# Patient Record
Sex: FEMALE | Race: BLACK OR AFRICAN AMERICAN | Employment: FULL TIME | URBAN - METROPOLITAN AREA
[De-identification: names, ages, dates, MRNs, and addresses within clinical notes are randomized per-mention and may not be internally consistent; named-entity substitution may affect disease eponyms.]

---

## 2020-06-01 ENCOUNTER — OFFICE VISIT (OUTPATIENT)
Dept: CARDIOLOGY CLINIC | Age: 60
End: 2020-06-01

## 2020-06-01 VITALS
WEIGHT: 185 LBS | OXYGEN SATURATION: 97 % | HEART RATE: 76 BPM | BODY MASS INDEX: 29.73 KG/M2 | HEIGHT: 66 IN | SYSTOLIC BLOOD PRESSURE: 170 MMHG | DIASTOLIC BLOOD PRESSURE: 112 MMHG

## 2020-06-01 DIAGNOSIS — R94.31 ABNORMAL EKG: ICD-10-CM

## 2020-06-01 DIAGNOSIS — R06.02 SOB (SHORTNESS OF BREATH): ICD-10-CM

## 2020-06-01 DIAGNOSIS — I50.9 CONGESTIVE HEART FAILURE, UNSPECIFIED HF CHRONICITY, UNSPECIFIED HEART FAILURE TYPE (HCC): Primary | ICD-10-CM

## 2020-06-01 DIAGNOSIS — I10 ESSENTIAL HYPERTENSION: ICD-10-CM

## 2020-06-01 DIAGNOSIS — E78.5 DYSLIPIDEMIA: ICD-10-CM

## 2020-06-01 RX ORDER — AMLODIPINE BESYLATE 10 MG/1
10 TABLET ORAL DAILY
Qty: 30 TAB | Refills: 5 | Status: SHIPPED | OUTPATIENT
Start: 2020-06-01 | End: 2021-08-12

## 2020-06-01 RX ORDER — CARVEDILOL 12.5 MG/1
12.5 TABLET ORAL 2 TIMES DAILY WITH MEALS
COMMUNITY
End: 2020-08-24

## 2020-06-01 RX ORDER — ASCORBIC ACID 500 MG
500 TABLET ORAL
COMMUNITY

## 2020-06-01 RX ORDER — ASPIRIN 325 MG
325 TABLET ORAL DAILY
COMMUNITY
End: 2022-01-25

## 2020-06-01 RX ORDER — MONTELUKAST SODIUM 10 MG/1
10 TABLET ORAL DAILY
COMMUNITY
End: 2021-08-13 | Stop reason: SDUPTHER

## 2020-06-01 RX ORDER — LANOLIN ALCOHOL/MO/W.PET/CERES
325 CREAM (GRAM) TOPICAL
COMMUNITY
End: 2020-11-30

## 2020-06-01 RX ORDER — LISINOPRIL 20 MG/1
20 TABLET ORAL DAILY
COMMUNITY
End: 2020-09-03

## 2020-06-01 RX ORDER — POTASSIUM CHLORIDE 20 MEQ/1
20 TABLET, EXTENDED RELEASE ORAL DAILY
COMMUNITY
End: 2022-01-19 | Stop reason: SDUPTHER

## 2020-06-01 RX ORDER — ERGOCALCIFEROL 1.25 MG/1
50000 CAPSULE ORAL
Status: ON HOLD | COMMUNITY
End: 2021-10-06

## 2020-06-01 RX ORDER — LANOLIN ALCOHOL/MO/W.PET/CERES
1000 CREAM (GRAM) TOPICAL
COMMUNITY

## 2020-06-01 RX ORDER — TRIAMCINOLONE ACETONIDE 55 UG/1
2 SPRAY, METERED NASAL DAILY
COMMUNITY
End: 2022-01-25

## 2020-06-01 RX ORDER — FUROSEMIDE 40 MG/1
40 TABLET ORAL DAILY
COMMUNITY
End: 2022-01-19 | Stop reason: SDUPTHER

## 2020-06-01 NOTE — PROGRESS NOTES
Spero Phlegm presents today for   Chief Complaint   Patient presents with    New Patient     referred by PCP for CHF    Shortness of Breath     exertion, 3 weeks       Spero Phlegm preferred language for health care discussion is english/other. Is someone accompanying this pt? no    Is the patient using any DME equipment during [address]? no    Depression Screening:  3 most recent PHQ Screens 6/1/2020   Little interest or pleasure in doing things Not at all   Feeling down, depressed, irritable, or hopeless Not at all   Total Score PHQ 2 0       Learning Assessment:  Learning Assessment 6/1/2020   PRIMARY LEARNER Patient   PRIMARY LANGUAGE ENGLISH   LEARNER PREFERENCE PRIMARY DEMONSTRATION   ANSWERED BY Patient   RELATIONSHIP SELF       Abuse Screening:  Abuse Screening Questionnaire 6/1/2020   Do you ever feel afraid of your partner? N   Are you in a relationship with someone who physically or mentally threatens you? N   Is it safe for you to go home? Y       Fall Risk  Fall Risk Assessment, last 12 mths 6/1/2020   Able to walk? Yes   Fall in past 12 months? No       Pt currently taking Anticoagulant therapy? ASA 325mg every day     Coordination of Care:  1. Have you been to the ER, urgent care clinic since your last visit? Hospitalized since your last visit? no    2. Have you seen or consulted any other health care providers outside of the [address] [place] [address] since your last visit? Include any pap smears or colon screening.  no

## 2020-06-01 NOTE — PROGRESS NOTES
HISTORY OF PRESENT ILLNESS  Joselin Beard is a 60 y.o. female. HPI    Patient presents for a new office visit. She was referred here by her PCP for evaluation of congestive heart failure. Patient has a longstanding history of essential hypertension for greater than 10 years. Patient states she also has a history of congestive heart failure which required a cardiac work-up by Dr. Araseli Bobby at [facility] approximately 10 years ago which included an echocardiogram and what sounds like a cardiac catheterization. Patient was told that her heart function was normal but she may have had some valvular heart disease. She had no significant blockages on her heart catheterization. Patient reports that she had to switch  her PCPs due to insurance reasons and as result was off all of her medications for almost 2 months. She states that approximately 3 to 4 weeks ago she developed worsening shortness of breath which was more noticeable with exertion when she was laying down at night. She was also having some wheezing. She establish care with a new PCP, who diagnosed her with acute congestive heart failure based on an elevated BNP level and abnormal chest x-ray. She was started back on some blood pressure medications along with furosemide which she has been taking now for 2 weeks. She states since starting these medications, she is been feeling much better. He denies any leg swelling. No significant weight gain over the past few months. She denies any exertional chest pain. No claudication, no heart palpitations, dizzy spells or syncope. Past Medical History:   Diagnosis Date    Congestive heart failure (HCC)     Essential hypertension     Goiter      Current Outpatient Medications   Medication Sig Dispense Refill    aspirin (ASPIRIN) 325 mg tablet Take 325 mg by mouth daily.  ascorbic acid, vitamin C, (Vitamin C) 500 mg tablet Take  by mouth.       furosemide (Lasix) 40 mg tablet Take 40 mg by mouth daily.  montelukast (Singulair) 10 mg tablet Take 10 mg by mouth daily.  ergocalciferol (Vitamin D2) 1,250 mcg (50,000 unit) capsule Take 50,000 Units by mouth.  cyanocobalamin 1,000 mcg tablet Take 1,000 mcg by mouth daily.  ferrous sulfate (Iron) 325 mg (65 mg iron) tablet Take 325 mg by mouth Daily (before breakfast).  potassium chloride (K-DUR, KLOR-CON) 20 mEq tablet Take 20 mEq by mouth daily.  lisinopriL (PRINIVIL, ZESTRIL) 20 mg tablet Take 20 mg by mouth daily.  carvediloL (Coreg) 12.5 mg tablet Take 12.5 mg by mouth two (2) times daily (with meals).  triamcinolone (NASACORT AQ) 55 mcg nasal inhaler 2 Sprays by Both Nostrils route daily.  amLODIPine (NORVASC) 10 mg tablet Take 1 Tab by mouth daily. 30 Tab 5     Allergies   Allergen Reactions    Nitroglycerin Other (comments)     Headaches      Social History     Tobacco Use    Smoking status: Never Smoker    Smokeless tobacco: Never Used   Substance Use Topics    Alcohol use: Not Currently    Drug use: Not on file     Family History   Problem Relation Age of Onset    Cancer Mother         breast    Hypertension Mother     Heart Surgery Mother          Review of Systems   Constitutional: Negative for chills, fever and weight loss. HENT: Negative for nosebleeds. Eyes: Negative for blurred vision and double vision. Respiratory: Positive for shortness of breath. Negative for cough and wheezing. Cardiovascular: Positive for orthopnea. Negative for chest pain, palpitations, claudication, leg swelling and PND. Gastrointestinal: Negative for abdominal pain, heartburn, nausea and vomiting. Genitourinary: Negative for dysuria and hematuria. Musculoskeletal: Negative for falls and myalgias. Skin: Negative for rash. Neurological: Negative for dizziness, focal weakness and headaches. Endo/Heme/Allergies: Does not bruise/bleed easily.    Psychiatric/Behavioral: Negative for substance abuse.     Visit Vitals  BP (!) 170/112 (BP 1 Location: Left arm, BP Patient Position: Sitting)   Pulse 76   Ht 5' 6\" (1.676 m)   Wt 83.9 kg (185 lb)   SpO2 97%   BMI 29.86 kg/m²       Physical Exam   Constitutional: She is oriented to person, place, and time. She appears well-developed and well-nourished. HENT:   Head: Normocephalic and atraumatic. Eyes: Conjunctivae are normal.   Neck: Neck supple. No JVD present. Carotid bruit is not present. Cardiovascular: Normal rate, regular rhythm, S1 normal, S2 normal and normal pulses. Exam reveals no gallop. No murmur heard. Pulmonary/Chest: Effort normal and breath sounds normal. She has no wheezes. She has no rales. Abdominal: Soft. Bowel sounds are normal. There is no abdominal tenderness. Musculoskeletal:         General: No tenderness, deformity or edema. Neurological: She is alert and oriented to person, place, and time. Skin: Skin is warm and dry. Psychiatric: She has a normal mood and affect. Her behavior is normal. Thought content normal.     EKG: Normal sinus rhythm, probable left atrial enlargement, normal axis, normal QTc interval, poor R wave progression, diffuse nonspecific T wave abnormalities likely due to repolarization abnormality. Compared to the previous EKG, PVCs are no longer present, otherwise unchanged. ASSESSMENT and PLAN    Acute decompensated heart failure. Presumed heart failure with preserved ejection fraction based on prior history, and I suspect this may be due to poorly controlled blood pressure. This has significantly improved over the past 2 weeks since starting back on blood pressure medications along with her furosemide. She had not been taking medicines for at least 2 months. I have recommended an echocardiogram.  Of also advised a less than 2 g sodium diet and CHF education was given. Lastly, would like to add amlodipine to her regimen for better blood pressure control.   She states she is scheduled follow-up labs with her PCP this week. Essential hypertension. Patient reports that she recently restarted her medications after not having taken any blood pressure meds for over 2 months. She did not take her medications today, but I suspect she will need additional blood pressure lowering. She states that at home her blood pressure readings are still averaging 150/100. I recommended adding amlodipine 10 mg to her regimen and continuing to take her current dosage of carvedilol, lisinopril, and furosemide. It should be noted that she was also taking chlorthalidone as well. At this point I would not restart this medication since she is taking furosemide. Dyslipidemia. Patient was previously taking a statin, though that was recently stopped by PCP due to elevated LFTs. Abnormal EKG. Patient's EKG changes are likely due to left ventricular approach with repolarization. An echocardiogram will be ordered as described above.     Follow-up in 2 months, sooner if needed

## 2020-06-01 NOTE — PATIENT INSTRUCTIONS
START LOW SODIUM DIET  START AMLODIPINE 10MG ONCE DAILY  ECHO FOR CHF   FOLLOW UP 2 MONTHS    If you have not heard from the central scheduler to schedule your testing in 48 hours, please call [phone]. Low Sodium Diet (2,000 Milligram): Care Instructions  Your Care Instructions     Too much sodium causes your body to hold on to extra water. This can raise your blood pressure and force your heart and kidneys to work harder. In very serious cases, this could cause you to be put in the hospital. It might even be life-threatening. By limiting sodium, you will feel better and lower your risk of serious problems. The most common source of sodium is salt. People get most of the salt in their diet from canned, prepared, and packaged foods. Fast food and restaurant meals also are very high in sodium. Your doctor will probably limit your sodium to less than 2,000 milligrams (mg) a day. This limit counts all the sodium in prepared and packaged foods and any salt you add to your food. Follow-up care is a key part of your treatment and safety. Be sure to make and go to all appointments, and call your doctor if you are having problems. It's also a good idea to know your test results and keep a list of the medicines you take. How can you care for yourself at home? Read food labels  · Read labels on cans and food packages. The labels tell you how much sodium is in each serving. Make sure that you look at the serving size. If you eat more than the serving size, you have eaten more sodium. · Food labels also tell you the Percent Daily Value for sodium. Choose products with low Percent Daily Values for sodium. · Be aware that sodium can come in forms other than salt, including monosodium glutamate (MSG), sodium citrate, and sodium bicarbonate (baking soda). MSG is often added to Asian food. When you eat out, you can sometimes ask for food without MSG or added salt.   Buy low-sodium foods  · Buy foods that are labeled \"unsalted\" (no salt added), \"sodium-free\" (less than 5 mg of sodium per serving), or \"low-sodium\" (less than 140 mg of sodium per serving). Foods labeled \"reduced-sodium\" and \"light sodium\" may still have too much sodium. Be sure to read the label to see how much sodium you are getting. · Buy fresh vegetables, or frozen vegetables without added sauces. Buy low-sodium versions of canned vegetables, soups, and other canned goods. Prepare low-sodium meals  · Cut back on the amount of salt you use in cooking. This will help you adjust to the taste. Do not add salt after cooking. One teaspoon of salt has about 2,300 mg of sodium. · Take the salt shaker off the table. · Flavor your food with garlic, lemon juice, onion, vinegar, herbs, and spices. Do not use soy sauce, lite soy sauce, steak sauce, onion salt, garlic salt, celery salt, mustard, or ketchup on your food. · Use low-sodium salad dressings, sauces, and ketchup. Or make your own salad dressings and sauces without adding salt. · Use less salt (or none) when recipes call for it. You can often use half the salt a recipe calls for without losing flavor. Other foods such as rice, pasta, and grains do not need added salt. · Rinse canned vegetables, and cook them in fresh water. This removes some--but not all--of the salt. · Avoid water that is naturally high in sodium or that has been treated with water softeners, which add sodium. Call your local water company to find out the sodium content of your water supply. If you buy bottled water, read the label and choose a sodium-free brand. Avoid high-sodium foods  · Avoid eating:  ? Smoked, cured, salted, and canned meat, fish, and poultry. ? Ham, keys, hot dogs, and luncheon meats. ? Regular, hard, and processed cheese and regular peanut butter. ? Crackers with salted tops, and other salted snack foods such as pretzels, chips, and salted popcorn. ? Frozen prepared meals, unless labeled low-sodium. ?  Canned and dried soups, broths, and bouillon, unless labeled sodium-free or low-sodium. ? Canned vegetables, unless labeled sodium-free or low-sodium. ? Western Jennifer fries, pizza, tacos, and other fast foods. ? Pickles, olives, ketchup, and other condiments, especially soy sauce, unless labeled sodium-free or low-sodium. Where can you learn more? Go to http://rd-douglas.info/  Enter V843 in the search box to learn more about \"Low Sodium Diet (2,000 Milligram): Care Instructions. \"  Current as of: August 22, 2019               Content Version: 12.5  © 2071-6191 [employer]. Care instructions adapted under license by [employer] (which disclaims liability or warranty for this information). If you have questions about a medical condition or this instruction, always ask your healthcare professional. [address] any warranty or liability for your use of this information.

## 2020-07-09 ENCOUNTER — HOSPITAL ENCOUNTER (OUTPATIENT)
Dept: NON INVASIVE DIAGNOSTICS | Age: 60
Discharge: HOME | End: 2020-07-09
Attending: INTERNAL MEDICINE
Payer: COMMERCIAL

## 2020-07-09 VITALS
WEIGHT: 185 LBS | DIASTOLIC BLOOD PRESSURE: 112 MMHG | BODY MASS INDEX: 29.73 KG/M2 | SYSTOLIC BLOOD PRESSURE: 170 MMHG | HEIGHT: 66 IN

## 2020-07-09 DIAGNOSIS — I50.9 CONGESTIVE HEART FAILURE, UNSPECIFIED HF CHRONICITY, UNSPECIFIED HEART FAILURE TYPE (HCC): ICD-10-CM

## 2020-07-09 PROCEDURE — 93306 TTE W/DOPPLER COMPLETE: CPT

## 2020-07-10 LAB
ECHO AO ROOT DIAM: 3.36 CM
ECHO LA AREA 4C: 29.22 CM2
ECHO LA VOL 2C: 91.54 ML (ref 22–52)
ECHO LA VOL 4C: 98.04 ML (ref 22–52)
ECHO LA VOL BP: 112.09 ML (ref 22–52)
ECHO LA VOL/BSA BIPLANE: 57.93 ML/M2 (ref 16–28)
ECHO LA VOLUME INDEX A2C: 47.31 ML/M2 (ref 16–28)
ECHO LA VOLUME INDEX A4C: 50.67 ML/M2 (ref 16–28)
ECHO LV E' LATERAL VELOCITY: 5.4 CM/S
ECHO LV E' SEPTAL VELOCITY: 4.01 CM/S
ECHO LV EDV A2C: 110.15 ML
ECHO LV EDV A4C: 142.25 ML
ECHO LV EDV BP: 126.34 ML (ref 56–104)
ECHO LV EDV INDEX A4C: 73.5 ML/M2
ECHO LV EDV INDEX BP: 65.3 ML/M2
ECHO LV EDV NDEX A2C: 56.9 ML/M2
ECHO LV EJECTION FRACTION A2C: 43 PERCENT
ECHO LV EJECTION FRACTION A4C: 33 PERCENT
ECHO LV EJECTION FRACTION BIPLANE: 38.9 PERCENT (ref 55–100)
ECHO LV ESV A2C: 62.75 ML
ECHO LV ESV A4C: 94.88 ML
ECHO LV ESV BP: 77.26 ML (ref 19–49)
ECHO LV ESV INDEX A2C: 32.4 ML/M2
ECHO LV ESV INDEX A4C: 49 ML/M2
ECHO LV ESV INDEX BP: 39.9 ML/M2
ECHO LV INTERNAL DIMENSION DIASTOLIC: 5.63 CM (ref 3.9–5.3)
ECHO LV INTERNAL DIMENSION SYSTOLIC: 5.33 CM
ECHO LV IVSD: 1.12 CM (ref 0.6–0.9)
ECHO LV MASS 2D: 267 G (ref 67–162)
ECHO LV MASS INDEX 2D: 138 G/M2 (ref 43–95)
ECHO LV POSTERIOR WALL DIASTOLIC: 1.18 CM (ref 0.6–0.9)
ECHO LVOT CARDIAC OUTPUT: 5.03 LITER/MINUTE
ECHO LVOT DIAM: 2.16 CM
ECHO LVOT PEAK GRADIENT: 3.95 MMHG
ECHO LVOT PEAK VELOCITY: 99.34 CM/S
ECHO LVOT SV: 69.4 ML
ECHO LVOT VTI: 18.95 CM
ECHO MV A VELOCITY: 71.63 CM/S
ECHO MV E DECELERATION TIME (DT): 0.17 S
ECHO MV E VELOCITY: 87.92 CM/S
ECHO MV E/A RATIO: 1.23
ECHO MV E/E' LATERAL: 16.28
ECHO MV E/E' RATIO (AVERAGED): 19.1
ECHO MV E/E' SEPTAL: 21.93
ECHO MV EROA PISA: 0.05 CM2
ECHO MV REGURGITANT RADIUS PISA: 0.4 CM
ECHO MV REGURGITANT VOLUME: 9.69 ML
ECHO MV REGURGITANT VTIA: 190.66 CM
ECHO PV REGURGITANT MAX VELOCITY: 560.7 CM/S
ECHO RV TAPSE: 2.47 CM (ref 1.5–2)
ECHO TV REGURGITANT MAX VELOCITY: 273.66 CM/S
ECHO TV REGURGITANT PEAK GRADIENT: 29.96 MMHG
LVOT MG: 2.07 MMHG

## 2020-07-13 NOTE — PROGRESS NOTES
Per your last note \"      Acute decompensated heart failure. Presumed heart failure with preserved ejection fraction based on prior history, and I suspect this may be due to poorly controlled blood pressure. This has significantly improved over the past 2 weeks since starting back on blood pressure medications along with her furosemide. She had not been taking medicines for at least 2 months. I have recommended an echocardiogram.  Of also advised a less than 2 g sodium diet and CHF education was given. Lastly, would like to add amlodipine to her regimen for better blood pressure control. She states she is scheduled follow-up labs with her PCP this week.

## 2020-08-24 ENCOUNTER — OFFICE VISIT (OUTPATIENT)
Dept: CARDIOLOGY CLINIC | Age: 60
End: 2020-08-24

## 2020-08-24 VITALS
OXYGEN SATURATION: 97 % | BODY MASS INDEX: 30.22 KG/M2 | HEART RATE: 77 BPM | DIASTOLIC BLOOD PRESSURE: 88 MMHG | WEIGHT: 188 LBS | HEIGHT: 66 IN | SYSTOLIC BLOOD PRESSURE: 140 MMHG

## 2020-08-24 DIAGNOSIS — I50.9 CONGESTIVE HEART FAILURE, UNSPECIFIED HF CHRONICITY, UNSPECIFIED HEART FAILURE TYPE (HCC): Primary | ICD-10-CM

## 2020-08-24 DIAGNOSIS — E78.5 DYSLIPIDEMIA: ICD-10-CM

## 2020-08-24 DIAGNOSIS — I10 ESSENTIAL HYPERTENSION: ICD-10-CM

## 2020-08-24 DIAGNOSIS — I42.9 CARDIOMYOPATHY, UNSPECIFIED TYPE (HCC): ICD-10-CM

## 2020-08-24 RX ORDER — CARVEDILOL 25 MG/1
25 TABLET ORAL 2 TIMES DAILY WITH MEALS
Qty: 60 TAB | Refills: 5 | Status: SHIPPED | OUTPATIENT
Start: 2020-08-24 | End: 2022-01-19 | Stop reason: SDUPTHER

## 2020-08-24 NOTE — PATIENT INSTRUCTIONS
Increase coreg to 25mg twice daily  Limited echo for chf  Follow up 3 months     If you have not heard from the central scheduler to schedule your testing in 48 hours, please call [phone].

## 2020-08-24 NOTE — PROGRESS NOTES
HISTORY OF PRESENT ILLNESS  Wendi Garcia is a 60 y.o. female. Follow-up   Pertinent negatives include no chest pain, no abdominal pain, no headaches and no shortness of breath. Patient presents for a follow-up office visit. She was initially referred here by her PCP for evaluation of congestive heart failure. Patient has a longstanding history of essential hypertension for greater than 10 years. Patient states she also has a history of congestive heart failure which required a cardiac work-up by Dr. Gagandeep Santana at [facility] approximately 10 years ago which included an echocardiogram and what sounds like a cardiac catheterization. Patient was told that her heart function was normal but she may have had some valvular heart disease. She had no significant blockages on her heart catheterization. She recently establish care with a new PCP, who diagnosed her with acute congestive heart failure based on an elevated BNP level and abnormal chest x-ray. She was started back on some blood pressure medications along with furosemide. Patient was last seen in our office a little less than 3 months ago. She underwent an echocardiogram in July 2020 which showed a mildly depressed LV function, EF 35 to 40% with global hypokinesis, grade 2 diastolic dysfunction, moderate mitral regurgitation, right atrial and right ventricular enlargement, severe left atrial argument with upper normal PA pressures. Since last visit, she states she has been taking all of her medications as prescribed including her furosemide is been feeling much better. She states her shortness of breath has significantly improved. She denies any orthopnea, PND or leg swelling. No exertional chest pain or pressure.     Past Medical History:   Diagnosis Date    Congestive heart failure (HCC)     Essential hypertension     Goiter      Current Outpatient Medications   Medication Sig Dispense Refill    carvediloL (COREG) 25 mg tablet Take 1 Tab by mouth two (2) times daily (with meals). 60 Tab 5    aspirin (ASPIRIN) 325 mg tablet Take 325 mg by mouth daily.  ascorbic acid, vitamin C, (Vitamin C) 500 mg tablet Take  by mouth.  furosemide (Lasix) 40 mg tablet Take 40 mg by mouth daily.  montelukast (Singulair) 10 mg tablet Take 10 mg by mouth daily.  ergocalciferol (Vitamin D2) 1,250 mcg (50,000 unit) capsule Take 50,000 Units by mouth.  cyanocobalamin 1,000 mcg tablet Take 1,000 mcg by mouth daily.  ferrous sulfate (Iron) 325 mg (65 mg iron) tablet Take 325 mg by mouth Daily (before breakfast).  potassium chloride (K-DUR, KLOR-CON) 20 mEq tablet Take 20 mEq by mouth daily.  lisinopriL (PRINIVIL, ZESTRIL) 20 mg tablet Take 20 mg by mouth daily.  triamcinolone (NASACORT AQ) 55 mcg nasal inhaler 2 Sprays by Both Nostrils route daily.  amLODIPine (NORVASC) 10 mg tablet Take 1 Tab by mouth daily. 30 Tab 5     Allergies   Allergen Reactions    Nitroglycerin Other (comments)     Headaches      Social History     Tobacco Use    Smoking status: Never Smoker    Smokeless tobacco: Never Used   Substance Use Topics    Alcohol use: Not Currently    Drug use: Not on file     Family History   Problem Relation Age of Onset    Cancer Mother         breast    Hypertension Mother     Heart Surgery Mother          Review of Systems   Constitutional: Negative for chills, fever and weight loss. HENT: Negative for nosebleeds. Eyes: Negative for blurred vision and double vision. Respiratory: Negative for cough, shortness of breath and wheezing. Cardiovascular: Negative for chest pain, palpitations, orthopnea, claudication, leg swelling and PND. Gastrointestinal: Negative for abdominal pain, heartburn, nausea and vomiting. Genitourinary: Negative for dysuria and hematuria. Musculoskeletal: Negative for falls and myalgias. Skin: Negative for rash.    Neurological: Negative for dizziness, focal weakness and headaches. Endo/Heme/Allergies: Does not bruise/bleed easily. Psychiatric/Behavioral: Negative for substance abuse. Visit Vitals  /88 (BP 1 Location: Right arm, BP Patient Position: Sitting)   Pulse 77   Ht 5' 6\" (1.676 m)   Wt 85.3 kg (188 lb)   SpO2 97%   BMI 30.34 kg/m²       Physical Exam   Constitutional: She is oriented to person, place, and time. She appears well-developed and well-nourished. HENT:   Head: Normocephalic and atraumatic. Eyes: Conjunctivae are normal.   Neck: Neck supple. No JVD present. Carotid bruit is not present. Cardiovascular: Normal rate, regular rhythm, S1 normal, S2 normal and normal pulses. Exam reveals no gallop. No murmur heard. Pulmonary/Chest: Effort normal and breath sounds normal. She has no wheezes. She has no rales. Abdominal: Soft. Bowel sounds are normal. There is no abdominal tenderness. Musculoskeletal:         General: No tenderness, deformity or edema. Neurological: She is alert and oriented to person, place, and time. Skin: Skin is warm and dry. Psychiatric: She has a normal mood and affect. Her behavior is normal. Thought content normal.     ASSESSMENT and PLAN    Chronic systolic heart failure. Echocardiogram in July 2020 showed a mildly depressed LV function, EF 35-40 % with global hypokinesis and grade 2 diastolic dysfunction. At last visit she was decompensated, she now appears to be well compensated on her current medical regimen along with her furosemide. I have recommended increasing her carvedilol to 25 mg twice daily. She should also adhere to a low-sodium diet. I would like to repeat a limited echocardiogram in several months to reevaluate her LV function. Cardiomyopathy. Presumably hypertensive in nature. Hopefully her ejection fraction will improve with optimal medical therapy. A repeat echocardiogram was ordered as described above. Essential hypertension.   This is now much better controlled than it had been noted she is taking her medications regularly. I would like to increase her carvedilol to 25 mg twice daily. Dyslipidemia. Patient was previously taking a statin, though that was recently stopped by PCP due to elevated LFTs.     Follow-up in 3 months, sooner if needed

## 2020-08-24 NOTE — PROGRESS NOTES
Claribel Weiss presents today for   Chief Complaint   Patient presents with    Follow-up     2 month follow up       Claribel Weiss preferred language for health care discussion is english/other. Is someone accompanying this pt? no    Is the patient using any DME equipment during [address]? no    Depression Screening:  3 most recent PHQ Screens 8/24/2020   Little interest or pleasure in doing things Not at all   Feeling down, depressed, irritable, or hopeless Not at all   Total Score PHQ 2 0       Learning Assessment:  Learning Assessment 6/1/2020   PRIMARY LEARNER Patient   PRIMARY LANGUAGE ENGLISH   LEARNER PREFERENCE PRIMARY DEMONSTRATION   ANSWERED BY Patient   RELATIONSHIP SELF       Abuse Screening:  Abuse Screening Questionnaire 8/24/2020   Do you ever feel afraid of your partner? N   Are you in a relationship with someone who physically or mentally threatens you? N   Is it safe for you to go home? Y       Fall Risk  Fall Risk Assessment, last 12 mths 8/24/2020   Able to walk? Yes   Fall in past 12 months? No       Pt currently taking Anticoagulant therapy? No but is taking  mg once a day    Coordination of Care:  1. Have you been to the ER, urgent care clinic since your last visit? Hospitalized since your last visit? no    2. Have you seen or consulted any other health care providers outside of the [address] [place] [address] since your last visit? Include any pap smears or colon screening.  no

## 2020-09-01 ENCOUNTER — VIRTUAL VISIT (OUTPATIENT)
Dept: FAMILY MEDICINE CLINIC | Age: 60
End: 2020-09-01
Payer: COMMERCIAL

## 2020-09-01 DIAGNOSIS — R52 PAIN: ICD-10-CM

## 2020-09-01 DIAGNOSIS — R11.0 NAUSEA: ICD-10-CM

## 2020-09-01 DIAGNOSIS — N20.0 RENAL STONE: Primary | ICD-10-CM

## 2020-09-01 DIAGNOSIS — N20.0 STAGHORN CALCULUS: ICD-10-CM

## 2020-09-01 PROCEDURE — 99214 OFFICE O/P EST MOD 30 MIN: CPT | Performed by: NURSE PRACTITIONER

## 2020-09-01 RX ORDER — KETOROLAC TROMETHAMINE 10 MG/1
10 TABLET, FILM COATED ORAL
Qty: 20 TAB | Refills: 0 | Status: SHIPPED | OUTPATIENT
Start: 2020-09-01 | End: 2021-10-08

## 2020-09-01 RX ORDER — ONDANSETRON 4 MG/1
4 TABLET, ORALLY DISINTEGRATING ORAL
COMMUNITY
End: 2020-09-01

## 2020-09-01 RX ORDER — OXYCODONE AND ACETAMINOPHEN 5; 325 MG/1; MG/1
1 TABLET ORAL
COMMUNITY
End: 2020-09-01

## 2020-09-01 RX ORDER — ONDANSETRON 8 MG/1
8 TABLET, ORALLY DISINTEGRATING ORAL
Qty: 20 TAB | Refills: 0 | Status: SHIPPED | OUTPATIENT
Start: 2020-09-01 | End: 2022-07-28 | Stop reason: ALTCHOICE

## 2020-09-01 NOTE — PROGRESS NOTES
Wil Alvarez presents today for   Chief Complaint   Patient presents with   [facility] Follow Up     ER follow up. Still having pain in the groin area. Depression Screening:  3 most recent PHQ Screens 9/1/2020   Little interest or pleasure in doing things Not at all   Feeling down, depressed, irritable, or hopeless Not at all   Total Score PHQ 2 0       Learning Assessment:  Learning Assessment 9/1/2020   PRIMARY LEARNER Patient   HIGHEST LEVEL OF EDUCATION - PRIMARY LEARNER  SOME COLLEGE   BARRIERS PRIMARY LEARNER NONE   CO-LEARNER CAREGIVER No   PRIMARY LANGUAGE ENGLISH   LEARNER PREFERENCE PRIMARY LISTENING   ANSWERED BY patient   RELATIONSHIP SELF       Abuse Screening:  Abuse Screening Questionnaire 8/24/2020   Do you ever feel afraid of your partner? N   Are you in a relationship with someone who physically or mentally threatens you? N   Is it safe for you to go home? Y       Fall Risk  Fall Risk Assessment, last 12 mths 8/24/2020   Able to walk? Yes   Fall in past 12 months? No       Health Maintenance reviewed and discussed and ordered per Provider. Health Maintenance Due   Topic Date Due    Hepatitis C Screening  1960    DTaP/Tdap/Td series (1 - Tdap) 10/10/1980    PAP AKA CERVICAL CYTOLOGY  10/10/1980    Lipid Screen  10/10/1999    Shingrix Vaccine Age 50> (1 of 2) 10/10/2009    Breast Cancer Screen Mammogram  10/10/2009    FOBT Q1Y Age 50-75  10/10/2009    Flu Vaccine (1) 09/01/2020   . Coordination of Care:  1. Have you been to the ER, urgent care clinic since your last visit? Hospitalized since your last visit? Yes, for abdominal pain. Patient is having ER follow up apt today. 2. Have you seen or consulted any other health care providers outside of the [address] [place] [address] since your last visit? Include any pap smears or colon screening.  No

## 2020-09-01 NOTE — PATIENT INSTRUCTIONS
Kidney Stone: Care Instructions Your Care Instructions Kidney stones are formed when salts, minerals, and other substances normally found in the urine clump together. They can be as small as grains of sand or, rarely, as large as golf balls. While the stone is traveling through the ureter, which is the tube that carries urine from the kidney to the bladder, you will probably feel pain. The pain may be mild or very severe. You may also have some blood in your urine. As soon as the stone reaches the bladder, any intense pain should go away. If a stone is too large to pass on its own, you may need a medical procedure to help you pass the stone. The doctor has checked you carefully, but problems can develop later. If you notice any problems or new symptoms, get medical treatment right away. Follow-up care is a key part of your treatment and safety. Be sure to make and go to all appointments, and call your doctor if you are having problems. It's also a good idea to know your test results and keep a list of the medicines you take. How can you care for yourself at home? · Drink plenty of fluids, enough so that your urine is light yellow or clear like water. If you have kidney, heart, or liver disease and have to limit fluids, talk with your doctor before you increase the amount of fluids you drink. · Take pain medicines exactly as directed. Call your doctor if you think you are having a problem with your medicine. ? If the doctor gave you a prescription medicine for pain, take it as prescribed. ? If you are not taking a prescription pain medicine, ask your doctor if you can take an over-the-counter medicine. Read and follow all instructions on the label. · Your doctor may ask you to strain your urine so that you can collect your kidney stone when it passes. You can use a kitchen strainer or a tea strainer to catch the stone. Store it in a plastic bag until you see your doctor again. Preventing future kidney stones Some changes in your diet may help prevent kidney stones. Depending on the cause of your stones, your doctor may recommend that you: · Drink plenty of fluids, enough so that your urine is light yellow or clear like water. If you have kidney, heart, or liver disease and have to limit fluids, talk with your doctor before you increase the amount of fluids you drink. · Limit coffee, tea, and alcohol. Also avoid grapefruit juice. · Do not take more than the recommended daily dose of vitamins C and D. 
· Avoid antacids such as Gaviscon, Maalox, Mylanta, or Tums. · Limit the amount of salt (sodium) in your diet. · Eat a balanced diet that is not too high in protein. · Limit foods that are high in a substance called oxalate, which can cause kidney stones. These foods include dark green vegetables, rhubarb, chocolate, wheat bran, nuts, cranberries, and beans. When should you call for help? Call your doctor now or seek immediate medical care if: 
· You cannot keep down fluids. · Your pain gets worse. · You have a fever or chills. · You have new or worse pain in your back just below your rib cage (the flank area). · You have new or more blood in your urine. Watch closely for changes in your health, and be sure to contact your doctor if: 
· You do not get better as expected. Where can you learn more? Go to http://rd-douglas.info/ Enter Z897 in the search box to learn more about \"Kidney Stone: Care Instructions. \" Current as of: August 12, 2019               Content Version: 12.5 © 0020-7390 Healthwise, Incorporated. Care instructions adapted under license by [employer] (which disclaims liability or warranty for this information). If you have questions about a medical condition or this instruction, always ask your healthcare professional. [address] any warranty or liability for your use of this information. Learning About Diet for Kidney Stone Prevention What are kidney stones? Kidney stones are made of salts and minerals in the urine that form small \"anali. \" Stones can form in the kidneys and the ureters (the tubes that lead from the kidneys to the bladder). They can also form in the bladder. Stones may not cause a problem as long as they stay in the kidneys. But they can cause sudden, severe pain. Pain is most likely when the stones travel from the kidneys to the bladder. Kidney stones can cause bloody urine. Kidney stones often run in families. You are more likely to get them if you don't drink enough fluids, mainly water. Certain foods and drinks and some dietary supplements may also increase your risk for kidney stones if you consume too much of them. What can you do to prevent kidney stones? Changing what you eat may not prevent all types of kidney stones. But for people who have a history of certain kinds of kidney stones, some changes in diet may help. A dietitian can help you set up a meal plan that includes healthy, low-oxalate choices. Here are some general guidelines to get you started. Plan your meals and snacks around foods that are low in oxalate. These foods include: · Corn, kale, parsnips, and squash,. · Beef, chicken, pork, turkey, and fish. · Milk, butter, cheese, and yogurt. You can eat certain foods that are medium-high in oxalate, but eat them only once in a while. These foods include: · Bread. · Brown rice. · English muffins. · Figs. · Popcorn. · String beans. · Tomatoes. Limit very high-oxalate foods, including: · Black tea. · Coffee. · Chocolate. · Dark green vegetables. · Nuts. Here are some other things you can do to help prevent kidney stones. · Drink plenty of fluids. If you have kidney, heart, or liver disease and have to limit fluids, talk with your doctor before you increase the amount of fluids you drink. · Do not take more than the recommended daily dose of vitamins C and D. 
· Limit the salt in your diet. · Eat a balanced diet that is not too high in protein. Follow-up care is a key part of your treatment and safety. Be sure to make and go to all appointments, and call your doctor if you are having problems. It's also a good idea to know your test results and keep a list of the medicines you take. Where can you learn more? Go to http://rd-douglas.info/ Enter C138 in the search box to learn more about \"Learning About Diet for Kidney Stone Prevention. \" Current as of: August 22, 2019               Content Version: 12.5 © 8734-8197 [employer]. Care instructions adapted under license by [employer] (which disclaims liability or warranty for this information). If you have questions about a medical condition or this instruction, always ask your healthcare professional. [place] [address] any warranty or liability for your use of this information. Nausea and Vomiting: Care Instructions Your Care Instructions When you are nauseated, you may feel weak and sweaty and notice a lot of saliva in your mouth. Nausea often leads to vomiting. Most of the time you do not need to worry about nausea and vomiting, but they can be signs of other illnesses. Two common causes of nausea and vomiting are stomach flu and food poisoning. Nausea and vomiting from viral stomach flu will usually start to improve within 24 hours. Nausea and vomiting from food poisoning may last from 12 to 48 hours. The doctor has checked you carefully, but problems can develop later. If you notice any problems or new symptoms, get medical treatment right away. Follow-up care is a key part of your treatment and safety. Be sure to make and go to all appointments, and call your doctor if you are having problems.  It's also a good idea to know your test results and keep a list of the medicines you take. How can you care for yourself at home? · To prevent dehydration, drink plenty of fluids, enough so that your urine is light yellow or clear like water. Choose water and other caffeine-free clear liquids until you feel better. If you have kidney, heart, or liver disease and have to limit fluids, talk with your doctor before you increase the amount of fluids you drink. · Rest in bed until you feel better. · When you are able to eat, try clear soups, mild foods, and liquids until all symptoms are gone for 12 to 48 hours. Other good choices include dry toast, crackers, cooked cereal, and gelatin dessert, such as Jell-O. When should you call for help? [id number] anytime you think you may need emergency care. For example, call if: 
· You passed out (lost consciousness). Call your doctor now or seek immediate medical care if: 
· You have symptoms of dehydration, such as: 
? Dry eyes and a dry mouth. ? Passing only a little dark urine. ? Feeling thirstier than usual. 
· You have new or worsening belly pain. · You have a new or higher fever. · You vomit blood or what looks like coffee grounds. Watch closely for changes in your health, and be sure to contact your doctor if: 
· You have ongoing nausea and vomiting. · Your vomiting is getting worse. · Your vomiting lasts longer than 2 days. · You are not getting better as expected. Where can you learn more? Go to http://rd-douglas.info/ Enter 25 [id number] in the search box to learn more about \"Nausea and Vomiting: Care Instructions. \" Current as of: June 26, 2019               Content Version: 12.5 © 7788-8836 Healthwise, Incorporated. Care instructions adapted under license by [employer] (which disclaims liability or warranty for this information).  If you have questions about a medical condition or this instruction, always ask your healthcare professional. Brock Giron disclaims any warranty or liability for your use of this information.

## 2020-09-01 NOTE — PROGRESS NOTES
BROCK Hare is a 60 y.o. female and presents today for Hospital Follow Up (ER follow up. Still having pain in the groin area.)  . Patient presents via virtual video visit. Patient treated and [facility] urology for renal stone CT report showed moderate right-sided hydronephrosis and hydroureter with a 5 mm stone at the right ureterovesicular junction along with prominent staghorn type calculus spanning the mid and lower pole regions of the right kidney at approximately 2.5 cm in greatest dimension patient also had punctuate interpolar left renal stone. Today the patient complains of lower back pain intermittent nausea no vomiting and \"right groin pain \". She rates pain 7 out of 10. She states she is tried the [employer] however they have not helped with the pain. She is trying the PRN 4 mg Zofran provided from the ER however she states that that is \"only taken the edge off the nausea \". She states she continues to urinate without difficulty. She denies fever. Allergies    Allergies   Allergen Reactions    Nitroglycerin Other (comments)     Headaches        Medications    Current Outpatient Medications   Medication Sig Dispense    tamsulosin (Flomax) 0.4 mg capsule Take 1 Cap by mouth daily. 14 Cap    ketorolac (TORADOL) 10 mg tablet Take 1 Tab by mouth every six (6) hours as needed for Pain. 20 Tab    ondansetron (ZOFRAN ODT) 8 mg disintegrating tablet Take 1 Tab by mouth every eight (8) hours as needed for Nausea or Vomiting. 20 Tab    carvediloL (COREG) 25 mg tablet Take 1 Tab by mouth two (2) times daily (with meals). 60 Tab    aspirin (ASPIRIN) 325 mg tablet Take 325 mg by mouth daily.  ascorbic acid, vitamin C, (Vitamin C) 500 mg tablet Take  by mouth.  furosemide (Lasix) 40 mg tablet Take 40 mg by mouth daily.  montelukast (Singulair) 10 mg tablet Take 10 mg by mouth daily.  ergocalciferol (Vitamin D2) 1,250 mcg (50,000 unit) capsule Take 50,000 Units by mouth.      cyanocobalamin 1,000 mcg tablet Take 1,000 mcg by mouth daily.  ferrous sulfate (Iron) 325 mg (65 mg iron) tablet Take 325 mg by mouth Daily (before breakfast).  potassium chloride (K-DUR, KLOR-CON) 20 mEq tablet Take 20 mEq by mouth daily.  lisinopriL (PRINIVIL, ZESTRIL) 20 mg tablet Take 20 mg by mouth daily.  triamcinolone (NASACORT AQ) 55 mcg nasal inhaler 2 Sprays by Both Nostrils route daily.  amLODIPine (NORVASC) 10 mg tablet Take 1 Tab by mouth daily. 30 Tab     No current facility-administered medications for this visit. Health Maintenance    Health Maintenance Due   Topic Date Due    Hepatitis C Screening  1960    DTaP/Tdap/Td series (1 - Tdap) 10/10/1980    PAP AKA CERVICAL CYTOLOGY  10/10/1980    Lipid Screen  10/10/1999    Shingrix Vaccine Age 50> (1 of 2) 10/10/2009    Breast Cancer Screen Mammogram  10/10/2009    Flu Vaccine (1) 09/01/2020        Problem List    Patient Active Problem List    Diagnosis Date Noted    Renal stone 09/01/2020    Pain 09/01/2020    Staghorn calculus 09/01/2020    Cardiomyopathy ([facility] [address] 08/24/2020    Essential hypertension 06/01/2020    Congestive heart failure ([facility] [address] 06/01/2020    Dyslipidemia 06/01/2020        Family Hx    Family History   Problem Relation Age of Onset    Cancer Mother         breast    Hypertension Mother     Heart Surgery Mother         Social Hx    Social History     Socioeconomic History    Marital status: UNKNOWN     Spouse name: Not on file    Number of children: Not on file    Years of education: Not on file    Highest education level: Not on file   Tobacco Use    Smoking status: Never Smoker    Smokeless tobacco: Never Used   Substance and Sexual Activity    Alcohol use: Not Currently        Surgical Hx    Past Surgical History:   Procedure Laterality Date    HX HEART CATHETERIZATION            Vitals    There were no vitals taken for this visit.      ROS  Review of Systems   Constitutional: Positive for malaise/fatigue. Negative for chills, fever and weight loss. HENT: Negative for congestion, ear discharge, ear pain and sinus pain. Respiratory: Negative for cough and shortness of breath. Cardiovascular: Negative for chest pain, palpitations and leg swelling. Gastrointestinal: Positive for abdominal pain and nausea. Negative for vomiting. Genitourinary: Positive for flank pain. Negative for dysuria, frequency, hematuria and urgency. Skin: Negative for itching and rash. Physical Exam      Physical Exam  Constitutional:       General: She is not in acute distress. Appearance: She is not ill-appearing or diaphoretic. HENT:      Head: Normocephalic and atraumatic. Right Ear: External ear normal.      Left Ear: External ear normal.   Eyes:      General: No scleral icterus. Right eye: No discharge. Left eye: No discharge. Pulmonary:      Effort: Pulmonary effort is normal. No respiratory distress. Neurological:      Mental Status: She is alert and oriented to person, place, and time. Psychiatric:         Mood and Affect: Mood normal.         Behavior: Behavior normal.          Assessment/Plan    Diagnoses and all orders for this visit:    1. Renal stone  -     REFERRAL TO UROLOGY  -     tamsulosin (Flomax) 0.4 mg capsule; Take 1 Cap by mouth daily. -     ketorolac (TORADOL) 10 mg tablet; Take 1 Tab by mouth every six (6) hours as needed for Pain. 2. Pain  -     ketorolac (TORADOL) 10 mg tablet; Take 1 Tab by mouth every six (6) hours as needed for Pain. Ketorolac as needed for pain  3. Staghorn calculus  Upcoming appointment with urology  4. Nausea  -     ondansetron (ZOFRAN ODT) 8 mg disintegrating tablet; Take 1 Tab by mouth every eight (8) hours as needed for Nausea or Vomiting.   Brat diet as well as PRN Zofran for nausea    I spoke directly with [facility] radiologist to clarify whether patient had obstructive versus nonobstructive stone as the CT report did not directly list.  He stated that patient did in fact have obstruction given her right-sided hydronephrosis and hydroureter. Patient has urology appointment set by my office staff schedule for this Thursday, October 4, 2020. Advised patient to discontinue the Percocet as it is not effective she will start as needed ketorolac to take with food along with Zofran as needed. Advised patient if she develops fever excessive nausea vomiting, inability to urinate or pain to call 911. Patient verbalized understanding. Disclaimer: The patient understands our medical plan. Alternatives have been explained and offered. The risks, benefits and significant side effects of all medications have been reviewed. Anticipated time course and progression of condition reviewed. All questions have been addressed. She is encouraged to employ the information provided in the after visit summary, which was reviewed. Where appropriate, she is instructed to call the clinic if she has not been notified either by phone or through [address] with the results of her tests or with an appointment plan for any referrals within 1 week(s). No news is not good news; it's no news. The patient  is to call if her condition worsens or fails to improve or if significant side effects are experienced. Aspects of this note may have been generated using voice recognition software. Despite editing, there may be unrecognized errors. Consent:  The patient and/or their healthcare decision maker is aware that this patient-initiated Telehealth encounter, conducted by synchronous (real-time) audio-video technology, is a billable service, with coverage as determined by their insurance carrier. They are aware that they may receive a bill and has provided verbal consent to proceed: Yes    I was in the office while conducting this encounter while the patient was in their home.     Pursuant to the emergency declaration under the [facility] [address] waiver authority and the [employer] Act, this Virtual  Visit was conducted, with patient's consent, to reduce the patient's risk of exposure to COVID-19 and provide continuity of care for an established patient. Services were provided through a video synchronous discussion virtually to substitute for in-person clinic visit.

## 2020-09-03 PROBLEM — R10.9 RIGHT FLANK PAIN: Status: ACTIVE | Noted: 2020-09-01

## 2020-09-03 PROBLEM — N20.1 CALCULUS OF DISTAL RIGHT URETER: Status: ACTIVE | Noted: 2020-09-03

## 2020-10-07 ENCOUNTER — TELEPHONE (OUTPATIENT)
Dept: FAMILY MEDICINE CLINIC | Age: 60
End: 2020-10-07

## 2020-10-07 NOTE — TELEPHONE ENCOUNTER
I attempted to call the patient throughout today. I finally spoke with her regarding her Pro-Op appt tomorrow. Patient said she has not gotten her ekg done. I also told her that I do not have any paperwork from [facility] regarding her surgery. I called and left a message with them today to see what labs, imaging, and paperwork we need for the patient's apt. No one from that office got back with me. I told the patient if she had not gotten any pre-op labs and imaging done yet, then we could not clear her tomorrow for her surgery.  I will attempt to contact Urology again tomorrow because when I just called at 4:30pm, they phone message said they were closed

## 2020-10-12 RX ORDER — LISINOPRIL 20 MG/1
TABLET ORAL
Qty: 30 TAB | Refills: 0 | Status: SHIPPED | OUTPATIENT
Start: 2020-10-12 | End: 2020-10-13 | Stop reason: SDUPTHER

## 2020-10-13 RX ORDER — LISINOPRIL 20 MG/1
20 TABLET ORAL DAILY
Qty: 30 TAB | Refills: 0 | Status: ON HOLD | OUTPATIENT
Start: 2020-10-13 | End: 2021-10-06

## 2020-10-13 NOTE — TELEPHONE ENCOUNTER
Dulce Davis called for their medication refill.     Last Office visit:  09/01/2020  Last labs:  08/30/2020  Follow up visit:  10/15/2020

## 2020-11-30 RX ORDER — LANOLIN ALCOHOL/MO/W.PET/CERES
CREAM (GRAM) TOPICAL
Qty: 30 TAB | Refills: 0 | Status: SHIPPED | OUTPATIENT
Start: 2020-11-30 | End: 2021-11-01

## 2021-08-11 RX ORDER — ATORVASTATIN CALCIUM 40 MG/1
TABLET, FILM COATED ORAL
Qty: 30 TABLET | Refills: 0 | OUTPATIENT
Start: 2021-08-11

## 2021-08-11 RX ORDER — MONTELUKAST SODIUM 10 MG/1
TABLET ORAL
Qty: 30 TABLET | Refills: 0 | OUTPATIENT
Start: 2021-08-11

## 2021-08-12 DIAGNOSIS — Z91.89 ENCOUNTER FOR HEPATITIS C VIRUS SCREENING TEST FOR HIGH RISK PATIENT: ICD-10-CM

## 2021-08-12 DIAGNOSIS — E53.8 COBALAMIN DEFICIENCY: ICD-10-CM

## 2021-08-12 DIAGNOSIS — E55.9 VITAMIN D DEFICIENCY: ICD-10-CM

## 2021-08-12 DIAGNOSIS — E61.1 IRON DEFICIENCY: ICD-10-CM

## 2021-08-12 DIAGNOSIS — E78.5 DYSLIPIDEMIA: ICD-10-CM

## 2021-08-12 DIAGNOSIS — I10 ESSENTIAL HYPERTENSION: Primary | ICD-10-CM

## 2021-08-12 DIAGNOSIS — Z00.00 ENCOUNTER FOR ROUTINE ADULT HEALTH EXAMINATION WITHOUT ABNORMAL FINDINGS: ICD-10-CM

## 2021-08-12 DIAGNOSIS — Z11.59 ENCOUNTER FOR HEPATITIS C VIRUS SCREENING TEST FOR HIGH RISK PATIENT: ICD-10-CM

## 2021-08-12 RX ORDER — AMLODIPINE BESYLATE 10 MG/1
TABLET ORAL
Qty: 30 TABLET | Refills: 6 | Status: SHIPPED | OUTPATIENT
Start: 2021-08-12 | End: 2022-01-19 | Stop reason: SDUPTHER

## 2021-08-12 NOTE — TELEPHONE ENCOUNTER
Please contact patient and schedule appt. Labs ordered. Patient will need to get the labs done at the hospital 1-2 weeks before the appt. Once scheduled, we can send in the Rx with enough to last until the appt. Patient has cancelled her last 4 appointments, if she does not keep this appt, then she will not get anymore refills until she is actually seen.

## 2021-08-13 RX ORDER — MONTELUKAST SODIUM 10 MG/1
10 TABLET ORAL DAILY
Qty: 42 TABLET | Refills: 0 | Status: SHIPPED | OUTPATIENT
Start: 2021-08-13 | End: 2021-11-22 | Stop reason: SDUPTHER

## 2021-08-13 RX ORDER — ATORVASTATIN CALCIUM 40 MG/1
40 TABLET, FILM COATED ORAL DAILY
Qty: 42 TABLET | Refills: 0 | Status: SHIPPED | OUTPATIENT
Start: 2021-08-13 | End: 2022-01-19 | Stop reason: SDUPTHER

## 2021-10-01 ENCOUNTER — HOSPITAL ENCOUNTER (OUTPATIENT)
Dept: LAB | Age: 61
Discharge: HOME | End: 2021-10-01
Payer: COMMERCIAL

## 2021-10-01 LAB
ALBUMIN SERPL-MCNC: 3.6 G/DL (ref 3.5–4.7)
ALBUMIN/GLOB SERPL: 1.1 {RATIO}
ALP SERPL-CCNC: 69 U/L (ref 38–126)
ALT SERPL-CCNC: 119 U/L (ref 3–52)
ANION GAP SERPL CALC-SCNC: 9 MMOL/L
AST SERPL W P-5'-P-CCNC: 101 U/L (ref 14–74)
BILIRUB SERPL-MCNC: 1.3 MG/DL (ref 0.2–1)
BUN SERPL-MCNC: 25 MG/DL (ref 9–21)
BUN/CREAT SERPL: 31
CA-I BLD-MCNC: 8.9 MG/DL (ref 8.5–10.5)
CHLORIDE SERPL-SCNC: 104 MMOL/L (ref 94–111)
CO2 SERPL-SCNC: 26 MMOL/L (ref 21–33)
CREAT SERPL-MCNC: 0.8 MG/DL (ref 0.7–1.2)
ERYTHROCYTE [DISTWIDTH] IN BLOOD BY AUTOMATED COUNT: 13.3 % (ref 11.6–14.5)
GLOBULIN SER CALC-MCNC: 3.3 G/DL
GLUCOSE SERPL-MCNC: 97 MG/DL (ref 70–110)
HCT VFR BLD AUTO: 41.7 % (ref 35–45)
HGB BLD-MCNC: 12.4 G/DL (ref 12–16)
MCH RBC QN AUTO: 26.9 PG (ref 24–34)
MCHC RBC AUTO-ENTMCNC: 29.7 G/DL (ref 31–37)
MCV RBC AUTO: 90.5 FL (ref 78–100)
NRBC # BLD: 0 K/UL (ref 0–0.01)
NRBC BLD-RTO: 0 PER 100 WBC
PLATELET # BLD AUTO: 239 K/UL (ref 135–420)
PMV BLD AUTO: 11.1 FL (ref 9.2–11.8)
POTASSIUM SERPL-SCNC: 4 MMOL/L (ref 3.2–5.1)
PROT SERPL-MCNC: 6.9 G/DL (ref 6.1–8.4)
RBC # BLD AUTO: 4.61 M/UL (ref 4.2–5.3)
SODIUM SERPL-SCNC: 139 MMOL/L (ref 135–145)
TSH SERPL DL<=0.05 MIU/L-ACNC: 1.26 UIU/ML (ref 0.35–6.2)
WBC # BLD AUTO: 7.4 K/UL (ref 4.6–13.2)

## 2021-10-01 PROCEDURE — 84443 ASSAY THYROID STIM HORMONE: CPT

## 2021-10-01 PROCEDURE — 36415 COLL VENOUS BLD VENIPUNCTURE: CPT

## 2021-10-01 PROCEDURE — 82728 ASSAY OF FERRITIN: CPT

## 2021-10-01 PROCEDURE — 85027 COMPLETE CBC AUTOMATED: CPT

## 2021-10-01 PROCEDURE — 80061 LIPID PANEL: CPT

## 2021-10-01 PROCEDURE — 86803 HEPATITIS C AB TEST: CPT

## 2021-10-01 PROCEDURE — 82306 VITAMIN D 25 HYDROXY: CPT

## 2021-10-01 PROCEDURE — 83540 ASSAY OF IRON: CPT

## 2021-10-01 PROCEDURE — 80053 COMPREHEN METABOLIC PANEL: CPT

## 2021-10-01 PROCEDURE — 82607 VITAMIN B-12: CPT

## 2021-10-02 LAB
25(OH)D3 SERPL-MCNC: 25.1 NG/ML (ref 30–100)
CHOLEST SERPL-MCNC: 150 MG/DL
FERRITIN SERPL-MCNC: 75 NG/ML (ref 8–388)
HDLC SERPL-MCNC: 49 MG/DL (ref 40–60)
HDLC SERPL: 3.1 {RATIO} (ref 0–5)
IRON SATN MFR SERPL: 23 % (ref 20–50)
IRON SERPL-MCNC: 76 UG/DL (ref 50–175)
LDLC SERPL CALC-MCNC: 88 MG/DL (ref 0–100)
LIPID PROFILE,FLP: NORMAL
TIBC SERPL-MCNC: 324 UG/DL (ref 250–450)
TRIGL SERPL-MCNC: 65 MG/DL (ref ?–150)
VIT B12 SERPL-MCNC: 743 PG/ML (ref 211–911)
VLDLC SERPL CALC-MCNC: 13 MG/DL

## 2021-10-04 LAB
HCV AB SER IA-ACNC: 0.12 INDEX
HCV AB SERPL QL IA: NEGATIVE
HCV COMMENT,HCGAC: NORMAL

## 2021-10-06 ENCOUNTER — APPOINTMENT (OUTPATIENT)
Dept: NON INVASIVE DIAGNOSTICS | Age: 61
DRG: 291 | End: 2021-10-06
Attending: INTERNAL MEDICINE
Payer: COMMERCIAL

## 2021-10-06 ENCOUNTER — APPOINTMENT (OUTPATIENT)
Dept: GENERAL RADIOLOGY | Age: 61
DRG: 291 | End: 2021-10-06
Attending: EMERGENCY MEDICINE
Payer: COMMERCIAL

## 2021-10-06 ENCOUNTER — HOSPITAL ENCOUNTER (INPATIENT)
Age: 61
LOS: 2 days | Discharge: HOME | DRG: 291 | End: 2021-10-08
Attending: EMERGENCY MEDICINE | Admitting: INTERNAL MEDICINE
Payer: COMMERCIAL

## 2021-10-06 DIAGNOSIS — I50.9 ACUTE ON CHRONIC CONGESTIVE HEART FAILURE, UNSPECIFIED HEART FAILURE TYPE (HCC): Primary | ICD-10-CM

## 2021-10-06 DIAGNOSIS — I10 MALIGNANT HYPERTENSION: ICD-10-CM

## 2021-10-06 PROBLEM — J18.9 CAP (COMMUNITY ACQUIRED PNEUMONIA): Status: RESOLVED | Noted: 2021-10-06 | Resolved: 2021-10-06

## 2021-10-06 PROBLEM — I16.1 HYPERTENSIVE EMERGENCY: Status: ACTIVE | Noted: 2021-10-06

## 2021-10-06 PROBLEM — J18.9 CAP (COMMUNITY ACQUIRED PNEUMONIA): Status: ACTIVE | Noted: 2021-10-06

## 2021-10-06 LAB
ANION GAP SERPL CALC-SCNC: 5 MMOL/L
ATRIAL RATE: 106 BPM
BASOPHILS # BLD: 0 K/UL (ref 0–0.1)
BASOPHILS NFR BLD: 0 % (ref 0–2)
BNP SERPL-MCNC: 1530 PG/ML (ref 0–100)
BUN SERPL-MCNC: 28 MG/DL (ref 9–21)
BUN/CREAT SERPL: 28
CA-I BLD-MCNC: 8.7 MG/DL (ref 8.5–10.5)
CALCULATED P AXIS, ECG09: 30 DEGREES
CALCULATED R AXIS, ECG10: -20 DEGREES
CALCULATED T AXIS, ECG11: 87 DEGREES
CHLORIDE SERPL-SCNC: 107 MMOL/L (ref 94–111)
CO2 SERPL-SCNC: 24 MMOL/L (ref 21–33)
COVID-19 RAPID TEST, COVR: NOT DETECTED
CREAT SERPL-MCNC: 1 MG/DL (ref 0.7–1.2)
DIAGNOSIS, 93000: NORMAL
DIFFERENTIAL METHOD BLD: ABNORMAL
ECHO AO ROOT DIAM: 3.2 CM
ECHO AV CUSP MM: 1.7 CM
ECHO EST RA PRESSURE: 3 MMHG
ECHO LA AREA 2C: 31.6 CM2
ECHO LA AREA 4C: 37.8 CM2
ECHO LA MAJOR AXIS: 4.7 CM
ECHO LA MAJOR AXIS: 7.72 CM
ECHO LA TO AORTIC ROOT RATIO: 1.47
ECHO LV EDV A2C: 125 CM3
ECHO LV EDV A2C: 238 CM3
ECHO LV EDV A4C: 108 CM3
ECHO LV EJECTION FRACTION BIPLANE: 39.2 % (ref 55–100)
ECHO LV ESV A2C: 125 CM3
ECHO LV ESV A4C: 66.5 CM3
ECHO LV INTERNAL DIMENSION DIASTOLIC: 6.2 CM (ref 3.9–5.3)
ECHO LV INTERNAL DIMENSION SYSTOLIC: 5 CM
ECHO LV IVSD: 1 CM (ref 0.6–0.9)
ECHO LV MASS 2D: 295.4 G (ref 67–162)
ECHO LV MASS INDEX 2D: 154.7 G/M2 (ref 43–95)
ECHO LV POSTERIOR WALL DIASTOLIC: 1.2 CM (ref 0.6–0.9)
ECHO LVOT DIAM: 2.2 CM
ECHO MV MAX VELOCITY: 131 CM/S
ECHO MV MEAN GRADIENT: 3 MMHG
ECHO MV PEAK GRADIENT: 7 MMHG
ECHO MV VTI: 25 CM
ECHO RA AREA 4C: 22.7 CM2
ECHO RA MAJOR AXIS: 6.52 CM
ECHO RIGHT VENTRICULAR SYSTOLIC PRESSURE (RVSP): 41 MMHG
ECHO TV MEAN GRADIENT: 38 MMHG
ECHO TV REGURGITANT MAX VELOCITY: 307 CM/S
EOSINOPHIL # BLD: 0.2 K/UL (ref 0–0.4)
EOSINOPHIL NFR BLD: 2 % (ref 0–5)
ERYTHROCYTE [DISTWIDTH] IN BLOOD BY AUTOMATED COUNT: 13.3 % (ref 11.6–14.5)
GLUCOSE SERPL-MCNC: 174 MG/DL (ref 70–110)
HCT VFR BLD AUTO: 43.2 % (ref 35–45)
HGB BLD-MCNC: 12.9 G/DL (ref 12–16)
IMM GRANULOCYTES # BLD AUTO: 0 K/UL (ref 0–0.04)
IMM GRANULOCYTES NFR BLD AUTO: 0 % (ref 0–0.5)
LA VOL DISK BP: 148 CM3 (ref 22–52)
LYMPHOCYTES # BLD: 2.3 K/UL (ref 0.9–3.6)
LYMPHOCYTES NFR BLD: 24 % (ref 21–52)
MCH RBC QN AUTO: 27.4 PG (ref 24–34)
MCHC RBC AUTO-ENTMCNC: 29.9 G/DL (ref 31–37)
MCV RBC AUTO: 91.7 FL (ref 78–100)
MONOCYTES # BLD: 0.4 K/UL (ref 0.05–1.2)
MONOCYTES NFR BLD: 4 % (ref 3–10)
NEUTS SEG # BLD: 6.7 K/UL (ref 1.8–8)
NEUTS SEG NFR BLD: 70 % (ref 40–73)
NRBC # BLD: 0 K/UL (ref 0–0.01)
NRBC BLD-RTO: 0 PER 100 WBC
P-R INTERVAL, ECG05: 151 MS
PLATELET # BLD AUTO: 247 K/UL (ref 135–420)
PMV BLD AUTO: 11.2 FL (ref 9.2–11.8)
POTASSIUM SERPL-SCNC: 4.4 MMOL/L (ref 3.2–5.1)
Q-T INTERVAL, ECG07: 353 MS
QRS DURATION, ECG06: 98 MS
QTC CALCULATION (BEZET), ECG08: 467 MS
RBC # BLD AUTO: 4.71 M/UL (ref 4.2–5.3)
SARS-COV-2, COV2: NORMAL
SARS-COV-2, NAA: NOT DETECTED
SODIUM SERPL-SCNC: 136 MMOL/L (ref 135–145)
SPECIMEN SOURCE: NORMAL
TROPONIN I SERPL-MCNC: 0.02 NG/ML (ref 0.02–0.05)
TROPONIN I SERPL-MCNC: 0.02 NG/ML (ref 0.02–0.05)
TROPONIN I SERPL-MCNC: 0.03 NG/ML (ref 0.02–0.05)
TROPONIN I SERPL-MCNC: 0.03 NG/ML (ref 0.02–0.05)
VENTRICULAR RATE, ECG03: 105 BPM
WBC # BLD AUTO: 9.6 K/UL (ref 4.6–13.2)

## 2021-10-06 PROCEDURE — 93005 ELECTROCARDIOGRAM TRACING: CPT

## 2021-10-06 PROCEDURE — 87635 SARS-COV-2 COVID-19 AMP PRB: CPT

## 2021-10-06 PROCEDURE — 65270000029 HC RM PRIVATE

## 2021-10-06 PROCEDURE — 74011000258 HC RX REV CODE- 258: Performed by: EMERGENCY MEDICINE

## 2021-10-06 PROCEDURE — 84484 ASSAY OF TROPONIN QUANT: CPT

## 2021-10-06 PROCEDURE — 74011250636 HC RX REV CODE- 250/636: Performed by: NURSE PRACTITIONER

## 2021-10-06 PROCEDURE — 74011250636 HC RX REV CODE- 250/636: Performed by: INTERNAL MEDICINE

## 2021-10-06 PROCEDURE — 74011250637 HC RX REV CODE- 250/637: Performed by: NURSE PRACTITIONER

## 2021-10-06 PROCEDURE — 74011250637 HC RX REV CODE- 250/637: Performed by: EMERGENCY MEDICINE

## 2021-10-06 PROCEDURE — 71045 X-RAY EXAM CHEST 1 VIEW: CPT

## 2021-10-06 PROCEDURE — 96374 THER/PROPH/DIAG INJ IV PUSH: CPT

## 2021-10-06 PROCEDURE — U0003 INFECTIOUS AGENT DETECTION BY NUCLEIC ACID (DNA OR RNA); SEVERE ACUTE RESPIRATORY SYNDROME CORONAVIRUS 2 (SARS-COV-2) (CORONAVIRUS DISEASE [COVID-19]), AMPLIFIED PROBE TECHNIQUE, MAKING USE OF HIGH THROUGHPUT TECHNOLOGIES AS DESCRIBED BY CMS-2020-01-R: HCPCS

## 2021-10-06 PROCEDURE — 83880 ASSAY OF NATRIURETIC PEPTIDE: CPT

## 2021-10-06 PROCEDURE — 74011250636 HC RX REV CODE- 250/636: Performed by: EMERGENCY MEDICINE

## 2021-10-06 PROCEDURE — 93321 DOPPLER ECHO F-UP/LMTD STD: CPT

## 2021-10-06 PROCEDURE — 87040 BLOOD CULTURE FOR BACTERIA: CPT

## 2021-10-06 PROCEDURE — 99285 EMERGENCY DEPT VISIT HI MDM: CPT

## 2021-10-06 PROCEDURE — 80048 BASIC METABOLIC PNL TOTAL CA: CPT

## 2021-10-06 PROCEDURE — 85025 COMPLETE CBC W/AUTO DIFF WBC: CPT

## 2021-10-06 RX ORDER — LISINOPRIL 20 MG/1
20 TABLET ORAL DAILY
Status: DISCONTINUED | OUTPATIENT
Start: 2021-10-06 | End: 2021-10-06

## 2021-10-06 RX ORDER — ACETAMINOPHEN 650 MG/1
650 SUPPOSITORY RECTAL
Status: DISCONTINUED | OUTPATIENT
Start: 2021-10-06 | End: 2021-10-08 | Stop reason: HOSPADM

## 2021-10-06 RX ORDER — LANOLIN ALCOHOL/MO/W.PET/CERES
1 CREAM (GRAM) TOPICAL DAILY
Status: DISCONTINUED | OUTPATIENT
Start: 2021-10-06 | End: 2021-10-08 | Stop reason: HOSPADM

## 2021-10-06 RX ORDER — SODIUM CHLORIDE 0.9 % (FLUSH) 0.9 %
5-40 SYRINGE (ML) INJECTION EVERY 8 HOURS
Status: DISCONTINUED | OUTPATIENT
Start: 2021-10-06 | End: 2021-10-08 | Stop reason: HOSPADM

## 2021-10-06 RX ORDER — FUROSEMIDE 10 MG/ML
40 INJECTION INTRAMUSCULAR; INTRAVENOUS
Status: COMPLETED | OUTPATIENT
Start: 2021-10-06 | End: 2021-10-06

## 2021-10-06 RX ORDER — ONDANSETRON 2 MG/ML
4 INJECTION INTRAMUSCULAR; INTRAVENOUS
Status: DISCONTINUED | OUTPATIENT
Start: 2021-10-06 | End: 2021-10-08 | Stop reason: HOSPADM

## 2021-10-06 RX ORDER — FUROSEMIDE 10 MG/ML
20 INJECTION INTRAMUSCULAR; INTRAVENOUS 2 TIMES DAILY
Status: DISCONTINUED | OUTPATIENT
Start: 2021-10-06 | End: 2021-10-06

## 2021-10-06 RX ORDER — ENOXAPARIN SODIUM 100 MG/ML
40 INJECTION SUBCUTANEOUS DAILY
Status: DISCONTINUED | OUTPATIENT
Start: 2021-10-06 | End: 2021-10-08 | Stop reason: HOSPADM

## 2021-10-06 RX ORDER — ASPIRIN 325 MG
325 TABLET ORAL DAILY
Status: DISCONTINUED | OUTPATIENT
Start: 2021-10-06 | End: 2021-10-07

## 2021-10-06 RX ORDER — TAMSULOSIN HYDROCHLORIDE 0.4 MG/1
0.4 CAPSULE ORAL DAILY
Status: DISCONTINUED | OUTPATIENT
Start: 2021-10-06 | End: 2021-10-06

## 2021-10-06 RX ORDER — POTASSIUM CHLORIDE 750 MG/1
20 TABLET, EXTENDED RELEASE ORAL DAILY
Status: DISCONTINUED | OUTPATIENT
Start: 2021-10-06 | End: 2021-10-08 | Stop reason: HOSPADM

## 2021-10-06 RX ORDER — FLUTICASONE PROPIONATE 50 MCG
2 SPRAY, SUSPENSION (ML) NASAL DAILY
Status: DISCONTINUED | OUTPATIENT
Start: 2021-10-06 | End: 2021-10-08 | Stop reason: HOSPADM

## 2021-10-06 RX ORDER — FUROSEMIDE 10 MG/ML
40 INJECTION INTRAMUSCULAR; INTRAVENOUS 2 TIMES DAILY
Status: DISCONTINUED | OUTPATIENT
Start: 2021-10-06 | End: 2021-10-08 | Stop reason: HOSPADM

## 2021-10-06 RX ORDER — LOSARTAN POTASSIUM 25 MG/1
50 TABLET ORAL DAILY
Status: DISCONTINUED | OUTPATIENT
Start: 2021-10-07 | End: 2021-10-07

## 2021-10-06 RX ORDER — SODIUM CHLORIDE 0.9 % (FLUSH) 0.9 %
5-40 SYRINGE (ML) INJECTION AS NEEDED
Status: DISCONTINUED | OUTPATIENT
Start: 2021-10-06 | End: 2021-10-08 | Stop reason: HOSPADM

## 2021-10-06 RX ORDER — MONTELUKAST SODIUM 10 MG/1
10 TABLET ORAL DAILY
Status: DISCONTINUED | OUTPATIENT
Start: 2021-10-06 | End: 2021-10-08 | Stop reason: HOSPADM

## 2021-10-06 RX ORDER — POLYETHYLENE GLYCOL 3350 17 G/17G
17 POWDER, FOR SOLUTION ORAL DAILY PRN
Status: DISCONTINUED | OUTPATIENT
Start: 2021-10-06 | End: 2021-10-08 | Stop reason: HOSPADM

## 2021-10-06 RX ORDER — CARVEDILOL 12.5 MG/1
25 TABLET ORAL
Status: COMPLETED | OUTPATIENT
Start: 2021-10-06 | End: 2021-10-06

## 2021-10-06 RX ORDER — ERGOCALCIFEROL 1.25 MG/1
50000 CAPSULE ORAL DAILY
Status: DISCONTINUED | OUTPATIENT
Start: 2021-10-06 | End: 2021-10-06

## 2021-10-06 RX ORDER — AMLODIPINE BESYLATE 5 MG/1
10 TABLET ORAL
Status: COMPLETED | OUTPATIENT
Start: 2021-10-06 | End: 2021-10-06

## 2021-10-06 RX ORDER — AMLODIPINE BESYLATE 5 MG/1
10 TABLET ORAL DAILY
Status: DISCONTINUED | OUTPATIENT
Start: 2021-10-06 | End: 2021-10-08 | Stop reason: HOSPADM

## 2021-10-06 RX ORDER — ASCORBIC ACID 500 MG
500 TABLET ORAL DAILY
Status: DISCONTINUED | OUTPATIENT
Start: 2021-10-06 | End: 2021-10-08 | Stop reason: HOSPADM

## 2021-10-06 RX ORDER — ONDANSETRON 4 MG/1
4 TABLET, ORALLY DISINTEGRATING ORAL
Status: DISCONTINUED | OUTPATIENT
Start: 2021-10-06 | End: 2021-10-08 | Stop reason: HOSPADM

## 2021-10-06 RX ORDER — HYDRALAZINE HYDROCHLORIDE 20 MG/ML
20 INJECTION INTRAMUSCULAR; INTRAVENOUS
Status: DISCONTINUED | OUTPATIENT
Start: 2021-10-06 | End: 2021-10-08 | Stop reason: HOSPADM

## 2021-10-06 RX ORDER — ATORVASTATIN CALCIUM 40 MG/1
40 TABLET, FILM COATED ORAL DAILY
Status: DISCONTINUED | OUTPATIENT
Start: 2021-10-06 | End: 2021-10-08 | Stop reason: HOSPADM

## 2021-10-06 RX ORDER — ACETAMINOPHEN 325 MG/1
650 TABLET ORAL
Status: DISCONTINUED | OUTPATIENT
Start: 2021-10-06 | End: 2021-10-08 | Stop reason: HOSPADM

## 2021-10-06 RX ORDER — LABETALOL HYDROCHLORIDE 5 MG/ML
10 INJECTION, SOLUTION INTRAVENOUS ONCE
Status: DISCONTINUED | OUTPATIENT
Start: 2021-10-06 | End: 2021-10-06

## 2021-10-06 RX ORDER — LOSARTAN POTASSIUM 25 MG/1
25 TABLET ORAL
Status: COMPLETED | OUTPATIENT
Start: 2021-10-06 | End: 2021-10-06

## 2021-10-06 RX ORDER — LANOLIN ALCOHOL/MO/W.PET/CERES
1000 CREAM (GRAM) TOPICAL DAILY
Status: DISCONTINUED | OUTPATIENT
Start: 2021-10-06 | End: 2021-10-08 | Stop reason: HOSPADM

## 2021-10-06 RX ORDER — CARVEDILOL 12.5 MG/1
25 TABLET ORAL 2 TIMES DAILY WITH MEALS
Status: DISCONTINUED | OUTPATIENT
Start: 2021-10-06 | End: 2021-10-08 | Stop reason: HOSPADM

## 2021-10-06 RX ADMIN — FERROUS SULFATE TAB 325 MG (65 MG ELEMENTAL FE) 325 MG: 325 (65 FE) TAB at 08:28

## 2021-10-06 RX ADMIN — AZITHROMYCIN MONOHYDRATE 500 MG: 500 INJECTION, POWDER, LYOPHILIZED, FOR SOLUTION INTRAVENOUS at 04:08

## 2021-10-06 RX ADMIN — CARVEDILOL 25 MG: 12.5 TABLET, FILM COATED ORAL at 08:26

## 2021-10-06 RX ADMIN — ASPIRIN 325 MG ORAL TABLET 325 MG: 325 PILL ORAL at 08:27

## 2021-10-06 RX ADMIN — AMLODIPINE BESYLATE 10 MG: 5 TABLET ORAL at 08:26

## 2021-10-06 RX ADMIN — FUROSEMIDE 40 MG: 10 INJECTION, SOLUTION INTRAMUSCULAR; INTRAVENOUS at 00:55

## 2021-10-06 RX ADMIN — Medication 10 ML: at 22:13

## 2021-10-06 RX ADMIN — FUROSEMIDE 40 MG: 10 INJECTION, SOLUTION INTRAMUSCULAR; INTRAVENOUS at 17:33

## 2021-10-06 RX ADMIN — POTASSIUM CHLORIDE 20 MEQ: 10 TABLET, EXTENDED RELEASE ORAL at 08:27

## 2021-10-06 RX ADMIN — FUROSEMIDE 40 MG: 10 INJECTION, SOLUTION INTRAMUSCULAR; INTRAVENOUS at 08:27

## 2021-10-06 RX ADMIN — ATORVASTATIN CALCIUM 40 MG: 40 TABLET, FILM COATED ORAL at 08:27

## 2021-10-06 RX ADMIN — CARVEDILOL 25 MG: 12.5 TABLET, FILM COATED ORAL at 17:33

## 2021-10-06 RX ADMIN — AMLODIPINE BESYLATE 10 MG: 5 TABLET ORAL at 00:51

## 2021-10-06 RX ADMIN — CYANOCOBALAMIN TAB 500 MCG 1000 MCG: 500 TAB at 08:27

## 2021-10-06 RX ADMIN — CEFTRIAXONE 1 G: 1 INJECTION, POWDER, FOR SOLUTION INTRAMUSCULAR; INTRAVENOUS at 03:01

## 2021-10-06 RX ADMIN — MONTELUKAST 10 MG: 10 TABLET, FILM COATED ORAL at 08:27

## 2021-10-06 RX ADMIN — ENOXAPARIN SODIUM 40 MG: 40 INJECTION SUBCUTANEOUS at 08:27

## 2021-10-06 RX ADMIN — Medication 10 ML: at 17:34

## 2021-10-06 RX ADMIN — OXYCODONE HYDROCHLORIDE AND ACETAMINOPHEN 500 MG: 500 TABLET ORAL at 08:27

## 2021-10-06 RX ADMIN — LOSARTAN POTASSIUM 25 MG: 25 TABLET, FILM COATED ORAL at 17:33

## 2021-10-06 RX ADMIN — CARVEDILOL 25 MG: 12.5 TABLET, FILM COATED ORAL at 00:51

## 2021-10-06 NOTE — ASSESSMENT & PLAN NOTE
This is an acute exacerbation of chronic CHF  Lasix 20 mg twice daily IV  Low-salt diet cardiac  Continue potassium  Continue Lipitor  Cardiology consult  Echocardiogram last 1 July 2020 ejection fraction 35 to 40%

## 2021-10-06 NOTE — ROUTINE PROCESS
TRANSFER - OUT REPORT:    Verbal report given to [facility]) on General Gerardo  being transferred to [address] [place] [address](unit) for routine progression of care       Report consisted of patients Situation, Background, Assessment and   Recommendations(SBAR). Information from the following report(s) ED Summary was reviewed with the receiving nurse. Lines:   Peripheral IV 10/06/21 Left Antecubital (Active)   Site Assessment Clean, dry, & intact 10/06/21 0040   Phlebitis Assessment 0 10/06/21 0040   Infiltration Assessment 0 10/06/21 0040   Dressing Status Clean, dry, & intact 10/06/21 0040   Dressing Type Transparent 10/06/21 0040   Hub Color/Line Status Pink;Patent; Flushed 10/06/21 0040        Opportunity for questions and clarification was provided.       Patient transported with:   Monitor  Registered Nurse

## 2021-10-06 NOTE — H&P
History and Physical    Subjective:     Vikram Chavarria is a 63 y.o. female  has a past medical history of Calculus of kidney, Congestive heart failure ([address] Essential hypertension, and Goiter. Patient seen at bedside in the emergency department. Patient presented to the emergency room tonight having shortness of breath and chest pain for the past few days. No chest pain or shortness of breath is reported to me at this time patient states it has resolved. .  Patient is allergic to nitro so did not get nitro in the emergency room. Patient takes an aspirin a day states she took her aspirin today. Patient was treated for hypertensive emergency with labetalol patient responded well to the labetalol. No headache no neurological symptoms are reported. She was treated for possible infiltrate in the ER when reviewing chest x-ray no infiltrate noted + pulmonary congestion consistent with congestive heart failure. Antibiotics not be continued at this time. Patient had her last echo in 2020 with a 35 to 40% EF. Patient will be admitted to the hospitalist group cardiology consult echocardiogram cycle troponins a.m. labs. Lasix will be continued 20 mg twice a day IV hypertension meds will be continued and hydralazine as needed for high blood pressure.           Past Medical History:   Diagnosis Date    Calculus of kidney     Congestive heart failure ([address]     Essential hypertension     Goiter       Past Surgical History:   Procedure Laterality Date    HX  SECTION      HX COLONOSCOPY  2019    HX HEART CATHETERIZATION      HX TUBAL LIGATION       Family History   Problem Relation Age of Onset    Cancer Mother         breast    Hypertension Mother     Heart Surgery Mother     Thyroid Disease Mother     Thyroid Disease Sister       Social History     Tobacco Use    Smoking status: Never Smoker    Smokeless tobacco: Never Used   Substance Use Topics    Alcohol use: Not Currently Prior to Admission medications    Medication Sig Start Date End Date Taking? Authorizing Provider   atorvastatin (LIPITOR) 40 mg tablet Take 1 Tablet by mouth daily. 8/13/21  Yes Sapna Abel NP   montelukast (Singulair) 10 mg tablet Take 1 Tablet by mouth daily. 8/13/21  Yes Sapna Abel NP   amLODIPine (NORVASC) 10 mg tablet Take 1 tablet by mouth once daily 8/12/21  Yes Marianne Palma NP   ferrous sulfate 325 mg (65 mg iron) tablet TAKE 1 TABLET BY MOUTH ONCE DAILY WITH  VITAMIN  C 11/30/20  Yes Sapna Abel NP   lisinopriL (PRINIVIL, ZESTRIL) 20 mg tablet Take 1 Tab by mouth daily. 10/13/20  Yes Sapna Abel NP   tamsulosin (Flomax) 0.4 mg capsule Take 1 Cap by mouth daily. 9/1/20  Yes Sapna Abel NP   ketorolac (TORADOL) 10 mg tablet Take 1 Tab by mouth every six (6) hours as needed for Pain. 9/1/20  Yes Sapna Abel NP   ondansetron (ZOFRAN ODT) 8 mg disintegrating tablet Take 1 Tab by mouth every eight (8) hours as needed for Nausea or Vomiting. 9/1/20  Yes Sapna Abel NP   carvediloL (COREG) 25 mg tablet Take 1 Tab by mouth two (2) times daily (with meals). 8/24/20  Yes Lisa Mclaughlin MD   aspirin (ASPIRIN) 325 mg tablet Take 325 mg by mouth daily. Yes Provider, Historical   ascorbic acid, vitamin C, (Vitamin C) 500 mg tablet Take  by mouth. Yes Provider, Historical   furosemide (Lasix) 40 mg tablet Take 40 mg by mouth daily. Yes Provider, Historical   ergocalciferol (Vitamin D2) 1,250 mcg (50,000 unit) capsule Take 50,000 Units by mouth. Yes Provider, Historical   cyanocobalamin 1,000 mcg tablet Take 1,000 mcg by mouth daily. Yes Provider, Historical   potassium chloride (K-DUR, KLOR-CON) 20 mEq tablet Take 20 mEq by mouth daily. Yes Provider, Historical   triamcinolone (NASACORT AQ) 55 mcg nasal inhaler 2 Sprays by Both Nostrils route daily.    Yes Provider, Historical     Allergies   Allergen Reactions    Lisinopril Cough     Patient is still taking medication  Nitroglycerin Other (comments)     Headaches with patch only. Can take sublingual without any difficulty         Review of Systems   Constitutional: Negative for fever. Respiratory: Positive for shortness of breath. Cardiovascular: Positive for chest pain and leg swelling. Negative for orthopnea. Gastrointestinal: Negative for nausea and vomiting. Musculoskeletal: Negative for neck pain. Neurological: Negative for dizziness. Objective:   VITALS:    Visit Vitals  BP (!) 148/96 (BP 1 Location: Right upper arm, BP Patient Position: At rest)   Pulse 87   Temp 97.7 °F (36.5 °C)   Resp 18   Ht 5' 6\" (1.676 m)   Wt 81.6 kg (180 lb)   SpO2 97%   BMI 29.05 kg/m²       Physical Exam  Vitals and nursing note reviewed. Constitutional:       General: She is not in acute distress. Appearance: She is well-developed. She is ill-appearing. She is not toxic-appearing or diaphoretic. HENT:      Head: Normocephalic and atraumatic. Eyes:      Conjunctiva/sclera: Conjunctivae normal.      Pupils: Pupils are equal, round, and reactive to light. Cardiovascular:      Rate and Rhythm: Normal rate and regular rhythm. Pulses: Normal pulses. Heart sounds: Normal heart sounds. Pulmonary:      Effort: Pulmonary effort is normal. No respiratory distress. Breath sounds: Normal breath sounds. No stridor. No wheezing or rhonchi. Abdominal:      General: Bowel sounds are normal. There is no distension. Palpations: Abdomen is soft. Tenderness: There is no abdominal tenderness. Musculoskeletal:         General: Normal range of motion. Cervical back: Normal range of motion and neck supple. Right lower leg: Edema present. Left lower leg: Edema present. Comments: + Bilateral lower leg edema no tenderness no calf tenderness no Homans' sign + palpable pedal pulses patient states this edema is chronic for her   Skin:     General: Skin is warm and dry.       Capillary Refill: Capillary refill takes less than 2 seconds. Neurological:      General: No focal deficit present. Mental Status: She is alert and oriented to person, place, and time. Deep Tendon Reflexes: Reflexes are normal and symmetric. Psychiatric:         Behavior: Behavior normal.         Thought Content: Thought content normal.         Judgment: Judgment normal.         _______________________________________________________________________  Care Plan discussed with:    Comments   Patient X    Family      RN X    Care Manager                    Consultant:      _______________________________________________________________________  Expected  Disposition:   Home with Family X   HH/PT/OT/RN    SNF/LTC    IRMA    ________________________________________________________________________  TOTAL TIME:  48 Minutes    Critical Care Provided     Minutes non procedure based      Comments    X Reviewed previous records   >50% of visit spent in counseling and coordination of care X Discussion with patient and/or family and questions answered       ________________________________________________________________________    Labs:  Recent Results (from the past 24 hour(s))   CBC WITH AUTOMATED DIFF    Collection Time: 10/06/21 12:40 AM   Result Value Ref Range    WBC 9.6 4.6 - 13.2 K/uL    RBC 4.71 4.20 - 5.30 M/uL    HGB 12.9 12.0 - 16.0 g/dL    HCT 43.2 35.0 - 45.0 %    MCV 91.7 78.0 - 100.0 FL    MCH 27.4 24.0 - 34.0 PG    MCHC 29.9 (L) 31.0 - 37.0 g/dL    RDW 13.3 11.6 - 14.5 %    PLATELET 203 778 - 962 K/uL    MPV 11.2 9.2 - 11.8 FL    NRBC 0.0 0.0  WBC    ABSOLUTE NRBC 0.00 0.00 - 0.01 K/uL    NEUTROPHILS 70 40 - 73 %    LYMPHOCYTES 24 21 - 52 %    MONOCYTES 4 3 - 10 %    EOSINOPHILS 2 0 - 5 %    BASOPHILS 0 0 - 2 %    IMMATURE GRANULOCYTES 0 0 - 0.5 %    ABS. NEUTROPHILS 6.7 1.8 - 8.0 K/UL    ABS. LYMPHOCYTES 2.3 0.9 - 3.6 K/UL    ABS. MONOCYTES 0.4 0.05 - 1.2 K/UL    ABS. EOSINOPHILS 0.2 0.0 - 0.4 K/UL    ABS. BASOPHILS 0.0 0.0 - 0.1 K/UL    ABS. IMM. GRANS. 0.0 0.00 - 0.04 K/UL    DF AUTOMATED     METABOLIC PANEL, BASIC    Collection Time: 10/06/21 12:40 AM   Result Value Ref Range    Sodium 136 135 - 145 mmol/L    Potassium 4.4 3.2 - 5.1 mmol/L    Chloride 107 94 - 111 mmol/L    CO2 24 21 - 33 mmol/L    Anion gap 5 mmol/L    Glucose 174 (H) 70 - 110 mg/dL    BUN 28 (H) 9 - 21 mg/dL    Creatinine 1.00 0.70 - 1.20 mg/dL    BUN/Creatinine ratio 28      GFR est AA >60 ml/min/1.73m2    GFR est non-AA 56 ml/min/1.73m2    Calcium 8.7 8.5 - 10.5 mg/dL   TROPONIN I    Collection Time: 10/06/21 12:40 AM   Result Value Ref Range    Troponin-I, Qt. 0.03 0.02 - 0.05 ng/mL   BNP    Collection Time: 10/06/21 12:40 AM   Result Value Ref Range    BNP 1,530 (H) 0 - 100 pg/mL   COVID-19 RAPID TEST    Collection Time: 10/06/21 12:47 AM   Result Value Ref Range    Specimen source Nasopharynx      COVID-19 rapid test Not Detected Not Detected     SARS-COV-2    Collection Time: 10/06/21 12:47 AM   Result Value Ref Range    SARS-CoV-2 Please find results under separate order         Imaging:  XR CHEST PORT    Result Date: 10/6/2021  EXAM: Chest radiograph  CLINICAL INDICATION/HISTORY: Dyspnea    --Additional history: None. COMPARISON: 05/19/2020 TECHNIQUE: Frontal view of the chest _______________ FINDINGS: SUPPORT DEVICES: None. HEART AND MEDIASTINUM: Cardiac silhouette is enlarged Normal mediastinal contours . There is prominence of the central pulmonary vasculature. LUNGS AND PLEURAL SPACES: Minimal right base opacity with blunting of the right costophrenic sulcus. Sharp left costophrenic sulcus. No pneumothoraces. BONY THORAX AND SOFT TISSUES: Unremarkable. _______________     Central pulmonary vascular congestion with minimal right base atelectasis/airspace disease with possible trace right pleural effusion.        Assessment & Plan:       Acute exacerbation of CHF (congestive heart failure) (HCC)  This is an acute exacerbation of chronic CHF  Lasix 20 mg twice daily IV  Low-salt diet cardiac  Continue potassium  Continue Lipitor  Cardiology consult  Echocardiogram last 1 July 2020 ejection fraction 35 to 40%      Hypertensive emergency  This is a acute on chronic problem  patient is compliant with blood pressure medicine  No headache reported no neurological symptoms  Patient treated in ED with labetalol  Continue lisinopril, Coreg, Norvasc  Cardiology consult echocardiogram        Code Status: Full    Prophylaxis:  Lovenox 40 mg subcu daily         Electronically Signed : Bobby Wright ENP-C, FNP-C, [address] voice recognition was used to generate this report, which may have resulted in some phonetic based errors in grammar and contents.  Even though attempts were made to correct all the mistakes, some may have been missed, and remained in the body of the document

## 2021-10-06 NOTE — CONSULTS
[facility] CARDIOLOGY  CARDIOLOGY CONSULTATION    REASON FOR CONSULT: Acute heart failure    REQUESTING PROVIDER: ANTHONY Osman NP    CHIEF COMPLAINT: Shortness of breath    HISTORY OF PRESENT ILLNESS:  Sandee Aguilar is a 63y.o. year-old female with past medical history significant for hypertension and heart failure who was seen today for evaluation of acute heart failure. The patient presented to the [facility] ER this morning for complaints of chest pain and shortness of breath. Her symptoms have been worse with exertion; ongoing for several days. She did not take any of her medications yesterday. She was given labetalol in the ER for hypertensive urgency with some improvement noted. She is a patient of Dr. Masha Pal with [facility]. Rapid Covid testing was negative; PCR is pending. Records from hospital admission course thus far reviewed. Telemetry reviewed. No events overnight. The patient is in sinus rhythm with PACs and PVCs. INPATIENT MEDICATIONS:  Home medications reviewed.     Current Facility-Administered Medications:     sodium chloride (NS) flush 5-40 mL, 5-40 mL, IntraVENous, Q8H, Neena Orozco NP    sodium chloride (NS) flush 5-40 mL, 5-40 mL, IntraVENous, PRN, Adán Tavarez NP    acetaminophen (TYLENOL) tablet 650 mg, 650 mg, Oral, Q6H PRN **OR** acetaminophen (TYLENOL) suppository 650 mg, 650 mg, Rectal, Q6H PRN, Adán Tavarez NP    polyethylene glycol (MIRALAX) packet 17 g, 17 g, Oral, DAILY PRN, Evi CORDOVA NP    ondansetron (ZOFRAN ODT) tablet 4 mg, 4 mg, Oral, Q8H PRN **OR** ondansetron (ZOFRAN) injection 4 mg, 4 mg, IntraVENous, Q6H PRN, Evi CORDOVA NP    enoxaparin (LOVENOX) injection 40 mg, 40 mg, SubCUTAneous, DAILY, Neena Orozco NP, 40 mg at 10/06/21 0827    [START ON 10/7/2021] cefTRIAXone (ROCEPHIN) 1 g in 0.9% sodium chloride (MBP/ADV) 50 mL MBP, 1 g, IntraVENous, Q24H, MD Emeli Calabrese Morning ON 10/7/2021] azithromycin (ZITHROMAX) 500 mg in 0.9% sodium chloride 250 mL (VIAL-MATE), 500 mg, IntraVENous, Q24H, Augusta Felder MD    hydrALAZINE (APRESOLINE) 20 mg/mL injection 20 mg, 20 mg, IntraVENous, Q6H PRN, Zoe CORDOVA NP    amLODIPine (NORVASC) tablet 10 mg, 10 mg, Oral, DAILY, Zoe CORDOVA, NP, 10 mg at 10/06/21 1057    ascorbic acid (vitamin C) (VITAMIN C) tablet 500 mg, 500 mg, Oral, DAILY, Zoe CORDOVA, NP, 500 mg at 10/06/21 0827    aspirin tablet 325 mg, 325 mg, Oral, DAILY, Zoe CORDOVA NP, 325 mg at 10/06/21 0827    atorvastatin (LIPITOR) tablet 40 mg, 40 mg, Oral, DAILY, Zoe CORDOVA, NP, 40 mg at 10/06/21 0827    carvediloL (COREG) tablet 25 mg, 25 mg, Oral, BID WITH MEALS, Zoe CORDOVA NP, 25 mg at 10/06/21 4508    cyanocobalamin (VITAMIN B12) tablet 1,000 mcg, 1,000 mcg, Oral, DAILY, Zoe CORDOVA NP, 1,000 mcg at 10/06/21 0827    ferrous sulfate tablet 325 mg, 1 Tablet, Oral, DAILY, Zoe CORDOVA NP, 325 mg at 10/06/21 0262    montelukast (SINGULAIR) tablet 10 mg, 10 mg, Oral, DAILY, Zoe CORDOVA NP, 10 mg at 10/06/21 0827    potassium chloride (KLOR-CON) tablet 20 mEq, 20 mEq, Oral, DAILY, Zoe CORDOVA NP, 20 mEq at 10/06/21 0827    fluticasone propionate (FLONASE) 50 mcg/actuation nasal spray 2 Spray, 2 Spray, Both Nostrils, DAILY, Neena Orozco NP    furosemide (LASIX) injection 40 mg, 40 mg, IntraVENous, BID, Augusta Felder MD, 40 mg at 10/06/21 4015     ALLERGIES:  Allergies reviewed with the patient,  Allergies   Allergen Reactions    Lisinopril Cough     Patient is still taking medication     Nitroglycerin Other (comments)     Headaches with patch only. Can take sublingual without any difficulty    . FAMILY HISTORY:  Family history reviewed. SOCIAL HISTORY:  Notable for no tobacco use, no alcohol use, and no illicit drug use.       REVIEW OF SYSTEMS:  Complete review of systems performed, pertinents noted above, all other systems are negative. PHYSICAL EXAMINATION:  Vital sign assessment reveal a blood pressure of 160/100 and pulse rate of 96. Cardiovascular exam has a heart with a regular rate and rhythm on telemetry. Respiratory exam reveals no supplemental oxygen use. Neurologic exam is nonfocal. Further exam was deferred due to COVID-19 crisis. Recent labs results and imaging reviewed. Notable findings include:   Recent Results (from the past 24 hour(s))   EKG, 12 LEAD, INITIAL    Collection Time: 10/06/21 12:13 AM   Result Value Ref Range    Ventricular Rate 105 BPM    Atrial Rate 106 BPM    P-R Interval 151 ms    QRS Duration 98 ms    Q-T Interval 353 ms    QTC Calculation (Bezet) 467 ms    Calculated P Axis 30 degrees    Calculated R Axis -20 degrees    Calculated T Axis 87 degrees    Diagnosis       Sinus tachycardia  Left atrial enlargement  Left ventricular hypertrophy     CBC WITH AUTOMATED DIFF    Collection Time: 10/06/21 12:40 AM   Result Value Ref Range    WBC 9.6 4.6 - 13.2 K/uL    RBC 4.71 4.20 - 5.30 M/uL    HGB 12.9 12.0 - 16.0 g/dL    HCT 43.2 35.0 - 45.0 %    MCV 91.7 78.0 - 100.0 FL    MCH 27.4 24.0 - 34.0 PG    MCHC 29.9 (L) 31.0 - 37.0 g/dL    RDW 13.3 11.6 - 14.5 %    PLATELET 268 466 - 276 K/uL    MPV 11.2 9.2 - 11.8 FL    NRBC 0.0 0.0  WBC    ABSOLUTE NRBC 0.00 0.00 - 0.01 K/uL    NEUTROPHILS 70 40 - 73 %    LYMPHOCYTES 24 21 - 52 %    MONOCYTES 4 3 - 10 %    EOSINOPHILS 2 0 - 5 %    BASOPHILS 0 0 - 2 %    IMMATURE GRANULOCYTES 0 0 - 0.5 %    ABS. NEUTROPHILS 6.7 1.8 - 8.0 K/UL    ABS. LYMPHOCYTES 2.3 0.9 - 3.6 K/UL    ABS. MONOCYTES 0.4 0.05 - 1.2 K/UL    ABS. EOSINOPHILS 0.2 0.0 - 0.4 K/UL    ABS. BASOPHILS 0.0 0.0 - 0.1 K/UL    ABS. IMM.  GRANS. 0.0 0.00 - 0.04 K/UL    DF AUTOMATED     METABOLIC PANEL, BASIC    Collection Time: 10/06/21 12:40 AM   Result Value Ref Range    Sodium 136 135 - 145 mmol/L    Potassium 4.4 3.2 - 5.1 mmol/L    Chloride 107 94 - 111 mmol/L    CO2 24 21 - 33 mmol/L    Anion gap 5 mmol/L    Glucose 174 (H) 70 - 110 mg/dL    BUN 28 (H) 9 - 21 mg/dL    Creatinine 1.00 0.70 - 1.20 mg/dL    BUN/Creatinine ratio 28      GFR est AA >60 ml/min/1.73m2    GFR est non-AA 56 ml/min/1.73m2    Calcium 8.7 8.5 - 10.5 mg/dL   TROPONIN I    Collection Time: 10/06/21 12:40 AM   Result Value Ref Range    Troponin-I, Qt. 0.03 0.02 - 0.05 ng/mL   BNP    Collection Time: 10/06/21 12:40 AM   Result Value Ref Range    BNP 1,530 (H) 0 - 100 pg/mL   COVID-19 RAPID TEST    Collection Time: 10/06/21 12:47 AM   Result Value Ref Range    Specimen source Nasopharynx      COVID-19 rapid test Not Detected Not Detected     SARS-COV-2    Collection Time: 10/06/21 12:47 AM   Result Value Ref Range    SARS-CoV-2 Please find results under separate order     TROPONIN I    Collection Time: 10/06/21  6:00 AM   Result Value Ref Range    Troponin-I, Qt. 0.03 0.02 - 0.05 ng/mL     Discussed case with Dr. Corin Rainey, and our impression and recommendations are as follows:  1. Acute decompensated heart failure with reduced ejection fraction: Troponins are negative x 2. Initial EKG is nonischemic. BNP is 1530. Chest xray completed in the ER indicates CHF. Agree with Lasix 40 mg IV BID. Will obtain echocardiogram to assess overall cardiac structure and function. Will continue carvedilol. Not currently on ACE/ARB; she is allergic to lisinopril (causes cough), so will start losartan. Recommend daily weights, strict I&Os, and 1500 ml fluid restrictions. Continue telemetry monitoring. Keep serum potassium between 4-5 and serum magnesium > 2.   2. Hypertensive emergency: Improving. Will add losartan. Continue amlodipine and carvedilol. Monitor blood pressures closely. 3. Hyperlipidemia: Continue statin therapy. Thank you for involving us in the care of this patient. Please do not hesitate to call me or Dr. Corin Rainey if additional questions arise.  If assistance is needed after hours or on weekends, please call the answering service at [phone].

## 2021-10-06 NOTE — PROGRESS NOTES
Received pt via stretcher from ED. Pt is A&Ox4. Breath sounds are clear; diminished at the RLL. Bowel sounds positive. PP positive. Pt is currently on room air not requiring O2; no s/s of respiratory distress. 20g IV access to the left [facility]; pt currently has IV Azithromycin infusing. Pt has generalized edema throughout extremities. Pt states that symptoms of SOB, CP and cough began one week ago. SOB and CP have been intermittent and more pronounced upon exertion. Pt states that \"CP is in the upper abdomen at the breast bone\". Pt denies CP at this time.

## 2021-10-06 NOTE — PROGRESS NOTES
Reason for Admission:  Chart reviewed and noted patient presented with C/O SOB and chest pain for the past few days. Dx: Acute Exacerbation of CHF    PMH: Calculus of Kidney, CHF, HTN, Goiter                     RUR Score: 9%                    Plan for utilizing home health:  TBD        PCP: First and Last name:  Julianna Ng NP     Name of Practice:    Are you a current patient: Yes/No:    Approximate date of last visit:    Can you participate in a virtual visit with your PCP:                     Current Advanced Directive/Advance Care Plan: Full Code- No ACP      Healthcare Decision Maker: Oswaldo Alex  Click here to complete [address] including selection of the Healthcare Decision Maker Relationship (ie \"Primary\")                             Transition of Care Plan: TBD    Care Management Interventions  PCP Verified by CM: Yes  Transition of Care Consult (CM Consult):  Discharge Planning  Current Support Network: [facility]- [phone]. Patient states she lives at home alone. She doesn't use any DME and plans to return back home at discharge.

## 2021-10-06 NOTE — ED TRIAGE NOTES
Patient states she started having shortness of breath and chest pain for the past few days that is intermittent.

## 2021-10-06 NOTE — ED PROVIDER NOTES
Pt c/o sob, w one month, worsening, daily chest pain x one month also, but more freq today, worse w exertion. Moderate non prod cough. Hasn't had covid vaccine. No fever. Sob worse w exertion but present at rest. H/o chf and htn.hasnot taken meds for htn today. No weakness. No leg pain. Chronic leg swelling, no change. No fever. On asa daily. Past Medical History:   Diagnosis Date    Calculus of kidney     Congestive heart failure ([facility] [address]     Essential hypertension     Goiter        Past Surgical History:   Procedure Laterality Date    HX  SECTION      HX COLONOSCOPY  2019    HX HEART CATHETERIZATION      HX TUBAL LIGATION           Family History:   Problem Relation Age of Onset    Cancer Mother         breast    Hypertension Mother     Heart Surgery Mother     Thyroid Disease Mother     Thyroid Disease Sister        Social History     Socioeconomic History    Marital status: SINGLE     Spouse name: Not on file    Number of children: Not on file    Years of education: Not on file    Highest education level: Not on file   Occupational History    Not on file   Tobacco Use    Smoking status: Never Smoker    Smokeless tobacco: Never Used   Substance and Sexual Activity    Alcohol use: Not Currently    Drug use: Never    Sexual activity: Not on file   Other Topics Concern    Not on file   Social History Narrative    Not on file     Social Determinants of Health     Financial Resource Strain:     Difficulty of Paying Living Expenses:    Food Insecurity:     Worried About Running Out of Food in the Last Year:     [address] in the Last Year:    Transportation Needs:     Lack of Transportation (Medical):      Lack of Transportation (Non-Medical):    Physical Activity:     Days of Exercise per Week:     Minutes of Exercise per Session:    Stress:     Feeling of Stress :    Social Connections:     Frequency of Communication with Friends and Family:     Frequency of Social Gatherings with Friends and Family:     Attends Jewish Services:     Active Member of Clubs or Organizations:     Attends Club or Organization Meetings:     Marital Status:    Intimate Partner Violence:     Fear of Current or Ex-Partner:     Emotionally Abused:     Physically Abused:     Sexually Abused: ALLERGIES: Lisinopril and Nitroglycerin    Review of Systems   Constitutional: Negative for fever. HENT: Negative for congestion. Respiratory: Positive for cough and shortness of breath. Cardiovascular: Positive for chest pain. Gastrointestinal: Negative for abdominal pain and vomiting. Musculoskeletal: Negative for back pain. Skin: Negative for rash. Neurological: Negative for light-headedness. All other systems reviewed and are negative. Vitals:    10/06/21 0021 10/06/21 0051 10/06/21 0121 10/06/21 0140   BP: (!) 198/135 (!) 178/119 (!) 175/123 (!) 189/110   Pulse: (!) 102 99 94 95   Resp: 28  22 22   Temp: 97.7 °F (36.5 °C)      SpO2: 96%  97% 97%   Weight: 81.6 kg (180 lb)      Height: 5' 6\" (1.676 m)               Physical Exam  Vitals and nursing note reviewed. Constitutional:       Appearance: She is well-developed. She is not diaphoretic. HENT:      Head: Normocephalic and atraumatic. Nose: Nose normal.   Eyes:      Pupils: Pupils are equal, round, and reactive to light. Cardiovascular:      Rate and Rhythm: Normal rate and regular rhythm. Heart sounds: No murmur heard. Pulmonary:      Effort: Pulmonary effort is normal.      Breath sounds: No wheezing. Abdominal:      Palpations: Abdomen is soft. Tenderness: There is no abdominal tenderness. Musculoskeletal:         General: No tenderness. Cervical back: Normal range of motion. Skin:     General: Skin is dry. Capillary Refill: Capillary refill takes less than 2 seconds. Findings: No rash.    Neurological:      Mental Status: She is alert and oriented to person, place, and time. Psychiatric:         Mood and Affect: Mood normal.          MDM       Procedures    Vitals:  Patient Vitals for the past 12 hrs:   Temp Pulse Resp BP SpO2   10/06/21 0140 -- 95 22 (!) 189/110 97 %   10/06/21 0121 -- 94 22 (!) 175/123 97 %   10/06/21 0051 -- 99 -- (!) 178/119 --   10/06/21 0021 97.7 °F (36.5 °C) (!) 102 28 (!) 198/135 96 %         Medications ordered:   Medications   cefTRIAXone (ROCEPHIN) 1 g in 0.9% sodium chloride (MBP/ADV) 50 mL MBP (has no administration in time range)   azithromycin (ZITHROMAX) 500 mg in 0.9% sodium chloride 250 mL (VIAL-MATE) (has no administration in time range)   labetaloL (NORMODYNE;TRANDATE) injection 10 mg (has no administration in time range)   furosemide (LASIX) injection 40 mg (40 mg IntraVENous Given 10/6/21 0055)   carvediloL (COREG) tablet 25 mg (25 mg Oral Given 10/6/21 0051)   amLODIPine (NORVASC) tablet 10 mg (10 mg Oral Given 10/6/21 0051)         Lab findings:  Recent Results (from the past 12 hour(s))   CBC WITH AUTOMATED DIFF    Collection Time: 10/06/21 12:40 AM   Result Value Ref Range    WBC 9.6 4.6 - 13.2 K/uL    RBC 4.71 4.20 - 5.30 M/uL    HGB 12.9 12.0 - 16.0 g/dL    HCT 43.2 35.0 - 45.0 %    MCV 91.7 78.0 - 100.0 FL    MCH 27.4 24.0 - 34.0 PG    MCHC 29.9 (L) 31.0 - 37.0 g/dL    RDW 13.3 11.6 - 14.5 %    PLATELET 311 000 - 527 K/uL    MPV 11.2 9.2 - 11.8 FL    NRBC 0.0 0.0  WBC    ABSOLUTE NRBC 0.00 0.00 - 0.01 K/uL    NEUTROPHILS 70 40 - 73 %    LYMPHOCYTES 24 21 - 52 %    MONOCYTES 4 3 - 10 %    EOSINOPHILS 2 0 - 5 %    BASOPHILS 0 0 - 2 %    IMMATURE GRANULOCYTES 0 0 - 0.5 %    ABS. NEUTROPHILS 6.7 1.8 - 8.0 K/UL    ABS. LYMPHOCYTES 2.3 0.9 - 3.6 K/UL    ABS. MONOCYTES 0.4 0.05 - 1.2 K/UL    ABS. EOSINOPHILS 0.2 0.0 - 0.4 K/UL    ABS. BASOPHILS 0.0 0.0 - 0.1 K/UL    ABS. IMM.  GRANS. 0.0 0.00 - 0.04 K/UL    DF AUTOMATED     METABOLIC PANEL, BASIC    Collection Time: 10/06/21 12:40 AM   Result Value Ref Range Sodium 136 135 - 145 mmol/L    Potassium 4.4 3.2 - 5.1 mmol/L    Chloride 107 94 - 111 mmol/L    CO2 24 21 - 33 mmol/L    Anion gap 5 mmol/L    Glucose 174 (H) 70 - 110 mg/dL    BUN 28 (H) 9 - 21 mg/dL    Creatinine 1.00 0.70 - 1.20 mg/dL    BUN/Creatinine ratio 28      GFR est AA >60 ml/min/1.73m2    GFR est non-AA 56 ml/min/1.73m2    Calcium 8.7 8.5 - 10.5 mg/dL   TROPONIN I    Collection Time: 10/06/21 12:40 AM   Result Value Ref Range    Troponin-I, Qt. 0.03 0.02 - 0.05 ng/mL   BNP    Collection Time: 10/06/21 12:40 AM   Result Value Ref Range    BNP 1,530 (H) 0 - 100 pg/mL   COVID-19 RAPID TEST    Collection Time: 10/06/21 12:47 AM   Result Value Ref Range    Specimen source Nasopharynx      COVID-19 rapid test Not Detected Not Detected             X-Ray, CT or other radiology findings or impressions:  XR CHEST PORT   Final Result   Central pulmonary vascular congestion with minimal right base   atelectasis/airspace disease with possible trace right pleural effusion. Progress notes, Consult notes or additional Procedure notes:   2:14 AM bp 190/119, improved but cont sig elevation. Cont tachypnia, w nl sats. D.w NP Laina Randhawa, to admit      Diagnosis:   1. Acute on chronic congestive heart failure, unspecified heart failure type ([facility] [address]    2. Malignant hypertension        Disposition: admit    Follow-up Information    None          Patient's Medications   Start Taking    No medications on file   Continue Taking    AMLODIPINE (NORVASC) 10 MG TABLET    Take 1 tablet by mouth once daily    ASCORBIC ACID, VITAMIN C, (VITAMIN C) 500 MG TABLET    Take  by mouth. ASPIRIN (ASPIRIN) 325 MG TABLET    Take 325 mg by mouth daily. ATORVASTATIN (LIPITOR) 40 MG TABLET    Take 1 Tablet by mouth daily. CARVEDILOL (COREG) 25 MG TABLET    Take 1 Tab by mouth two (2) times daily (with meals). CYANOCOBALAMIN 1,000 MCG TABLET    Take 1,000 mcg by mouth daily.     ERGOCALCIFEROL (VITAMIN D2) 1,250 MCG (50,000 UNIT) CAPSULE    Take 50,000 Units by mouth. FERROUS SULFATE 325 MG (65 MG IRON) TABLET    TAKE 1 TABLET BY MOUTH ONCE DAILY WITH  VITAMIN  C    FUROSEMIDE (LASIX) 40 MG TABLET    Take 40 mg by mouth daily. KETOROLAC (TORADOL) 10 MG TABLET    Take 1 Tab by mouth every six (6) hours as needed for Pain. LISINOPRIL (PRINIVIL, ZESTRIL) 20 MG TABLET    Take 1 Tab by mouth daily. MONTELUKAST (SINGULAIR) 10 MG TABLET    Take 1 Tablet by mouth daily. ONDANSETRON (ZOFRAN ODT) 8 MG DISINTEGRATING TABLET    Take 1 Tab by mouth every eight (8) hours as needed for Nausea or Vomiting. POTASSIUM CHLORIDE (K-DUR, KLOR-CON) 20 MEQ TABLET    Take 20 mEq by mouth daily. TAMSULOSIN (FLOMAX) 0.4 MG CAPSULE    Take 1 Cap by mouth daily. TRIAMCINOLONE (NASACORT AQ) 55 MCG NASAL INHALER    2 Sprays by Both Nostrils route daily.    These Medications have changed    No medications on file   Stop Taking    No medications on file

## 2021-10-06 NOTE — ASSESSMENT & PLAN NOTE
This is a acute on chronic problem  patient is compliant with blood pressure medicine  No headache reported no neurological symptoms  Patient treated in ED with labetalol  Continue lisinopril, Coreg, Norvasc  Cardiology consult echocardiogram

## 2021-10-07 ENCOUNTER — APPOINTMENT (OUTPATIENT)
Dept: GENERAL RADIOLOGY | Age: 61
DRG: 291 | End: 2021-10-07
Attending: NURSE PRACTITIONER
Payer: COMMERCIAL

## 2021-10-07 LAB
ALBUMIN SERPL-MCNC: 3.3 G/DL (ref 3.5–4.7)
ALBUMIN/GLOB SERPL: 1 {RATIO}
ALP SERPL-CCNC: 61 U/L (ref 38–126)
ALT SERPL-CCNC: 76 U/L (ref 3–52)
ANION GAP SERPL CALC-SCNC: 11 MMOL/L
AST SERPL W P-5'-P-CCNC: 44 U/L (ref 14–74)
BASOPHILS # BLD: 0 K/UL (ref 0–0.1)
BASOPHILS NFR BLD: 0 % (ref 0–2)
BILIRUB SERPL-MCNC: 0.9 MG/DL (ref 0.2–1)
BUN SERPL-MCNC: 22 MG/DL (ref 9–21)
BUN/CREAT SERPL: 24
CA-I BLD-MCNC: 9 MG/DL (ref 8.5–10.5)
CHLORIDE SERPL-SCNC: 99 MMOL/L (ref 94–111)
CO2 SERPL-SCNC: 31 MMOL/L (ref 21–33)
CREAT SERPL-MCNC: 0.9 MG/DL (ref 0.7–1.2)
DIFFERENTIAL METHOD BLD: ABNORMAL
EOSINOPHIL # BLD: 0.3 K/UL (ref 0–0.4)
EOSINOPHIL NFR BLD: 4 % (ref 0–5)
ERYTHROCYTE [DISTWIDTH] IN BLOOD BY AUTOMATED COUNT: 13.4 % (ref 11.6–14.5)
GLOBULIN SER CALC-MCNC: 3.4 G/DL
GLUCOSE SERPL-MCNC: 136 MG/DL (ref 70–110)
HCT VFR BLD AUTO: 44.2 % (ref 35–45)
HGB BLD-MCNC: 12.9 G/DL (ref 12–16)
IMM GRANULOCYTES # BLD AUTO: 0 K/UL (ref 0–0.04)
IMM GRANULOCYTES NFR BLD AUTO: 0 % (ref 0–0.5)
LYMPHOCYTES # BLD: 2.4 K/UL (ref 0.9–3.6)
LYMPHOCYTES NFR BLD: 33 % (ref 21–52)
MAGNESIUM SERPL-MCNC: 1.6 MG/DL (ref 1.7–2.8)
MCH RBC QN AUTO: 26.4 PG (ref 24–34)
MCHC RBC AUTO-ENTMCNC: 29.2 G/DL (ref 31–37)
MCV RBC AUTO: 90.6 FL (ref 78–100)
MONOCYTES # BLD: 0.4 K/UL (ref 0.05–1.2)
MONOCYTES NFR BLD: 6 % (ref 3–10)
NEUTS SEG # BLD: 4.2 K/UL (ref 1.8–8)
NEUTS SEG NFR BLD: 57 % (ref 40–73)
NRBC # BLD: 0 K/UL (ref 0–0.01)
NRBC BLD-RTO: 0 PER 100 WBC
PLATELET # BLD AUTO: 236 K/UL (ref 135–420)
PMV BLD AUTO: 10.8 FL (ref 9.2–11.8)
POTASSIUM SERPL-SCNC: 3.9 MMOL/L (ref 3.2–5.1)
PROT SERPL-MCNC: 6.7 G/DL (ref 6.1–8.4)
RBC # BLD AUTO: 4.88 M/UL (ref 4.2–5.3)
SODIUM SERPL-SCNC: 141 MMOL/L (ref 135–145)
WBC # BLD AUTO: 7.3 K/UL (ref 4.6–13.2)

## 2021-10-07 PROCEDURE — 85025 COMPLETE CBC W/AUTO DIFF WBC: CPT

## 2021-10-07 PROCEDURE — 74011000258 HC RX REV CODE- 258: Performed by: INTERNAL MEDICINE

## 2021-10-07 PROCEDURE — 36415 COLL VENOUS BLD VENIPUNCTURE: CPT

## 2021-10-07 PROCEDURE — 65270000029 HC RM PRIVATE

## 2021-10-07 PROCEDURE — 80053 COMPREHEN METABOLIC PANEL: CPT

## 2021-10-07 PROCEDURE — 83735 ASSAY OF MAGNESIUM: CPT

## 2021-10-07 PROCEDURE — 74011250637 HC RX REV CODE- 250/637: Performed by: NURSE PRACTITIONER

## 2021-10-07 PROCEDURE — 74011250636 HC RX REV CODE- 250/636: Performed by: NURSE PRACTITIONER

## 2021-10-07 PROCEDURE — 74011250636 HC RX REV CODE- 250/636: Performed by: INTERNAL MEDICINE

## 2021-10-07 PROCEDURE — 71045 X-RAY EXAM CHEST 1 VIEW: CPT

## 2021-10-07 RX ORDER — LANOLIN ALCOHOL/MO/W.PET/CERES
400 CREAM (GRAM) TOPICAL 2 TIMES DAILY
Status: DISCONTINUED | OUTPATIENT
Start: 2021-10-07 | End: 2021-10-08 | Stop reason: HOSPADM

## 2021-10-07 RX ORDER — LOSARTAN POTASSIUM 25 MG/1
100 TABLET ORAL DAILY
Status: DISCONTINUED | OUTPATIENT
Start: 2021-10-08 | End: 2021-10-08 | Stop reason: HOSPADM

## 2021-10-07 RX ORDER — ASPIRIN 81 MG/1
81 TABLET ORAL DAILY
Status: DISCONTINUED | OUTPATIENT
Start: 2021-10-08 | End: 2021-10-08 | Stop reason: HOSPADM

## 2021-10-07 RX ADMIN — CARVEDILOL 25 MG: 12.5 TABLET, FILM COATED ORAL at 09:12

## 2021-10-07 RX ADMIN — LOSARTAN POTASSIUM 50 MG: 25 TABLET, FILM COATED ORAL at 09:11

## 2021-10-07 RX ADMIN — MONTELUKAST 10 MG: 10 TABLET, FILM COATED ORAL at 09:12

## 2021-10-07 RX ADMIN — POTASSIUM CHLORIDE 20 MEQ: 10 TABLET, EXTENDED RELEASE ORAL at 09:11

## 2021-10-07 RX ADMIN — Medication 10 ML: at 05:15

## 2021-10-07 RX ADMIN — Medication 10 ML: at 23:12

## 2021-10-07 RX ADMIN — FERROUS SULFATE TAB 325 MG (65 MG ELEMENTAL FE) 325 MG: 325 (65 FE) TAB at 09:12

## 2021-10-07 RX ADMIN — OXYCODONE HYDROCHLORIDE AND ACETAMINOPHEN 500 MG: 500 TABLET ORAL at 09:11

## 2021-10-07 RX ADMIN — ASPIRIN 325 MG ORAL TABLET 325 MG: 325 PILL ORAL at 09:12

## 2021-10-07 RX ADMIN — FUROSEMIDE 40 MG: 10 INJECTION, SOLUTION INTRAMUSCULAR; INTRAVENOUS at 09:12

## 2021-10-07 RX ADMIN — MAGNESIUM OXIDE 400 MG (241.3 MG MAGNESIUM) TABLET 400 MG: TABLET at 17:30

## 2021-10-07 RX ADMIN — AMLODIPINE BESYLATE 10 MG: 5 TABLET ORAL at 09:11

## 2021-10-07 RX ADMIN — AZITHROMYCIN MONOHYDRATE 500 MG: 500 INJECTION, POWDER, LYOPHILIZED, FOR SOLUTION INTRAVENOUS at 03:19

## 2021-10-07 RX ADMIN — ENOXAPARIN SODIUM 40 MG: 40 INJECTION SUBCUTANEOUS at 09:12

## 2021-10-07 RX ADMIN — FLUTICASONE PROPIONATE 2 SPRAY: 50 SPRAY, METERED NASAL at 09:15

## 2021-10-07 RX ADMIN — ATORVASTATIN CALCIUM 40 MG: 40 TABLET, FILM COATED ORAL at 09:12

## 2021-10-07 RX ADMIN — CYANOCOBALAMIN TAB 500 MCG 1000 MCG: 500 TAB at 09:11

## 2021-10-07 RX ADMIN — CEFTRIAXONE 1 G: 1 INJECTION, POWDER, FOR SOLUTION INTRAMUSCULAR; INTRAVENOUS at 02:34

## 2021-10-07 RX ADMIN — FUROSEMIDE 40 MG: 10 INJECTION, SOLUTION INTRAMUSCULAR; INTRAVENOUS at 17:30

## 2021-10-07 RX ADMIN — CARVEDILOL 25 MG: 12.5 TABLET, FILM COATED ORAL at 17:30

## 2021-10-07 RX ADMIN — Medication 10 ML: at 13:49

## 2021-10-07 NOTE — PROGRESS NOTES
Hospitalist Progress Note             Date of Service:  10/7/2021  NAME:  Dion Lindquist  :  1960  MRN:  [id number]    Assessment & Plan:         Acute on chronic systolic chf  This is an acute exacerbation of chronic CHF  Lasix 40 mg twice daily IV  Low-salt diet cardiac  Continue potassium  Continue Lipitor  Cardiology consult- discusse with same  Echocardiogram last 2020 ejection fraction 35 to 40%        Hypertensive emergency  This is a acute on chronic problem  patient is compliant with blood pressure medicine  No headache reported no neurological symptoms  Patient treated in ED with labetalol  Continue lisinopril, Coreg, Norvasc  Cardiology consult echocardiogram     HLD  - cont statin            Code Status: Full     Prophylaxis:  Lovenox 40 mg subcu daily         Hospital Problems  Date Reviewed: 9/10/2020        Codes Class Noted POA    * (Principal) Acute exacerbation of CHF (congestive heart failure) ([facility] [address] ICD-10-CM: I50.9  ICD-9-CM: 428.0  10/6/2021 Yes        Hypertensive emergency ICD-10-CM: I16.1  ICD-9-CM: 401.9  10/6/2021 Yes                Review of Systems:   A comprehensive review of systems was negative except for that written in the HPI. Feels much better, not requiring 02, no cp,       Vital Signs:    Last 24hrs VS reviewed since prior progress note. Most recent are:  Visit Vitals  BP (!) 145/97   Pulse 63   Temp 97.2 °F (36.2 °C)   Resp 16   Ht 5' 6\" (1.676 m)   Wt 88 kg (194 lb)   SpO2 95%   BMI 31.31 kg/m²         Intake/Output Summary (Last 24 hours) at 10/7/2021 1413  Last data filed at 10/7/2021 0604  Gross per 24 hour   Intake 1510 ml   Output --   Net 1510 ml        Physical Examination:             General:          Alert, cooperative, no distress, appears stated age.      HEENT:           Atraumatic, anicteric sclerae, pink conjunctivae                          No oral ulcers, mucosa moist, throat clear, dentition fair  Neck:               Supple, symmetrical  Lungs:             Clear to auscultation bilaterally. No Wheezing or Rhonchi. No rales. Chest wall:      No tenderness  No Accessory muscle use. Heart:              Regular  rhythm,  No  murmur   No edema  Abdomen:        Soft, non-tender. Not distended. Bowel sounds normal  Extremities:     No cyanosis. No clubbing,                            Skin turgor normal, Capillary refill normal  Skin:                Not pale. Not Jaundiced  No rashes   Psych:             Not anxious or agitated. Neurologic:      Alert, moves all extremities, answers questions appropriately and responds to commands        Data Review:    Review and/or order of clinical lab test  Review and/or order of tests in the radiology section of CPT  Review and/or order of tests in the medicine section of CPT      Labs:     Recent Labs     10/07/21  0521 10/06/21  0040   WBC 7.3 9.6   HGB 12.9 12.9   HCT 44.2 43.2    247     Recent Labs     10/07/21  0521 10/06/21  0040    136   K 3.9 4.4   CL 99 107   CO2 31 24   BUN 22* 28*   CREA 0.90 1.00   * 174*   CA 9.0 8.7   MG 1.6*  --      Recent Labs     10/07/21  0521   ALT 76*   AP 61   TBILI 0.9   TP 6.7   ALB 3.3*   GLOB 3.4     No results for input(s): INR, PTP, APTT, INREXT in the last 72 hours. No results for input(s): FE, TIBC, PSAT, FERR in the last 72 hours. No results found for: FOL, RBCF   No results for input(s): PH, PCO2, PO2 in the last 72 hours.   Recent Labs     10/06/21  1731 10/06/21  1210 10/06/21  0600   TROIQ 0.02 0.02 0.03     Lab Results   Component Value Date/Time    Cholesterol, total 150 10/01/2021 12:24 PM    HDL Cholesterol 49 10/01/2021 12:24 PM    LDL, calculated 88 10/01/2021 12:24 PM    Triglyceride 65 10/01/2021 12:24 PM    CHOL/HDL Ratio 3.1 10/01/2021 12:24 PM     No results found for: GLUCPOC  No results found for: COLOR, APPRN, SPGRU, [address], MARIA ESTHER, PROTU, GLUCU, KETU, BILU, UROU, CANDY, LEUKU, GLUKE, EPSU, BACTU, WBCU, RBCU, CASTS, UCRY      Medications Reviewed:     Current Facility-Administered Medications   Medication Dose Route Frequency    magnesium oxide (MAG-OX) tablet 400 mg  400 mg Oral BID    [START ON 10/8/2021] aspirin delayed-release tablet 81 mg  81 mg Oral DAILY    sodium chloride (NS) flush 5-40 mL  5-40 mL IntraVENous Q8H    sodium chloride (NS) flush 5-40 mL  5-40 mL IntraVENous PRN    acetaminophen (TYLENOL) tablet 650 mg  650 mg Oral Q6H PRN    Or    acetaminophen (TYLENOL) suppository 650 mg  650 mg Rectal Q6H PRN    polyethylene glycol (MIRALAX) packet 17 g  17 g Oral DAILY PRN    ondansetron (ZOFRAN ODT) tablet 4 mg  4 mg Oral Q8H PRN    Or    ondansetron (ZOFRAN) injection 4 mg  4 mg IntraVENous Q6H PRN    enoxaparin (LOVENOX) injection 40 mg  40 mg SubCUTAneous DAILY    cefTRIAXone (ROCEPHIN) 1 g in 0.9% sodium chloride (MBP/ADV) 50 mL MBP  1 g IntraVENous Q24H    azithromycin (ZITHROMAX) 500 mg in 0.9% sodium chloride 250 mL (VIAL-MATE)  500 mg IntraVENous Q24H    hydrALAZINE (APRESOLINE) 20 mg/mL injection 20 mg  20 mg IntraVENous Q6H PRN    amLODIPine (NORVASC) tablet 10 mg  10 mg Oral DAILY    ascorbic acid (vitamin C) (VITAMIN C) tablet 500 mg  500 mg Oral DAILY    atorvastatin (LIPITOR) tablet 40 mg  40 mg Oral DAILY    carvediloL (COREG) tablet 25 mg  25 mg Oral BID WITH MEALS    cyanocobalamin (VITAMIN B12) tablet 1,000 mcg  1,000 mcg Oral DAILY    ferrous sulfate tablet 325 mg  1 Tablet Oral DAILY    montelukast (SINGULAIR) tablet 10 mg  10 mg Oral DAILY    potassium chloride (KLOR-CON) tablet 20 mEq  20 mEq Oral DAILY    fluticasone propionate (FLONASE) 50 mcg/actuation nasal spray 2 Spray  2 Spray Both Nostrils DAILY    furosemide (LASIX) injection 40 mg  40 mg IntraVENous BID    losartan (COZAAR) tablet 50 mg  50 mg Oral DAILY ______________________________________________________________________  EXPECTED LENGTH OF STAY: 3d 2h  ACTUAL LENGTH OF STAY:          1                 Donna Ortega MD

## 2021-10-07 NOTE — CONSULTS
[address] [facility]    Consult Report    NAME:  Sera Nj  SEX:   female  ADMIT: 10/6/2021  DATE OF CONSULT:2021  REFERRING PHYSICIAN:  Dr Danielle De Souza  : 1960  MR#    [id number]  ROOM: [facility]/[address] [place] [address]#  [de-identified]    CONSULTING PHYSICIAN:  Derrell Harrell MD    REASON FOR CONSULTATION:  Shortness of breath    HISTORY OF PRESENT ILLNESS:  Sera Nj is a 63 y.o. female  has a past medical history of Calculus of kidney, Congestive heart failure ([address] Essential hypertension, and Goiter. Patient seen at bedside in the emergency department. Patient presented to the emergency room tonight having shortness of breath and chest pain for the past few days. No chest pain or shortness of breath is reported to me at this time patient states it has resolved. .  Patient is allergic to nitro so did not get nitro in the emergency room. Patient takes an aspirin a day states she took her aspirin today. Patient was treated for hypertensive emergency with labetalol patient responded well to the labetalol. No headache no neurological symptoms are reported. She was treated for possible infiltrate in the ER when reviewing chest x-ray no infiltrate noted + pulmonary congestion consistent with congestive heart failure. Antibiotics not be continued at this time. Patient had her last echo in 2020 with a 35 to 40% EF.     Patient will be admitted to the hospitalist group cardiology consult echocardiogram cycle troponins a.m. labs. Lasix will be continued 20 mg twice a day IV hypertension meds will be continued and hydralazine as needed for high blood pressure. REVIEW OF SYSTEMS:  Constitutional: Negative for fever. Respiratory: Positive for shortness of breath. Cardiovascular: Positive for chest pain and leg swelling. Negative for orthopnea. Gastrointestinal: Negative for nausea and vomiting. Musculoskeletal: Negative for neck pain. Neurological: Negative for dizziness. PAST MEDICAL HISTORY:  Past Medical History:   Diagnosis Date    Calculus of kidney     Congestive heart failure (HCC)     Essential hypertension     Goiter    . PAST SURGICAL HISTORY:  Past Surgical History:   Procedure Laterality Date    HX  SECTION      HX COLONOSCOPY  2019    HX HEART CATHETERIZATION      HX TUBAL LIGATION         SOCIAL HISTORY:  Social History     Socioeconomic History    Marital status: SINGLE     Spouse name: Not on file    Number of children: Not on file    Years of education: Not on file    Highest education level: Not on file   Occupational History    Not on file   Tobacco Use    Smoking status: Never Smoker    Smokeless tobacco: Never Used   Substance and Sexual Activity    Alcohol use: Not Currently    Drug use: Never    Sexual activity: Not on file   Other Topics Concern    Not on file   Social History Narrative    Not on file     Social Determinants of Health     Financial Resource Strain:     Difficulty of Paying Living Expenses:    Food Insecurity:     Worried About Running Out of Food in the Last Year:     [address] in the Last Year:    Transportation Needs:     Lack of Transportation (Medical):      Lack of Transportation (Non-Medical):    Physical Activity:     Days of Exercise per Week:     Minutes of Exercise per Session:    Stress:     Feeling of Stress :    Social Connections:     Frequency of Communication with Friends and Family:     Frequency of Social Gatherings with Friends and Family:     Attends Caoda[employer] Services:     Active Member of Clubs or Organizations:     Attends Club or Organization Meetings:     Marital Status:    Intimate Partner Violence:     Fear of Current or Ex-Partner:     Emotionally Abused:     Physically Abused:     Sexually Abused:        FAMILY HISTORY:  Family History   Problem Relation Age of Onset    Cancer Mother         breast    Hypertension Mother     Heart Surgery Mother  Thyroid Disease Mother     Thyroid Disease Sister        ALLERGIES:  Allergies   Allergen Reactions    Lisinopril Cough     Patient is still taking medication     Nitroglycerin Other (comments)     Headaches with patch only. Can take sublingual without any difficulty       MEDICATIONS:  Prior to Admission Medications   Prescriptions Last Dose Informant Patient Reported? Taking? amLODIPine (NORVASC) 10 mg tablet 10/5/2021 at Unknown time  No Yes   Sig: Take 1 tablet by mouth once daily   ascorbic acid, vitamin C, (Vitamin C) 500 mg tablet 10/5/2021 at Unknown time  Yes Yes   Sig: Take  by mouth. aspirin (ASPIRIN) 325 mg tablet 10/6/2021 at Unknown time  Yes Yes   Sig: Take 325 mg by mouth daily. atorvastatin (LIPITOR) 40 mg tablet 10/5/2021 at Unknown time  No Yes   Sig: Take 1 Tablet by mouth daily. carvediloL (COREG) 25 mg tablet 10/6/2021 at Unknown time  No Yes   Sig: Take 1 Tab by mouth two (2) times daily (with meals). cyanocobalamin 1,000 mcg tablet 10/5/2021 at Unknown time  Yes Yes   Sig: Take 1,000 mcg by mouth daily. ferrous sulfate 325 mg (65 mg iron) tablet 10/5/2021 at Unknown time  No Yes   Sig: TAKE 1 TABLET BY MOUTH ONCE DAILY WITH  VITAMIN  C   furosemide (Lasix) 40 mg tablet 10/5/2021 at Unknown time  Yes Yes   Sig: Take 40 mg by mouth daily. ketorolac (TORADOL) 10 mg tablet 10/5/2021 at Unknown time  No Yes   Sig: Take 1 Tab by mouth every six (6) hours as needed for Pain.   montelukast (Singulair) 10 mg tablet 10/5/2021 at Unknown time  No Yes   Sig: Take 1 Tablet by mouth daily. ondansetron (ZOFRAN ODT) 8 mg disintegrating tablet 10/5/2021 at Unknown time  No Yes   Sig: Take 1 Tab by mouth every eight (8) hours as needed for Nausea or Vomiting. potassium chloride (K-DUR, KLOR-CON) 20 mEq tablet 10/5/2021 at Unknown time  Yes Yes   Sig: Take 20 mEq by mouth daily.    triamcinolone (NASACORT AQ) 55 mcg nasal inhaler 10/5/2021 at Unknown time  Yes Yes   Si Sprays by Both Nostrils route daily.       Facility-Administered Medications: None     Current Facility-Administered Medications   Medication Dose Route Frequency    sodium chloride (NS) flush 5-40 mL  5-40 mL IntraVENous Q8H    sodium chloride (NS) flush 5-40 mL  5-40 mL IntraVENous PRN    acetaminophen (TYLENOL) tablet 650 mg  650 mg Oral Q6H PRN    Or    acetaminophen (TYLENOL) suppository 650 mg  650 mg Rectal Q6H PRN    polyethylene glycol (MIRALAX) packet 17 g  17 g Oral DAILY PRN    ondansetron (ZOFRAN ODT) tablet 4 mg  4 mg Oral Q8H PRN    Or    ondansetron (ZOFRAN) injection 4 mg  4 mg IntraVENous Q6H PRN    enoxaparin (LOVENOX) injection 40 mg  40 mg SubCUTAneous DAILY    cefTRIAXone (ROCEPHIN) 1 g in 0.9% sodium chloride (MBP/ADV) 50 mL MBP  1 g IntraVENous Q24H    azithromycin (ZITHROMAX) 500 mg in 0.9% sodium chloride 250 mL (VIAL-MATE)  500 mg IntraVENous Q24H    hydrALAZINE (APRESOLINE) 20 mg/mL injection 20 mg  20 mg IntraVENous Q6H PRN    amLODIPine (NORVASC) tablet 10 mg  10 mg Oral DAILY    ascorbic acid (vitamin C) (VITAMIN C) tablet 500 mg  500 mg Oral DAILY    aspirin tablet 325 mg  325 mg Oral DAILY    atorvastatin (LIPITOR) tablet 40 mg  40 mg Oral DAILY    carvediloL (COREG) tablet 25 mg  25 mg Oral BID WITH MEALS    cyanocobalamin (VITAMIN B12) tablet 1,000 mcg  1,000 mcg Oral DAILY    ferrous sulfate tablet 325 mg  1 Tablet Oral DAILY    montelukast (SINGULAIR) tablet 10 mg  10 mg Oral DAILY    potassium chloride (KLOR-CON) tablet 20 mEq  20 mEq Oral DAILY    fluticasone propionate (FLONASE) 50 mcg/actuation nasal spray 2 Spray  2 Spray Both Nostrils DAILY    furosemide (LASIX) injection 40 mg  40 mg IntraVENous BID    losartan (COZAAR) tablet 50 mg  50 mg Oral DAILY       PHYSICAL EXAMINATION:  Patient Vitals for the past 24 hrs:   Temp Pulse Resp BP SpO2   10/07/21 0530 96.9 °F (36.1 °C) 74 16 (!) 156/96 98 %   10/06/21 2000 -- 76 -- -- --   10/06/21 1925 97.6 °F (36.4 °C) 75 16 [place] ) 142/98 99 %   10/06/21 1701 98.2 °F (36.8 °C) 69 18 131/86 97 %   10/06/21 1600 -- 67 -- -- --   10/06/21 1442 -- -- -- (!) 151/101 --   10/06/21 1200 97 °F (36.1 °C) 66 18 (!) 151/101 95 %     Ill-appearing awake  HEENT: NCAT, PERRLA  Neck: supple  Chest: clear  CVS:RRR  Abd: soft, NT, ND, BS++  Ext: 2+ bilateral edema  Neuro: non focal exam      LABORATORY DATA:  Labs: Results:   ABG   No results found for: PH, PHI, PCO2, PCO2I, PO2, PO2I, HCO3, HCO3I, FIO2, FIO2I   Chemistry Recent Labs     10/07/21  0521 10/06/21  0040   * 174*    136   K 3.9 4.4   CL 99 107   CO2 31 24   BUN 22* 28*   CREA 0.90 1.00   CA 9.0 8.7   AGAP 11 5   BUCR 24 28   AP 61  --    TP 6.7  --    ALB 3.3*  --    GLOB 3.4  --    AGRAT 1.0  --     Estimated Creatinine Clearance: 73.4 mL/min (based on SCr of 0.9 mg/dL). CBC w/Diff Recent Labs     10/07/21  0521 10/06/21  0040   WBC 7.3 9.6   RBC 4.88 4.71   HGB 12.9 12.9   HCT 44.2 43.2    247   GRANS 57 70   LYMPH 33 24   EOS 4 2      BTNP Lab Results   Component Value Date/Time    BNP 1,530 (H) 10/06/2021 12:40 AM      Cardiac Enzymes No results for input(s): CPK, CKND1, RENE in the last 72 hours. No lab exists for component: CKRMB, TROIP   Coagulation No results for input(s): PTP, INR, APTT, INREXT in the last 72 hours. Liver Enzymes Recent Labs     10/07/21  0521   TP 6.7   ALB 3.3*   AP 61   ALT 76*      Thyroid Studies No results for input(s): T4, T3U, TSH, TSHEXT in the last 72 hours. No lab exists for component: T3RU     UA No results for input(s): UGLU in the last 72 hours. No lab exists for component: SOURCEUR, UBILIRUBIN, UKET, USPG, UOCB, UPH, UPSC, UUROBILISQ, UNITR, URINELEUKOC   Blood cultures No results found for: CULT, CULT1, CULT2, GMS    No results for input(s): CULT, CULT1, CULT2, GMS in the last 72 hours.     No results found for: CULT, CULT1, CULT2, GMS   Cultures All Micro Results     Procedure Component Value Units Date/Time SARS-COV-2 BY DANETTE [[id number]] Collected: 10/06/21 0047    Order Status: Completed Specimen: Nasopharyngeal Updated: 10/06/21 1527     SARS-CoV-2, DANETTE Not detected     Comment: This test has been authorized by the FDA under an Emergency Use  Authorization (EUA) for use by authorized laboratories. Testing performed by nucleic acid amplification method. CULTURE, BLOOD [[id number]] Collected: 10/06/21 0310    Order Status: Completed Specimen: Blood Updated: 10/06/21 0320    CULTURE, BLOOD [[id number]] Collected: 10/06/21 0040    Order Status: Completed Specimen: Blood Updated: 10/06/21 0252    COVID-19 RAPID TEST [[id number]] Collected: 10/06/21 0047    Order Status: Completed Specimen: Nasopharyngeal Updated: 10/06/21 0205     Specimen source Nasopharynx        COVID-19 rapid test Not Detected        Comment:   Rapid NAAT:  The specimen is NEGATIVE for SARS-CoV-2, the novel coronavirus associated with COVID-19. Negative results should be treated as presumptive and, if inconsistent with clinical signs and symptoms or necessary for patient management, should be tested with an alternative molecular assay. Negative results do not preclude SARS-CoV-2 infection and should not be used as the sole basis for patient management decisions. This test has been authorized by the FDA under an Emergency Use   Authorization (EUA) for use by authorized laboratories. Fact sheet for Healthcare Providers: ConventionUpdate.co.nz Fact sheet for Patients: ConventionUpdate.co.nz   Methodology: Isothermal Nucleic Acid Amplification                All Micro Results     Procedure Component Value Units Date/Time    SARS-COV-2 BY DANETTE [[id number]] Collected: 10/06/21 0047    Order Status: Completed Specimen: Nasopharyngeal Updated: 10/06/21 1527     SARS-CoV-2, DANETTE Not detected     Comment:  This test has been authorized by the FDA under an Emergency Use  Authorization (EUA) for use by authorized laboratories. Testing performed by nucleic acid amplification method. CULTURE, BLOOD [[id number]] Collected: 10/06/21 0310    Order Status: Completed Specimen: Blood Updated: 10/06/21 0320    CULTURE, BLOOD [[id number]] Collected: 10/06/21 0040    Order Status: Completed Specimen: Blood Updated: 10/06/21 0252    COVID-19 RAPID TEST [[id number]] Collected: 10/06/21 0047    Order Status: Completed Specimen: Nasopharyngeal Updated: 10/06/21 0205     Specimen source Nasopharynx        COVID-19 rapid test Not Detected        Comment:   Rapid NAAT:  The specimen is NEGATIVE for SARS-CoV-2, the novel coronavirus associated with COVID-19. Negative results should be treated as presumptive and, if inconsistent with clinical signs and symptoms or necessary for patient management, should be tested with an alternative molecular assay. Negative results do not preclude SARS-CoV-2 infection and should not be used as the sole basis for patient management decisions. This test has been authorized by the FDA under an Emergency Use   Authorization (EUA) for use by authorized laboratories. Fact sheet for Healthcare Providers: ConventionUpdate.co.nz Fact sheet for Patients: ConventionUpdate.co.nz   Methodology: Isothermal Nucleic Acid Amplification              XR CHEST PORT    Result Date: 10/7/2021  EXAM: PORTABLE CHEST HISTORY: Pneumonia. COMPARISON: 10/6/2021 TECHNIQUE: Single portable view. _______________ FINDINGS: SUPPORT DEVICES: None HEART AND MEDIASTINUM: Cardiomegaly. Normal pulmonary vasculature. LUNGS AND PLEURAL SPACES: The lungs are clear. BONES AND SOFT TISSUES: Bony structures are normal. _______________     No evidence of active pulmonary process. Cardiomegaly without change. ECHO ADULT FOLLOW-UP OR LIMITED    Result Date: 10/6/2021  · LV: Calculated LVEF is 39%. Biplane method used to measure ejection fraction. Normal wall thickness. Moderately dilated left ventricle. Moderate-to-severely reduced systolic function. · LA: Severely dilated left atrium. Left Atrium volume index is 72.8 mL/m2. · RA: Mildly dilated right atrium. · TV: Moderate tricuspid valve regurgitation is present. Right Ventricular Arterial Pressure (RVSP) is 41 mmHg. Pulmonary hypertension found to be mild. · PA: Mild pulmonary hypertension. Pulmonary arterial systolic pressure is 41 mmHg. · MV: Mitral annular dilatation. Moderate to severe mitral valve regurgitation is present.           ASSESSMENT:  -Acute hypoxemic respiratory failure  -Acute exacerbation of chronic congestive heart failure (low EF)  -Hypertensive urgency    PLAN:  -I have reviewed the patient's entire presentation, labs, radiological studies  -She presented with increased shortness of breath and was noted to be significantly hypertensive in the emergency room requiring a dose of labetalol  -I believe most of her current issues are stemming from pulmonary vascular congestion/congestive heart failure  -She is received Lasix and has diuresed overnight and already feeling a little bit better  -Most recent echo does show an EF of 39%  -Appreciate cardiology help  -Agree with strict I's and O's monitoring, low-salt diet, electrolyte replacement  -She is back on her p.o. medications for her blood pressure    Thank you very much for consulting me on this patient    Clara Shah MD  October 7, 2021  8:22 AM

## 2021-10-07 NOTE — PROGRESS NOTES
CARDIOLOGY PROGRESS NOTE - NP    Patient seen and examined. This is a patient who is followed for acute heart failure with reduced ejection fraction. She reports progressively worsening shortness of breath prior to admission, including on exertion and orthopnea. She was having abdominal bloating and leg edema. Since admission she now reports improvement in her breathing, but still short of breath with exertion. No other complaints reported. Telemetry reviewed, there were no events noted in the past 24 hours. She is in sinus rhythm with PACs and PVCs. Pertinent review of systems items noted above, all other systems are negative. Current medications reviewed. PHYSICAL EXAMINATION:  Vital sign assessment reveal a blood pressure of 155/105 and pulse rate of 97. Cardiovascular exam has a heart with a regular rate and rhythm, normal S1/S2. Soft systolic murmur noted. Respiratory exam reveals clear breath sounds bilaterally; no rales or wheezing. No supplemental oxygen use. Trace to 1+ lower extremity edema noted. Neurologic exam is nonfocal.     Recent labs results and imaging reviewed.   Notable findings include:   Recent Results (from the past 24 hour(s))   ECHO ADULT FOLLOW-UP OR LIMITED    Collection Time: 10/06/21  3:41 PM   Result Value Ref Range    LV ED Vol A2C 125.00 cm3    LV ED Vol A4C 108.00 cm3    LV ED Vol A2C 238.00 cm3    LV ES Vol A4C 66.50 cm3    LV ES Vol A2C 125.00 cm3    IVSd 1.00 (A) 0.60 - 0.90 cm    LVIDd 6.20 (A) 3.90 - 5.30 cm    LVIDs 5.00 cm    LVOT d 2.20 cm    LVPWd 1.20 (A) 0.60 - 0.90 cm    Left Atrium Major Axis 7.72 cm    LA Area 2C 31.60 cm2    LA Area 4C 37.80 cm2    LA Volume DISK .00 22.0 - 52.0 cm3    Left Atrium Major Axis 4.70 cm    Left Atrium to Aortic Root Ratio 1.47     Right Atrial Area 4C 22.70 cm2    Right Atrium Major Axis 6.52 cm    Est. RA Pressure 3.00 mmHg    AV Cusp 1.70 cm    Ao Root D 3.20 cm    MV Mean Gradient 3.00 mmHg    Mitral Valve Annulus Velocity Time Integral 25.00 cm    Mitral Valve Max Velocity 131.00 cm/s    MV Peak Gradient 7.00 mmHg    TR Max Velocity 307.00 cm/s    TV MG 38.00 mmHg    RVSP 41.00 mmHg    BP EF 39.2 55.0 - 100.0 %    LV Mass .4 67.0 - 162.0 g    LV Mass AL Index 154.7 43.0 - 95.0 g/m2   TROPONIN I    Collection Time: 10/06/21  5:31 PM   Result Value Ref Range    Troponin-I, Qt. 0.02 0.02 - 9.28 ng/mL   METABOLIC PANEL, COMPREHENSIVE    Collection Time: 10/07/21  5:21 AM   Result Value Ref Range    Sodium 141 135 - 145 mmol/L    Potassium 3.9 3.2 - 5.1 mmol/L    Chloride 99 94 - 111 mmol/L    CO2 31 21 - 33 mmol/L    Anion gap 11 mmol/L    Glucose 136 (H) 70 - 110 mg/dL    BUN 22 (H) 9 - 21 mg/dL    Creatinine 0.90 0.70 - 1.20 mg/dL    BUN/Creatinine ratio 24      GFR est AA >60 ml/min/1.73m2    GFR est non-AA >60 ml/min/1.73m2    Calcium 9.0 8.5 - 10.5 mg/dL    Bilirubin, total 0.9 0.2 - 1.0 mg/dL    AST (SGOT) 44 14 - 74 U/L    ALT (SGPT) 76 (H) 3 - 52 U/L    Alk. phosphatase 61 38 - 126 U/L    Protein, total 6.7 6.1 - 8.4 g/dL    Albumin 3.3 (L) 3.5 - 4.7 g/dL    Globulin 3.4 g/dL    A-G Ratio 1.0     MAGNESIUM    Collection Time: 10/07/21  5:21 AM   Result Value Ref Range    Magnesium 1.6 (L) 1.7 - 2.8 mg/dL   CBC WITH AUTOMATED DIFF    Collection Time: 10/07/21  5:21 AM   Result Value Ref Range    WBC 7.3 4.6 - 13.2 K/uL    RBC 4.88 4.20 - 5.30 M/uL    HGB 12.9 12.0 - 16.0 g/dL    HCT 44.2 35.0 - 45.0 %    MCV 90.6 78.0 - 100.0 FL    MCH 26.4 24.0 - 34.0 PG    MCHC 29.2 (L) 31.0 - 37.0 g/dL    RDW 13.4 11.6 - 14.5 %    PLATELET 198 913 - 893 K/uL    MPV 10.8 9.2 - 11.8 FL    NRBC 0.0 0.0  WBC    ABSOLUTE NRBC 0.00 0.00 - 0.01 K/uL    NEUTROPHILS 57 40 - 73 %    LYMPHOCYTES 33 21 - 52 %    MONOCYTES 6 3 - 10 %    EOSINOPHILS 4 0 - 5 %    BASOPHILS 0 0 - 2 %    IMMATURE GRANULOCYTES 0 0 - 0.5 %    ABS. NEUTROPHILS 4.2 1.8 - 8.0 K/UL    ABS. LYMPHOCYTES 2.4 0.9 - 3.6 K/UL    ABS.  MONOCYTES 0.4 0.05 - 1.2 K/UL ABS. EOSINOPHILS 0.3 0.0 - 0.4 K/UL    ABS. BASOPHILS 0.0 0.0 - 0.1 K/UL    ABS. IMM. GRANS. 0.0 0.00 - 0.04 K/UL    DF AUTOMATED       Discussed case with Dr. Fredo Monroe, and our impression and recommendations are as follows:  1. Acute decompensated heart failure with reduced ejection fraction: Troponins are negative x 4. EKG is nonischemic. Symptoms are improving. Chest xray completed today shows resolution of acute heart failure. Continue Lasix 40 mg IV BID. Echocardiogram completed this admission indicates EF of 39% with moderately dilated LV, moderate-severe reduction in LV systolic function, moderate-severe MR, moderate TR, and mild pulmonary HTN. Will continue carvedilol. Continue losartan; increase to 100 mg daily. Plan to transition to [facility] as outpatient, as this is nonformulary in this facility. Continue daily weights, strict I&Os, and 1500 ml fluid restrictions. Continue telemetry monitoring. Keep serum potassium between 4-5 and serum magnesium > 2.   2. Hypertensive emergency: Improving. Continue losartan but increase to 100 mg daily. Continue amlodipine and carvedilol. Monitor blood pressures closely. 3. Hyperlipidemia: Continue statin therapy. Thank you for involving us in the care of this patient. Please do not hesitate to call me or Dr. Fredo Monroe if additional questions arise. If assistance is needed after hours or on weekends, please call the answering service at [phone].

## 2021-10-07 NOTE — PROGRESS NOTES
Comprehensive Nutrition Assessment    Type and Reason for Visit: Initial    Nutrition Recommendations/Plan: cardiac restricted diet and 1500 mL FR    Nutrition Assessment:  62 yo female PMH: Calculus of kidney, CHF, HTN, goiter   morbidly obese female BMI 31.3 hx of HTN and CHF came in with SOB and chest pain 2/2 CHF and HTN urgency. Pt reports compliance with medications but does endorse that she is has not been compliant with diet by choice. Pt did accept dietary education today. Reports she was eating out a lot with high Na foods. Encouraged to eat more at home to control Na intake. Provided handouts via nutrition care manual for both HTN and CHF nutrition therapy pt accepted both handout and reports she will read them while she is here and ask questions if she has any.     Recent Results (from the past 24 hour(s))   ECHO ADULT FOLLOW-UP OR LIMITED    Collection Time: 10/06/21  3:41 PM   Result Value Ref Range    LV ED Vol A2C 125.00 cm3    LV ED Vol A4C 108.00 cm3    LV ED Vol A2C 238.00 cm3    LV ES Vol A4C 66.50 cm3    LV ES Vol A2C 125.00 cm3    IVSd 1.00 (A) 0.60 - 0.90 cm    LVIDd 6.20 (A) 3.90 - 5.30 cm    LVIDs 5.00 cm    LVOT d 2.20 cm    LVPWd 1.20 (A) 0.60 - 0.90 cm    Left Atrium Major Axis 7.72 cm    LA Area 2C 31.60 cm2    LA Area 4C 37.80 cm2    LA Volume DISK .00 22.0 - 52.0 cm3    Left Atrium Major Axis 4.70 cm    Left Atrium to Aortic Root Ratio 1.47     Right Atrial Area 4C 22.70 cm2    Right Atrium Major Axis 6.52 cm    Est. RA Pressure 3.00 mmHg    AV Cusp 1.70 cm    Ao Root D 3.20 cm    MV Mean Gradient 3.00 mmHg    Mitral Valve Annulus Velocity Time Integral 25.00 cm    Mitral Valve Max Velocity 131.00 cm/s    MV Peak Gradient 7.00 mmHg    TR Max Velocity 307.00 cm/s    TV MG 38.00 mmHg    RVSP 41.00 mmHg    BP EF 39.2 55.0 - 100.0 %    LV Mass .4 67.0 - 162.0 g    LV Mass AL Index 154.7 43.0 - 95.0 g/m2   TROPONIN I    Collection Time: 10/06/21  5:31 PM   Result Value Ref Range Troponin-I, Qt. 0.02 0.02 - 6.68 ng/mL   METABOLIC PANEL, COMPREHENSIVE    Collection Time: 10/07/21  5:21 AM   Result Value Ref Range    Sodium 141 135 - 145 mmol/L    Potassium 3.9 3.2 - 5.1 mmol/L    Chloride 99 94 - 111 mmol/L    CO2 31 21 - 33 mmol/L    Anion gap 11 mmol/L    Glucose 136 (H) 70 - 110 mg/dL    BUN 22 (H) 9 - 21 mg/dL    Creatinine 0.90 0.70 - 1.20 mg/dL    BUN/Creatinine ratio 24      GFR est AA >60 ml/min/1.73m2    GFR est non-AA >60 ml/min/1.73m2    Calcium 9.0 8.5 - 10.5 mg/dL    Bilirubin, total 0.9 0.2 - 1.0 mg/dL    AST (SGOT) 44 14 - 74 U/L    ALT (SGPT) 76 (H) 3 - 52 U/L    Alk. phosphatase 61 38 - 126 U/L    Protein, total 6.7 6.1 - 8.4 g/dL    Albumin 3.3 (L) 3.5 - 4.7 g/dL    Globulin 3.4 g/dL    A-G Ratio 1.0     MAGNESIUM    Collection Time: 10/07/21  5:21 AM   Result Value Ref Range    Magnesium 1.6 (L) 1.7 - 2.8 mg/dL   CBC WITH AUTOMATED DIFF    Collection Time: 10/07/21  5:21 AM   Result Value Ref Range    WBC 7.3 4.6 - 13.2 K/uL    RBC 4.88 4.20 - 5.30 M/uL    HGB 12.9 12.0 - 16.0 g/dL    HCT 44.2 35.0 - 45.0 %    MCV 90.6 78.0 - 100.0 FL    MCH 26.4 24.0 - 34.0 PG    MCHC 29.2 (L) 31.0 - 37.0 g/dL    RDW 13.4 11.6 - 14.5 %    PLATELET 990 036 - 168 K/uL    MPV 10.8 9.2 - 11.8 FL    NRBC 0.0 0.0  WBC    ABSOLUTE NRBC 0.00 0.00 - 0.01 K/uL    NEUTROPHILS 57 40 - 73 %    LYMPHOCYTES 33 21 - 52 %    MONOCYTES 6 3 - 10 %    EOSINOPHILS 4 0 - 5 %    BASOPHILS 0 0 - 2 %    IMMATURE GRANULOCYTES 0 0 - 0.5 %    ABS. NEUTROPHILS 4.2 1.8 - 8.0 K/UL    ABS. LYMPHOCYTES 2.4 0.9 - 3.6 K/UL    ABS. MONOCYTES 0.4 0.05 - 1.2 K/UL    ABS. EOSINOPHILS 0.3 0.0 - 0.4 K/UL    ABS. BASOPHILS 0.0 0.0 - 0.1 K/UL    ABS. IMM.  GRANS. 0.0 0.00 - 0.04 K/UL    DF AUTOMATED         Malnutrition Assessment:  Malnutrition Status:  No malnutrition    Context:  Acute illness     Findings of the 6 clinical characteristics of malnutrition:   Energy Intake:  No significant decrease in energy intake  Weight Loss:  No significant weight loss     Body Fat Loss:  No significant body fat loss,     Muscle Mass Loss:  No significant muscle mass loss,    Fluid Accumulation:  No significant fluid accumulation,     Strength:  Normal  strength         Estimated Daily Nutrient Needs:  Energy (kcal): 3235-6770 kcal/day; Weight Used for Energy Requirements: Admission (88 kg)  Protein (g): 70-88 g/day; Weight Used for Protein Requirements: Admission (0.8-1 g/kg)  Fluid (ml/day): 5028-5150 mL/day; Method Used for Fluid Requirements: 1 ml/kcal      Nutrition Related Findings:  morbidly obese female BMI 31.3 hx of HTN and CHF came in with SOB and chest pain 2/2 CHF and HTN urgency. Pt reports compliance with medications but does endorse that she is has not been compliant with diet by choice      Wounds:    None       Current Nutrition Therapies:  ADULT DIET Regular; Low Fat/Low Chol/High Fiber/NABEEL; 1500 ml    Anthropometric Measures:  · Height:  5' 6\" (167.6 cm)  · Current Body Wt:  88 kg (194 lb)   · Admission Body Wt:  194 lb    · Usual Body Wt:        · Ideal Body Wt:  130 lbs:  149.2 %   · Adjusted Body Weight:   ; Weight Adjustment for: No adjustment   · Adjusted BMI:       · BMI Category:  Obese class 1 (BMI 30.0-34. 9)       Nutrition Diagnosis:   · Limited adherence to nutrition-related recommendations related to cardiac dysfunction as evidenced by other (specify) (diet hx)      Nutrition Interventions:   Food and/or Nutrient Delivery: Continue current diet  Nutrition Education and Counseling: Education initiated, Education completed  Coordination of Nutrition Care: Continue to monitor while inpatient    Goals:  Pt to eat > 75% of meals, BM q 1-3 days, BMI 18.5-24.9, compliance with diet order       Nutrition Monitoring and Evaluation:   Behavioral-Environmental Outcomes:    Food/Nutrient Intake Outcomes: Food and nutrient intake  Physical Signs/Symptoms Outcomes: Meal time behavior, Weight     F/U: 10/11/2021    Discharge Planning:    Continue current diet     Electronically signed by Addison Hwang on 10/7/2021 at 3:36 PM    Contact: [facility] [phone]

## 2021-10-08 VITALS
BODY MASS INDEX: 31.69 KG/M2 | TEMPERATURE: 97.7 F | DIASTOLIC BLOOD PRESSURE: 99 MMHG | SYSTOLIC BLOOD PRESSURE: 141 MMHG | HEIGHT: 66 IN | RESPIRATION RATE: 18 BRPM | WEIGHT: 197.2 LBS | OXYGEN SATURATION: 98 % | HEART RATE: 72 BPM

## 2021-10-08 LAB
ANION GAP SERPL CALC-SCNC: 11 MMOL/L
BASOPHILS # BLD: 0 K/UL (ref 0–0.1)
BASOPHILS NFR BLD: 1 % (ref 0–2)
BUN SERPL-MCNC: 25 MG/DL (ref 9–21)
BUN/CREAT SERPL: 31
CA-I BLD-MCNC: 9 MG/DL (ref 8.5–10.5)
CHLORIDE SERPL-SCNC: 101 MMOL/L (ref 94–111)
CO2 SERPL-SCNC: 30 MMOL/L (ref 21–33)
CREAT SERPL-MCNC: 0.8 MG/DL (ref 0.7–1.2)
DIFFERENTIAL METHOD BLD: ABNORMAL
EOSINOPHIL # BLD: 0.3 K/UL (ref 0–0.4)
EOSINOPHIL NFR BLD: 4 % (ref 0–5)
ERYTHROCYTE [DISTWIDTH] IN BLOOD BY AUTOMATED COUNT: 13.5 % (ref 11.6–14.5)
GLUCOSE SERPL-MCNC: 104 MG/DL (ref 70–110)
HCT VFR BLD AUTO: 44.4 % (ref 35–45)
HGB BLD-MCNC: 13.2 G/DL (ref 12–16)
IMM GRANULOCYTES # BLD AUTO: 0 K/UL (ref 0–0.04)
IMM GRANULOCYTES NFR BLD AUTO: 0 % (ref 0–0.5)
LYMPHOCYTES # BLD: 2.3 K/UL (ref 0.9–3.6)
LYMPHOCYTES NFR BLD: 33 % (ref 21–52)
MAGNESIUM SERPL-MCNC: 1.8 MG/DL (ref 1.7–2.8)
MCH RBC QN AUTO: 27 PG (ref 24–34)
MCHC RBC AUTO-ENTMCNC: 29.7 G/DL (ref 31–37)
MCV RBC AUTO: 90.8 FL (ref 78–100)
MONOCYTES # BLD: 0.5 K/UL (ref 0.05–1.2)
MONOCYTES NFR BLD: 7 % (ref 3–10)
NEUTS SEG # BLD: 3.9 K/UL (ref 1.8–8)
NEUTS SEG NFR BLD: 55 % (ref 40–73)
NRBC # BLD: 0 K/UL (ref 0–0.01)
NRBC BLD-RTO: 0 PER 100 WBC
PHOSPHATE SERPL-MCNC: 4.4 MG/DL (ref 2.5–4.5)
PLATELET # BLD AUTO: 251 K/UL (ref 135–420)
PMV BLD AUTO: 11 FL (ref 9.2–11.8)
POTASSIUM SERPL-SCNC: 3.7 MMOL/L (ref 3.2–5.1)
RBC # BLD AUTO: 4.89 M/UL (ref 4.2–5.3)
SODIUM SERPL-SCNC: 142 MMOL/L (ref 135–145)
WBC # BLD AUTO: 7 K/UL (ref 4.6–13.2)

## 2021-10-08 PROCEDURE — 36415 COLL VENOUS BLD VENIPUNCTURE: CPT

## 2021-10-08 PROCEDURE — 80048 BASIC METABOLIC PNL TOTAL CA: CPT

## 2021-10-08 PROCEDURE — 74011000258 HC RX REV CODE- 258: Performed by: INTERNAL MEDICINE

## 2021-10-08 PROCEDURE — 85025 COMPLETE CBC W/AUTO DIFF WBC: CPT

## 2021-10-08 PROCEDURE — 83735 ASSAY OF MAGNESIUM: CPT

## 2021-10-08 PROCEDURE — 74011250637 HC RX REV CODE- 250/637: Performed by: NURSE PRACTITIONER

## 2021-10-08 PROCEDURE — 74011250636 HC RX REV CODE- 250/636: Performed by: INTERNAL MEDICINE

## 2021-10-08 PROCEDURE — 94618 PULMONARY STRESS TESTING: CPT

## 2021-10-08 PROCEDURE — 90471 IMMUNIZATION ADMIN: CPT

## 2021-10-08 PROCEDURE — 90686 IIV4 VACC NO PRSV 0.5 ML IM: CPT | Performed by: INTERNAL MEDICINE

## 2021-10-08 PROCEDURE — 84100 ASSAY OF PHOSPHORUS: CPT

## 2021-10-08 RX ORDER — LOSARTAN POTASSIUM 100 MG/1
100 TABLET ORAL DAILY
Qty: 30 TABLET | Refills: 0 | Status: SHIPPED | OUTPATIENT
Start: 2021-10-08 | End: 2021-12-08 | Stop reason: SDUPTHER

## 2021-10-08 RX ADMIN — AZITHROMYCIN MONOHYDRATE 500 MG: 500 INJECTION, POWDER, LYOPHILIZED, FOR SOLUTION INTRAVENOUS at 03:51

## 2021-10-08 RX ADMIN — ASPIRIN 81 MG: 81 TABLET, COATED ORAL at 09:35

## 2021-10-08 RX ADMIN — FUROSEMIDE 40 MG: 10 INJECTION, SOLUTION INTRAMUSCULAR; INTRAVENOUS at 09:35

## 2021-10-08 RX ADMIN — ATORVASTATIN CALCIUM 40 MG: 40 TABLET, FILM COATED ORAL at 09:34

## 2021-10-08 RX ADMIN — LOSARTAN POTASSIUM 100 MG: 25 TABLET, FILM COATED ORAL at 09:35

## 2021-10-08 RX ADMIN — CARVEDILOL 25 MG: 12.5 TABLET, FILM COATED ORAL at 09:34

## 2021-10-08 RX ADMIN — MAGNESIUM OXIDE 400 MG (241.3 MG MAGNESIUM) TABLET 400 MG: TABLET at 09:34

## 2021-10-08 RX ADMIN — CYANOCOBALAMIN TAB 500 MCG 1000 MCG: 500 TAB at 09:34

## 2021-10-08 RX ADMIN — AMLODIPINE BESYLATE 10 MG: 5 TABLET ORAL at 09:35

## 2021-10-08 RX ADMIN — FERROUS SULFATE TAB 325 MG (65 MG ELEMENTAL FE) 325 MG: 325 (65 FE) TAB at 09:34

## 2021-10-08 RX ADMIN — OXYCODONE HYDROCHLORIDE AND ACETAMINOPHEN 500 MG: 500 TABLET ORAL at 09:35

## 2021-10-08 RX ADMIN — FLUTICASONE PROPIONATE 2 SPRAY: 50 SPRAY, METERED NASAL at 09:41

## 2021-10-08 RX ADMIN — MONTELUKAST 10 MG: 10 TABLET, FILM COATED ORAL at 09:35

## 2021-10-08 RX ADMIN — INFLUENZA VIRUS VACCINE 0.5 ML: 15; 15; 15; 15 SUSPENSION INTRAMUSCULAR at 11:42

## 2021-10-08 RX ADMIN — Medication 10 ML: at 05:06

## 2021-10-08 RX ADMIN — POTASSIUM CHLORIDE 20 MEQ: 10 TABLET, EXTENDED RELEASE ORAL at 09:34

## 2021-10-08 RX ADMIN — CEFTRIAXONE 1 G: 1 INJECTION, POWDER, FOR SOLUTION INTRAMUSCULAR; INTRAVENOUS at 02:36

## 2021-10-08 NOTE — ROUTINE PROCESS
10/8/2021      RE: Spero Phlegm      To Whom it May Concern: This is to certify that Spero Phlegm has been under our care since 10/6/2021 to 10/9/2021. Please feel free to contact my office if you have any questions or concerns. Thank you for your assistance in this matter.     Sincerely,      Shwetha Francis RN

## 2021-10-08 NOTE — PROGRESS NOTES
10/08/21 1039   Resting (Room Air)   SpO2 98   HR 88   During Walk (Room Air)   SpO2 97   HR 99   Rate of Dyspnea 1   Comments   (pt stated this was the fastest she's walked w/o SOB)   After Walk   SpO2 99   HR 90   Rate of Dyspnea 0   Does the Patient Qualify for Home O2 No   Does the Patient Need Portable Oxygen Tanks No

## 2021-10-08 NOTE — PROGRESS NOTES
CARDIOLOGY PROGRESS NOTE - NP    Patient seen and examined. This is a patient who is followed for acute heart failure with reduced ejection fraction. She reports improvement in shortness of breath. No chest pain or leg swelling. No other complaints reported. Telemetry reviewed, there were no events noted in the past 24 hours. She is in sinus rhythm with PACs and PVCs. Pertinent review of systems items noted above, all other systems are negative. Current medications reviewed. PHYSICAL EXAMINATION:  Vital sign assessment reveal a blood pressure of 141/99 and pulse rate of 77. Cardiovascular exam has a heart with a regular rate and rhythm, normal S1/S2. Soft systolic murmur noted. Respiratory exam reveals clear breath sounds bilaterally; no rales or wheezing. No supplemental oxygen use. Trace tlower extremity edema noted. Neurologic exam is nonfocal.     Recent labs results and imaging reviewed. Notable findings include:   Recent Results (from the past 24 hour(s))   CBC WITH AUTOMATED DIFF    Collection Time: 10/08/21  5:00 AM   Result Value Ref Range    WBC 7.0 4.6 - 13.2 K/uL    RBC 4.89 4.20 - 5.30 M/uL    HGB 13.2 12.0 - 16.0 g/dL    HCT 44.4 35.0 - 45.0 %    MCV 90.8 78.0 - 100.0 FL    MCH 27.0 24.0 - 34.0 PG    MCHC 29.7 (L) 31.0 - 37.0 g/dL    RDW 13.5 11.6 - 14.5 %    PLATELET 336 189 - 833 K/uL    MPV 11.0 9.2 - 11.8 FL    NRBC 0.0 0.0  WBC    ABSOLUTE NRBC 0.00 0.00 - 0.01 K/uL    NEUTROPHILS 55 40 - 73 %    LYMPHOCYTES 33 21 - 52 %    MONOCYTES 7 3 - 10 %    EOSINOPHILS 4 0 - 5 %    BASOPHILS 1 0 - 2 %    IMMATURE GRANULOCYTES 0 0 - 0.5 %    ABS. NEUTROPHILS 3.9 1.8 - 8.0 K/UL    ABS. LYMPHOCYTES 2.3 0.9 - 3.6 K/UL    ABS. MONOCYTES 0.5 0.05 - 1.2 K/UL    ABS. EOSINOPHILS 0.3 0.0 - 0.4 K/UL    ABS. BASOPHILS 0.0 0.0 - 0.1 K/UL    ABS. IMM.  GRANS. 0.0 0.00 - 0.04 K/UL    DF AUTOMATED     MAGNESIUM    Collection Time: 10/08/21  5:00 AM   Result Value Ref Range    Magnesium 1.8 1.7 - 2.8 mg/dL   METABOLIC PANEL, BASIC    Collection Time: 10/08/21  5:00 AM   Result Value Ref Range    Sodium 142 135 - 145 mmol/L    Potassium 3.7 3.2 - 5.1 mmol/L    Chloride 101 94 - 111 mmol/L    CO2 30 21 - 33 mmol/L    Anion gap 11 mmol/L    Glucose 104 70 - 110 mg/dL    BUN 25 (H) 9 - 21 mg/dL    Creatinine 0.80 0.70 - 1.20 mg/dL    BUN/Creatinine ratio 31      GFR est AA >60 ml/min/1.73m2    GFR est non-AA >60 ml/min/1.73m2    Calcium 9.0 8.5 - 10.5 mg/dL   PHOSPHORUS    Collection Time: 10/08/21  5:00 AM   Result Value Ref Range    Phosphorus 4.4 2.5 - 4.5 mg/dL     Discussed case with Dr. Genie Rees, and our impression and recommendations are as follows:  1. Acute decompensated heart failure with reduced ejection fraction: Symptoms are improving. Discharge pending today. Recommend Lasix 40 mg po BID at home. Echocardiogram completed this admission indicates EF of 39% with moderately dilated LV, moderate-severe reduction in LV systolic function, moderate-severe MR, moderate TR, and mild pulmonary HTN. Will continue carvedilol. Continue losartan 100 mg daily. Plan to transition to [facility] as outpatient, as this is nonformulary in this facility. Continue daily weights, sodium restrictions, and 1500 ml fluid restrictions. Keep serum potassium between 4-5 and serum magnesium > 2. Recommend 2 week follow up at [facility] Cardiology upon discharge. 2. Hypertensive emergency: Improving. Continue losartan, amlodipine, and carvedilol. Monitor blood pressures closely. 3. Hyperlipidemia: Continue statin therapy. Thank you for involving us in the care of this patient. Please do not hesitate to call me or Dr. Genie Rees if additional questions arise. If assistance is needed after hours or on weekends, please call the answering service at [phone].

## 2021-10-08 NOTE — PROGRESS NOTES
Patient assessed, antibiotics given, patient rested quietly in bed, no pain or distress noted. CBWR. No further needs at this time.

## 2021-10-08 NOTE — DISCHARGE SUMMARY
Discharge Summary       PATIENT ID: Lorin Wallace  MRN: [id number]   YOB: 1960    DATE OF ADMISSION: 10/6/2021 12:10 AM    DATE OF DISCHARGE: 10/08/21    PRIMARY CARE PROVIDER: Yin Robison NP     ATTENDING PHYSICIAN: Xiomara Major MD  DISCHARGING PROVIDER: Xiomara Major MD        CONSULTATIONS: IP CONSULT TO CARDIOLOGY  IP CONSULT TO PULMONOLOGY    PROCEDURES/SURGERIES: * No surgery found *    ADMITTING [address] [place] [address] COURSE:   Lorin Wallace is a 63 y.o. female  has a past medical history of Calculus of kidney, Congestive heart failure ([address] Essential hypertension, and Goiter. Patient seen at bedside in the emergency department. Patient presented to the emergency room tonight having shortness of breath and chest pain for the past few days. No chest pain or shortness of breath is reported to me at this time patient states it has resolved. .  Patient is allergic to nitro so did not get nitro in the emergency room. Patient takes an aspirin a day states she took her aspirin today. Patient was treated for hypertensive emergency with labetalol patient responded well to the labetalol. No headache no neurological symptoms are reported. She was treated for possible infiltrate in the ER when reviewing chest x-ray no infiltrate noted + pulmonary congestion consistent with congestive heart failure. Antibiotics not be continued at this time. Patient had her last echo in July 2020 with a 35 to 40% EF.     Patient will be admitted to the hospitalist group cardiology consult echocardiogram cycle troponins a.m. labs.   Lasix will be continued 20 mg twice a day IV hypertension meds will be continued and hydralazine as needed for high blood pressure.           DISCHARGE DIAGNOSES / PLAN:      Assessment & Plan:         Acute on chronic systolic chf  This is an acute exacerbation of chronic CHF  Lasix 40 mg twice daily IV  Low-salt diet cardiac  Continue potassium  Continue Lipitor  Cardiology consult- discusse with same  Echocardiogram last 1 July 2020 ejection fraction 35 to 40%        Hypertensive emergency  This is a acute on chronic problem  patient is compliant with blood pressure medicine  No headache reported no neurological symptoms  Patient treated in ED with labetalol  Continue lisinopril, Coreg, Norvasc  Cardiology consult echocardiogram     HLD  - cont statin           We have diuresed her just over a liter, she feels better, she did great on 6 min walk, no 02 needs. After discussion, she has not been compliant with lasix due ot having to pee all day, we changed to 40mg lasix daily with hopes of better compliance. FOLLOW UP APPOINTMENTS:    Follow-up Information     Follow up With Specialties Details Why Contact Info    Ally Antunez NP Nurse Practitioner, Nurse Practitioner   [id number]  [address], [address], NP Nurse Practitioner Go on 10/29/2021 @ 9:15am  [address]  [phone]               DIET: Cardiac Diet        DISCHARGE MEDICATIONS:  Discharge Medication List as of 10/8/2021 11:46 AM      START taking these medications    Details   losartan (COZAAR) 100 mg tablet Take 1 Tablet by mouth daily. Indications: chronic heart failure, Normal, Disp-30 Tablet, R-0         CONTINUE these medications which have NOT CHANGED    Details   atorvastatin (LIPITOR) 40 mg tablet Take 1 Tablet by mouth daily. , Normal, Disp-42 Tablet, R-0Enough tabs to last until appt day. No additional tablets or refills will be approved. montelukast (Singulair) 10 mg tablet Take 1 Tablet by mouth daily. , Normal, Disp-42 Tablet, R-0Enough tabs to last until appt day. No additional tablets or refills will be approved.       amLODIPine (NORVASC) 10 mg tablet Take 1 tablet by mouth once daily, Normal, Disp-30 Tablet, R-6      ferrous sulfate 325 mg (65 mg iron) tablet TAKE 1 TABLET BY MOUTH ONCE DAILY WITH  VITAMIN  C, Normal, Disp-30 Tab, R-0      ondansetron (ZOFRAN ODT) 8 mg disintegrating tablet Take 1 Tab by mouth every eight (8) hours as needed for Nausea or Vomiting., Normal, Disp-20 Tab,R-0      carvediloL (COREG) 25 mg tablet Take 1 Tab by mouth two (2) times daily (with meals). , Normal, Disp-60 Tab,R-5      aspirin (ASPIRIN) 325 mg tablet Take 325 mg by mouth daily. , Historical Med      ascorbic acid, vitamin C, (Vitamin C) 500 mg tablet Take  by mouth., Historical Med      furosemide (Lasix) 40 mg tablet Take 40 mg by mouth daily. , Historical Med      cyanocobalamin 1,000 mcg tablet Take 1,000 mcg by mouth daily. , Historical Med      potassium chloride (K-DUR, KLOR-CON) 20 mEq tablet Take 20 mEq by mouth daily. , Historical Med      triamcinolone (NASACORT AQ) 55 mcg nasal inhaler 2 Sprays by Both Nostrils route daily. , Historical Med         STOP taking these medications       ketorolac (TORADOL) 10 mg tablet Comments:   Reason for Stopping:                 NOTIFY YOUR PHYSICIAN FOR ANY OF THE FOLLOWING:   Fever over 101 degrees for 24 hours. Chest pain, shortness of breath, fever, chills, nausea, vomiting, diarrhea, change in mentation, falling, weakness, bleeding. Severe pain or pain not relieved by medications. Or, any other signs or symptoms that you may have questions about. PHYSICAL EXAMINATION AT DISCHARGE:  General:          Alert, cooperative, no distress, appears stated age. HEENT:           Atraumatic, anicteric sclerae, pink conjunctivae                          No oral ulcers, mucosa moist, throat clear, dentition fair  Neck:               Supple, symmetrical  Lungs:             Clear to auscultation bilaterally. No Wheezing or Rhonchi. No rales. Chest wall:      No tenderness  No Accessory muscle use. Heart:              Regular  rhythm,  No  murmur   No edema  Abdomen:        Soft, non-tender. Not distended. Bowel sounds normal  Extremities:     No cyanosis.   No clubbing,                            Skin turgor normal, Capillary refill normal  Skin:                Not pale. Not Jaundiced  No rashes   Psych:             Not anxious or agitated.   Neurologic:      Alert, moves all extremities, answers questions appropriately and responds to commands       [address]:  Problem List as of 10/8/2021 Date Reviewed: 9/10/2020        Codes Class Noted - Resolved    Calculus of distal right ureter ICD-10-CM: N20.1  ICD-9-CM: 592.1  9/3/2020 - Present        * (Principal) Acute exacerbation of CHF (congestive heart failure) ([facility]) ICD-10-CM: I50.9  ICD-9-CM: 428.0  10/6/2021 - Present        Hypertensive emergency ICD-10-CM: I16.1  ICD-9-CM: 401.9  10/6/2021 - Present        25 mm Staghorn Calculus lower pole Right Kidney ICD-10-CM: N20.0  ICD-9-CM: 592.0  9/1/2020 - Present        Right flank pain ICD-10-CM: R10.9  ICD-9-CM: 789.09  9/1/2020 - Present        Cardiomyopathy ([facility]) ICD-10-CM: I42.9  ICD-9-CM: 425.4  8/24/2020 - Present        Essential hypertension ICD-10-CM: I10  ICD-9-CM: 401.9  6/1/2020 - Present        Congestive heart failure ([facility]) ICD-10-CM: I50.9  ICD-9-CM: 428.0  6/1/2020 - Present        Dyslipidemia ICD-10-CM: E78.5  ICD-9-CM: 272.4  6/1/2020 - Present        RESOLVED: CAP (community acquired pneumonia) ICD-10-CM: J18.9  ICD-9-CM: 541  10/6/2021 - 10/6/2021              Greater than 35 minutes were spent with the patient on counseling and coordination of care    Signed:   Sam Petersen MD  10/8/2021  1:51 PM

## 2021-10-08 NOTE — PROGRESS NOTES
[address] [facility]       Progress Note     Name: Dion Lindquist   : 1960   MRN: [id number]   Date: 10/8/2021    [x]I have reviewed the flowsheet and previous days notes. Events, vitals, medications and notes from last 24 hours reviewed. ASSESSMENT:   -Acute hypoxemic respiratory failure  -Acute exacerbation of chronic congestive heart failure (low EF)  -Hypertensive urgency   PLAN:   -She presented with increased shortness of breath and was noted to be significantly hypertensive in the emergency room requiring a dose of labetalol  -I believe most of her current issues are stemming from pulmonary vascular congestion/congestive heart failure  - Rapid covid negative PCR pending  -She is received Lasix and has diuresed overnight and already feeling a little bit better  -Most recent echo does show an EF of 39%  -Appreciate cardiology help  -Agree with strict I's and O's monitoring, low-salt diet, electrolyte replacement  -She is back on her p.o. medications for her blood pressure                 SUBJECTIVE:    ROS:   Otherwise negative except for as mentioned in subjective. Allergy:  Allergies   Allergen Reactions    Lisinopril Cough     Patient is still taking medication     Nitroglycerin Other (comments)     Headaches with patch only.  Can take sublingual without any difficulty        Vital Signs:    Visit Vitals  BP (!) 141/99 (BP 1 Location: Left upper arm)   Pulse 77   Temp 97.7 °F (36.5 °C)   Resp 18   Ht 5' 6\" (1.676 m)   Wt 89.4 kg (197 lb 3.2 oz)   SpO2 98%   BMI 31.83 kg/m²       O2 Device: None (Room air)       Temp (24hrs), Av.5 °F (36.4 °C), Min:97.1 °F (36.2 °C), Max:98.2 °F (36.8 °C)       Patient Vitals for the past 8 hrs:   Temp Pulse Resp BP SpO2   10/08/21 0736 97.7 °F (36.5 °C) 77 18 (!) 141/99 98 %   10/08/21 0445 97.6 °F (36.4 °C) 79 18 129/89 95 %   10/08/21 0400 -- 71 -- -- --   10/08/21 0000 -- 66 -- -- --         Intake/Output:   Last shift:      10/08 0701 - 10/08 1900  In: -   Out: 350 [Urine:350]  Last 3 shifts: 10/06 1901 - 10/08 0700  In: 5064 [P.O.:820;  I.V.:250]  Out: 950 [Urine:950]    Intake/Output Summary (Last 24 hours) at 10/8/2021 0753  Last data filed at 10/8/2021 0716  Gross per 24 hour   Intake 360 ml   Output 1300 ml   Net -940 ml          Physical Exam:  HEENT: NCAT, PERRLA  Neck: supple  Chest: clear  CVS:RRR  Abd: soft, NT, ND, BS++  Ext: no edema  Neuro: non focal exam      DATA:     Current Facility-Administered Medications   Medication Dose Route Frequency    magnesium oxide (MAG-OX) tablet 400 mg  400 mg Oral BID    aspirin delayed-release tablet 81 mg  81 mg Oral DAILY    losartan (COZAAR) tablet 100 mg  100 mg Oral DAILY    sodium chloride (NS) flush 5-40 mL  5-40 mL IntraVENous Q8H    sodium chloride (NS) flush 5-40 mL  5-40 mL IntraVENous PRN    acetaminophen (TYLENOL) tablet 650 mg  650 mg Oral Q6H PRN    Or    acetaminophen (TYLENOL) suppository 650 mg  650 mg Rectal Q6H PRN    polyethylene glycol (MIRALAX) packet 17 g  17 g Oral DAILY PRN    ondansetron (ZOFRAN ODT) tablet 4 mg  4 mg Oral Q8H PRN    Or    ondansetron (ZOFRAN) injection 4 mg  4 mg IntraVENous Q6H PRN    enoxaparin (LOVENOX) injection 40 mg  40 mg SubCUTAneous DAILY    cefTRIAXone (ROCEPHIN) 1 g in 0.9% sodium chloride (MBP/ADV) 50 mL MBP  1 g IntraVENous Q24H    azithromycin (ZITHROMAX) 500 mg in 0.9% sodium chloride 250 mL (VIAL-MATE)  500 mg IntraVENous Q24H    hydrALAZINE (APRESOLINE) 20 mg/mL injection 20 mg  20 mg IntraVENous Q6H PRN    amLODIPine (NORVASC) tablet 10 mg  10 mg Oral DAILY    ascorbic acid (vitamin C) (VITAMIN C) tablet 500 mg  500 mg Oral DAILY    atorvastatin (LIPITOR) tablet 40 mg  40 mg Oral DAILY    carvediloL (COREG) tablet 25 mg  25 mg Oral BID WITH MEALS    cyanocobalamin (VITAMIN B12) tablet 1,000 mcg  1,000 mcg Oral DAILY    ferrous sulfate tablet 325 mg  1 Tablet Oral DAILY    montelukast (SINGULAIR) tablet 10 mg  10 mg Oral DAILY    potassium chloride (KLOR-CON) tablet 20 mEq  20 mEq Oral DAILY    fluticasone propionate (FLONASE) 50 mcg/actuation nasal spray 2 Spray  2 Spray Both Nostrils DAILY    furosemide (LASIX) injection 40 mg  40 mg IntraVENous BID                 Labs: Results:   Chemistry Recent Labs     10/08/21  0500 10/07/21  0521 10/06/21  0040    136* 174*    141 136   K 3.7 3.9 4.4    99 107   CO2 30 31 24   BUN 25* 22* 28*   CREA 0.80 0.90 1.00   CA 9.0 9.0 8.7   AGAP 11 11 5   BUCR 31 24 28   AP  --  61  --    TP  --  6.7  --    ALB  --  3.3*  --    GLOB  --  3.4  --    AGRAT  --  1.0  --       CBC w/Diff Recent Labs     10/08/21  0500 10/07/21  0521 10/06/21  0040   WBC 7.0 7.3 9.6   RBC 4.89 4.88 4.71   HGB 13.2 12.9 12.9   HCT 44.4 44.2 43.2    236 247   GRANS 55 57 70   LYMPH 33 33 24   EOS 4 4 2        All Micro Results     Procedure Component Value Units Date/Time    CULTURE, BLOOD [[id number]] Collected: 10/06/21 0040    Order Status: Completed Specimen: Blood Updated: 10/08/21 0724     Special Requests: No Special Requests        Culture result: No growth 2 days       CULTURE, BLOOD [[id number]] Collected: 10/06/21 0310    Order Status: Completed Specimen: Blood Updated: 10/08/21 0724     Special Requests: No Special Requests        Culture result: No growth 2 days       SARS-COV-2 BY DANETTE [[id number]] Collected: 10/06/21 0047    Order Status: Completed Specimen: Nasopharyngeal Updated: 10/06/21 1527     SARS-CoV-2, DANETTE Not detected     Comment: This test has been authorized by the FDA under an Emergency Use  Authorization (EUA) for use by authorized laboratories. Testing performed by nucleic acid amplification method.          COVID-19 RAPID TEST [[id number]] Collected: 10/06/21 0047    Order Status: Completed Specimen: Nasopharyngeal Updated: 10/06/21 0205     Specimen source Nasopharynx        COVID-19 rapid test Not Detected        Comment:   Rapid NAAT:  The specimen is NEGATIVE for SARS-CoV-2, the novel coronavirus associated with COVID-19. Negative results should be treated as presumptive and, if inconsistent with clinical signs and symptoms or necessary for patient management, should be tested with an alternative molecular assay. Negative results do not preclude SARS-CoV-2 infection and should not be used as the sole basis for patient management decisions. This test has been authorized by the FDA under an Emergency Use   Authorization (EUA) for use by authorized laboratories. Fact sheet for Healthcare Providers: ConventionUpdate.co.nz Fact sheet for Patients: ConventionUpdate.co.nz   Methodology: Isothermal Nucleic Acid Amplification                 Imaging:   No results found.          Torsten Juares MD  October 8, 2021  7:53 AM

## 2021-10-14 LAB
BACTERIA SPEC CULT: NORMAL
BACTERIA SPEC CULT: NORMAL
SPECIAL REQUESTS,SREQ: NORMAL
SPECIAL REQUESTS,SREQ: NORMAL

## 2021-10-18 ENCOUNTER — PATIENT OUTREACH (OUTPATIENT)
Dept: OTHER | Age: 61
End: 2021-10-18

## 2021-10-18 RX ORDER — FLUTICASONE PROPIONATE 50 MCG
2 SPRAY, SUSPENSION (ML) NASAL DAILY
COMMUNITY
End: 2022-01-19 | Stop reason: SDUPTHER

## 2021-10-18 NOTE — PATIENT INSTRUCTIONS
Learning About Heart Failure  What is heart failure? Heart failure means that your heart muscle doesn't pump as much blood as your body needs. Failure doesn't mean that your heart has stopped. It means that your heart isn't pumping as well as it should. Because your heart cannot pump well, your body tries to make up for it. To do this:  · Your body holds on to salt and water. This increases the amount of blood in your bloodstream.  · Your heart beats faster. · Your heart might get bigger. Your body has an amazing ability to make up for heart failure. It may do such a good job that you don't know you have a disease. But at some point, your heart and body will no longer be able to keep up. Then fluid starts to build up in your lungs and other parts of your body. This fluid buildup is called congestion. It's why some doctors call the disease congestive heart failure. What can you expect when you have heart failure? Heart failure is a lifelong (chronic) disease. Treatment may be able to slow the disease and help you feel better. But heart failure tends to get worse over time. Despite this, there are many steps you can take to feel better and stay healthy longer. Early on, your symptoms may not be too bad. As heart failure gets worse, symptoms typically get worse, and you may need to limit your activities. Heart failure can also get worse suddenly. If this happens, you need emergency care. Then, after treatment, your symptoms may go back to being stable (which means they stay the same) for a long time. Heart failure can lead to other health problems, such as heart rhythm problems. Over time, your treatment options may change, especially as your symptoms get worse. You may want to think about what kind of care you want at the end of your life. What are the symptoms? Symptoms of heart failure start to happen when your heart can't pump enough blood to the rest of your body.   In the early stages of heart failure, you may:  · Feel tired easily. · Be short of breath when you exert yourself. · Feel like your heart is pounding or racing (palpitations). · Feel weak or dizzy. As heart failure gets worse, fluid starts to build up in your lungs and other parts of your body. This may cause you to:  · Feel short of breath even at rest.  · Have swelling (edema), especially in your legs, ankles, and feet. · Gain weight. This may happen over just a day or two, or more slowly. · Cough or wheeze, especially when you lie down. · Feel bloated or sick to your stomach. How is heart failure treated? Heart failure is treated with medicines and steps you take to make lifestyle changes and check your symptoms. Treatment can slow the disease and help you feel better and live longer. · You'll probably take several medicines. · You'll take steps to care for yourself at home. Azael Thompson watch for changes in your symptoms. You may need to make lifestyle changes, such as limiting sodium, getting regular exercise, not smoking, and eating healthy foods. · You might attend cardiac rehabilitation (rehab) to get education and support that help you make lifestyle changes and stay as healthy as possible. · You may get a heart device. A pacemaker helps your heart pump blood. An ICD can stop abnormal heart rhythms. · As heart failure gets worse, palliative care can help improve your quality of life. You can do advance care planning to decide what kind of care you want at the end of your life. How can you care for yourself? There are many steps you can take to feel better and live longer. They can help you stay active and enjoy life. Take your medicine the right way. Avoid medicines that can make your symptoms worse. Check your weight and symptoms every day. Know what to do if your symptoms get worse. Limit sodium. This helps keep fluid from building up. It may help you feel better. Be active.    Exercise regularly, but don't exercise too hard. Be heart-healthy. Eat healthy foods, stay at a healthy weight, limit alcohol, and don't smoke. Stay as healthy as possible. Manage other health problems such as diabetes and high blood pressure. Avoid colds and flu, get help for depression and anxiety, and manage stress. If you think you may have a problem with alcohol or drug use, talk to your doctor. Ask your doctor if you need to limit the amount of fluids you drink. Follow-up care is a key part of your treatment and safety. Be sure to make and go to all appointments, and call your doctor if you are having problems. It's also a good idea to know your test results and keep a list of the medicines you take. Where can you learn more? Go to http://www.gray.com/  Enter [id number] in the search box to learn more about \"Learning About Heart Failure. \"  Current as of: April 29, 2021               Content Version: 13.0  © 2006-2021 [employer]. Care instructions adapted under license by [employer] (which disclaims liability or warranty for this information). If you have questions about a medical condition or this instruction, always ask your healthcare professional. [address] any warranty or liability for your use of this information.

## 2021-10-18 NOTE — PROGRESS NOTES
HPRP progress note    Patient eligible for [facility] 9[address] care management    Received notification of discharge from [facility] on 10/8 for Acute CHF, HTN. Contacted patient to discuss post discharge needs and offer care management services. Two patient identifiers verified. Discussed the care management program.  Patient agrees to care management services at this time. PMH:   Past Medical History:   Diagnosis Date    Calculus of kidney     Congestive heart failure ([address]     Essential hypertension     Goiter        Social History:   Social History     Socioeconomic History    Marital status: SINGLE     Spouse name: Not on file    Number of children: Not on file    Years of education: Not on file    Highest education level: Not on file   Occupational History    Not on file   Tobacco Use    Smoking status: Never Smoker    Smokeless tobacco: Never Used   Substance and Sexual Activity    Alcohol use: Not Currently    Drug use: Never    Sexual activity: Not on file   Other Topics Concern    Not on file   Social History Narrative    Not on file     Social Determinants of Health     Financial Resource Strain:     Difficulty of Paying Living Expenses:    Food Insecurity:     Worried About Running Out of Food in the Last Year:     Ran Out of Food in the Last Year:    Transportation Needs:     Lack of Transportation (Medical):      Lack of Transportation (Non-Medical):    Physical Activity:     Days of Exercise per Week:     Minutes of Exercise per Session:    Stress:     Feeling of Stress :    Social Connections:     Frequency of Communication with Friends and Family:     Frequency of Social Gatherings with Friends and Family:     Attends Anabaptist Services:     Active Member of Clubs or Organizations:     Attends Club or Organization Meetings:     Marital Status:    Intimate Partner Violence:     Fear of Current or Ex-Partner:     Emotionally Abused:     Physically Abused:     Sexually Abused:        Patient's primary care provider relationship reviewed with patient and modified, as applicable. Care management assessment completed:    Cardiac Condition Focused Assessment  Skin- any open wounds or incisions? no  Description of wound- n/a  Edema or swelling? yes   If yes, where? Swelling   Change in weight >3 lbs in one day, or > 10lbs without explanation? N/a patient not weighing in, encouraged her to do so   If yes, was this reported to provider? no  Recommended goal weight in lbs 197 lbs  Weight at discharge in lbs 194 lbs    Patient reported vital signs and important numbers to know:  Last /99   Pulse for 60 seconds 72   Temp 97.7   Pain 0-10 none   Location/pain characteristics n/a   Last daily weight in lbs 194 lbs at d/c     Activity level- moving several times a day, or as recommended? yes   Abnormal activity level reported: n/a  Nutrition- prescribed diet? No salt; 1500 fluid restriction   Diet type: low NA  Last reported BM: 10/18  Urinating regularly? yes  clear, yellow? yes   Abnormal urine characteristics reported: no  Was aspirin or other anticoagulant prescribed? yes   Betablocker? no   ACE/ARB? yes   Is patient on anticoagulant therapy? no   If so, any needed lab monitoring needed, but not ordered? no       Red Flags:  CHF zones:  Green zone. You are doing well. This is where you want to be. · Your weight is stable. It's not going up or down. · You breathe easily. · You are sleeping well. You are able to lie flat without shortness of breath. · You can do your usual activities. Yellow zone. Be careful. Your symptoms are changing. Call your doctor. · You have new or increased shortness of breath. · You are dizzy or lightheaded, or you feel like you may faint. · You have sudden weight gain, such as more than 2 to 3 pounds in a day or 5 pounds in a week.  (Your doctor may suggest a different range of weight gain.)  · You have increased swelling in your legs, ankles, or feet. · You are so tired or weak that you can't do your usual activities. · You are not sleeping well. Shortness of breath wakes you up at night. You need extra pillows. Red zone. This is an emergency. Call 911. You have symptoms of sudden heart failure. For example:  · You have severe trouble breathing. · You cough up pink, foamy mucus. · You have a new irregular or fast heartbeat. You have symptoms of a heart attack. These may include:  · Chest pain or pressure, or a strange feeling in the chest.  · Sweating. · Shortness of breath. · Nausea or vomiting. · Pain, pressure, or a strange feeling in the back, neck, jaw, or upper belly or in one or both shoulders or arms. · Lightheadedness or sudden weakness. · A fast or irregular heartbeat. Medications:  New Medications at Discharge: Losartan 100 mg tablet daily  Changed Medications at Discharge: none  Discontinued Medications at Discharge: toradol (not taking)  Current Outpatient Medications   Medication Sig    losartan (COZAAR) 100 mg tablet Take 1 Tablet by mouth daily. Indications: chronic heart failure    atorvastatin (LIPITOR) 40 mg tablet Take 1 Tablet by mouth daily.  montelukast (Singulair) 10 mg tablet Take 1 Tablet by mouth daily.  amLODIPine (NORVASC) 10 mg tablet Take 1 tablet by mouth once daily    ferrous sulfate 325 mg (65 mg iron) tablet TAKE 1 TABLET BY MOUTH ONCE DAILY WITH  VITAMIN  C    ondansetron (ZOFRAN ODT) 8 mg disintegrating tablet Take 1 Tab by mouth every eight (8) hours as needed for Nausea or Vomiting.  carvediloL (COREG) 25 mg tablet Take 1 Tab by mouth two (2) times daily (with meals).  aspirin (ASPIRIN) 325 mg tablet Take 325 mg by mouth daily.  ascorbic acid, vitamin C, (Vitamin C) 500 mg tablet Take  by mouth.  furosemide (Lasix) 40 mg tablet Take 40 mg by mouth daily.  cyanocobalamin 1,000 mcg tablet Take 1,000 mcg by mouth daily.     potassium chloride (K-DUR, KLOR-CON) 20 mEq tablet Take 20 mEq by mouth daily.  triamcinolone (NASACORT AQ) 55 mcg nasal inhaler 2 Sprays by Both Nostrils route daily. No current facility-administered medications for this visit. There are no discontinued medications. Performed medication reconciliation with patient, and patient verbalizes understanding of administration of home medications. There were no barriers to obtaining medications identified at this time. Preventive Care     Health Maintenance   Topic Date Due    COVID-19 Vaccine (1) Never done    Cervical cancer screen  Never done    DTaP/Tdap/Td series (1 - Tdap) 01/02/2008    Shingrix Vaccine Age 50> (1 of 2) Never done    Breast Cancer Screen Mammogram  04/12/2019    Lipid Screen  10/01/2022    Colorectal Cancer Screening Combo  09/03/2024    Hepatitis C Screening  Completed    Flu Vaccine  Completed    Pneumococcal 0-64 years  Aged Out         Healthy Habits    Nutrition:    Movement: Walking    CM Identified  Problems   1. Potential Learning Need  2. Follow up  3. Resources  Goals Addressed                 This Visit's Progress     Completes all follow-up appointments within 30 days of ED visit         Educate and encourage importance of FU for prevention of complications or disease;   Assess the patient's relationship with a PCP and next FU visit scheduled;  o PCP f/up on 10/19   Discuss importance of adherence to treatment plan and follow up visits;   Identify any barriers in transportation or access to FU appointments.  Assist patient with making FU appointments as needed;    It's also a good idea to know your test results.  Keep a list of the medicines you take.        Demonstrates no return to ED or red flags within 30 days        · Assess patient knowledge of how to contact the provider for questions if needed;  · Educate patient on importance of monitoring for any red flags;  · Review importance of reporting any changes, red flags to the provider and/or PCP;  · Assess patient's knowledge of discharge instructions and any restrictions;  · Educate patient when to call 911;  · Assess for any barriers with safety at home  · Discuss use of nurse access line and location of number on front of insurance card. [facility] Supportive Resources        · [facility] - # [phone]  · [employer] 24/7 (virtual visits 24/7)  · [employer] # [phone] PW: [facility] for associates  · Nurse Access line 24/7 # [phone]  · [employer] (www.[employer])  · [employer] - [place]   · [employer] Work[employer]  · IT - 4-919-919-190-741-0512  · [employer] Help - 1- 444.393.5409  Associate Services for advice and direction, by calling [phone] and pressing             Barriers/Support system:  patient and daughter    Barriers/Challenges to Care: []  Decline in memory    []  Language barrier     []  Emotional                  []  Limited mobility  []  Lack of motivation     [] Vision, hearing or cognitive impairment []  Knowledge [] Financial Barriers []  Lack of support  []  Pain []  Other [x]  None    PCP/Specialist follow up: Patient scheduled to follow up with Sandra Dhaliwal NP on 10/19/21. Future Appointments   Date Time Provider Faina Ambrose   10/19/2021  9:15 AM Sandra Dhaliwal NP [facility] AMOL RODRIGUEZ   11/12/2021  9:45 AM Sandra Dhaliwal NP [facility] BS AMB      Reviewed red flags with patient, and patient verbalizes understanding. Patient given an opportunity to ask questions. No other clinical/social/functional needs noted. The patient agrees to contact the PCP office for questions related to their healthcare. The patient expressed thanks, offered no additional questions and ended the call. Plan for next call:  Call on 10/22 to check in.

## 2021-10-19 ENCOUNTER — OFFICE VISIT (OUTPATIENT)
Dept: FAMILY MEDICINE CLINIC | Age: 61
End: 2021-10-19
Payer: COMMERCIAL

## 2021-10-19 VITALS
BODY MASS INDEX: 31.12 KG/M2 | WEIGHT: 193.6 LBS | TEMPERATURE: 98.4 F | DIASTOLIC BLOOD PRESSURE: 88 MMHG | SYSTOLIC BLOOD PRESSURE: 136 MMHG | HEART RATE: 84 BPM | OXYGEN SATURATION: 97 % | HEIGHT: 66 IN

## 2021-10-19 DIAGNOSIS — I10 ESSENTIAL HYPERTENSION: ICD-10-CM

## 2021-10-19 DIAGNOSIS — Z09 HOSPITAL DISCHARGE FOLLOW-UP: ICD-10-CM

## 2021-10-19 DIAGNOSIS — Z91.199 NON-COMPLIANCE: ICD-10-CM

## 2021-10-19 DIAGNOSIS — I42.9 CARDIOMYOPATHY, UNSPECIFIED TYPE (HCC): ICD-10-CM

## 2021-10-19 DIAGNOSIS — Z12.31 ENCOUNTER FOR SCREENING MAMMOGRAM FOR MALIGNANT NEOPLASM OF BREAST: ICD-10-CM

## 2021-10-19 DIAGNOSIS — I50.9 CONGESTIVE HEART FAILURE, UNSPECIFIED HF CHRONICITY, UNSPECIFIED HEART FAILURE TYPE (HCC): Primary | ICD-10-CM

## 2021-10-19 PROCEDURE — 99495 TRANSJ CARE MGMT MOD F2F 14D: CPT | Performed by: NURSE PRACTITIONER

## 2021-10-19 NOTE — PROGRESS NOTES
Janice Mcnamara presents today for   Chief Complaint   Patient presents with   [facility] Follow Up     congested heart failure       Is someone accompanying this pt? no    Is the patient using any DME equipment during OV? no    Depression Screening:  3 most recent PHQ Screens 10/19/2021   Little interest or pleasure in doing things Not at all   Feeling down, depressed, irritable, or hopeless Not at all   Total Score PHQ 2 0       Learning Assessment:  Learning Assessment 9/1/2020   PRIMARY LEARNER Patient   HIGHEST LEVEL OF EDUCATION - PRIMARY LEARNER  SOME COLLEGE   BARRIERS PRIMARY LEARNER NONE   CO-LEARNER CAREGIVER No   PRIMARY LANGUAGE ENGLISH   LEARNER PREFERENCE PRIMARY LISTENING   ANSWERED BY patient   RELATIONSHIP SELF       Fall Risk  Fall Risk Assessment, last 12 mths 8/24/2020   Able to walk? Yes   Fall in past 12 months? No       Health Maintenance reviewed and discussed and ordered per Provider. Health Maintenance Due   Topic Date Due    COVID-19 Vaccine (1) Never done    Cervical cancer screen  Never done    DTaP/Tdap/Td series (1 - Tdap) 01/02/2008    Shingrix Vaccine Age 50> (1 of 2) Never done    Breast Cancer Screen Mammogram  04/12/2019   . Coordination of Care:  1. Have you been to the ER, urgent care clinic since your last visit? Hospitalized since your last visit? Hospitalized on 10/5/21-10/8/21    2. Have you seen or consulted any other health care providers outside of the [address] [place] [address] since your last visit? Include any pap smears or colon screening.  NO

## 2021-10-20 NOTE — PROGRESS NOTES
Sandee Aguilar is a 57 y. o.female presents with   Chief Complaint   Patient presents with   [facility] Follow Up     congested heart failure     54-year-old female presents today in office for hospital follow-up. She was admitted to [facility] 2021 discharge 2021 for acute exacerbation of CHF. Patient has longstanding history of essential hypertension and CHF with issues with compliance in the past as well as currently. She openly reported to ED staff that she was not taking her furosemide 40 mg once daily as it \"made her pee all day \". Patient was diuresed during hospitalization output roughly 1 L she was advised to continue her multiple hypertensive medications to include her furosemide she has cardiac follow-up within the next week per patient. She denies any dyspnea or chest pain currently. Subjective:           Past Medical History:   Diagnosis Date    Calculus of kidney     Congestive heart failure (HCC)     Essential hypertension     Goiter      Past Surgical History:   Procedure Laterality Date    HX  SECTION      HX COLONOSCOPY  2019    HX HEART CATHETERIZATION      HX TUBAL LIGATION       Social History     Socioeconomic History    Marital status: SINGLE     Spouse name: Not on file    Number of children: Not on file    Years of education: Not on file    Highest education level: Not on file   Tobacco Use    Smoking status: Never Smoker    Smokeless tobacco: Never Used   Substance and Sexual Activity    Alcohol use: Not Currently    Drug use: Never     Social Determinants of Health     Financial Resource Strain:     Difficulty of Paying Living Expenses:    Food Insecurity:     Worried About Running Out of Food in the Last Year:     Ran Out of Food in the Last Year:    Transportation Needs:     Lack of Transportation (Medical):      Lack of Transportation (Non-Medical):    Physical Activity:     Days of Exercise per Week:  Minutes of Exercise per Session:    Stress:     Feeling of Stress :    Social Connections:     Frequency of Communication with Friends and Family:     Frequency of Social Gatherings with Friends and Family:     Attends [employer] Services:     Active Member of Clubs or Organizations:     Attends Club or Organization Meetings:     Marital Status:      Current Outpatient Medications   Medication Sig Dispense Refill    fluticasone propionate (Flonase Allergy Relief) 50 mcg/actuation nasal spray 2 Sprays by Both Nostrils route daily.  losartan (COZAAR) 100 mg tablet Take 1 Tablet by mouth daily. Indications: chronic heart failure 30 Tablet 0    atorvastatin (LIPITOR) 40 mg tablet Take 1 Tablet by mouth daily. 42 Tablet 0    montelukast (Singulair) 10 mg tablet Take 1 Tablet by mouth daily. 42 Tablet 0    amLODIPine (NORVASC) 10 mg tablet Take 1 tablet by mouth once daily 30 Tablet 6    ferrous sulfate 325 mg (65 mg iron) tablet TAKE 1 TABLET BY MOUTH ONCE DAILY WITH  VITAMIN  C 30 Tab 0    carvediloL (COREG) 25 mg tablet Take 1 Tab by mouth two (2) times daily (with meals). 60 Tab 5    aspirin (ASPIRIN) 325 mg tablet Take 325 mg by mouth daily.  ascorbic acid, vitamin C, (Vitamin C) 500 mg tablet Take  by mouth.  furosemide (Lasix) 40 mg tablet Take 40 mg by mouth daily.  cyanocobalamin 1,000 mcg tablet Take 1,000 mcg by mouth daily.  potassium chloride (K-DUR, KLOR-CON) 20 mEq tablet Take 20 mEq by mouth daily.  ondansetron (ZOFRAN ODT) 8 mg disintegrating tablet Take 1 Tab by mouth every eight (8) hours as needed for Nausea or Vomiting. (Patient not taking: Reported on 10/18/2021) 20 Tab 0    triamcinolone (NASACORT AQ) 55 mcg nasal inhaler 2 Sprays by Both Nostrils route daily.  (Patient not taking: Reported on 10/18/2021)       Allergies   Allergen Reactions    Lisinopril Cough     Patient is still taking medication     Nitroglycerin Other (comments)     Headaches with patch only. Can take sublingual without any difficulty     The patient has a family history of    REVIEW OF SYSTEMS  Review of Systems   Constitutional: Negative for chills and fever. HENT: Negative for ear discharge, ear pain, hearing loss, sinus pain and sore throat. Eyes: Negative for pain. Respiratory: Negative for cough and shortness of breath. Cardiovascular: Negative for chest pain, palpitations and leg swelling. Gastrointestinal: Negative for abdominal pain, nausea and vomiting. Genitourinary: Negative for dysuria, frequency and urgency. Musculoskeletal: Negative for falls, myalgias and neck pain. Skin: Negative for rash. Neurological: Negative for dizziness, tingling, tremors and headaches. Psychiatric/Behavioral: Negative for depression, substance abuse and suicidal ideas. The patient is not nervous/anxious and does not have insomnia.         Objective:     Visit Vitals  /88 (BP 1 Location: Left upper arm, BP Patient Position: Sitting, BP Cuff Size: Adult)   Pulse 84   Temp 98.4 °F (36.9 °C) (Temporal)   Ht 5' 6\" (1.676 m)   Wt 193 lb 9.6 oz (87.8 kg)   SpO2 97%   BMI 31.25 kg/m²       Current Outpatient Medications   Medication Instructions    amLODIPine (NORVASC) 10 mg tablet Take 1 tablet by mouth once daily    ascorbic acid, vitamin C, (Vitamin C) 500 mg tablet Oral    aspirin (ASPIRIN) 325 mg, Oral, DAILY    atorvastatin (LIPITOR) 40 mg, Oral, DAILY    carvediloL (COREG) 25 mg, Oral, 2 TIMES DAILY WITH MEALS    cyanocobalamin 1,000 mcg, Oral, DAILY    ferrous sulfate 325 mg (65 mg iron) tablet TAKE 1 TABLET BY MOUTH ONCE DAILY WITH  VITAMIN  C    fluticasone propionate (Flonase Allergy Relief) 50 mcg/actuation nasal spray 2 Sprays, Both Nostrils, DAILY    furosemide (LASIX) 40 mg, Oral, DAILY    losartan (COZAAR) 100 mg, Oral, DAILY    montelukast (SINGULAIR) 10 mg, Oral, DAILY    ondansetron (ZOFRAN ODT) 8 mg, Oral, EVERY 8 HOURS AS NEEDED    potassium chloride (K-DUR, KLOR-CON) 20 mEq tablet 20 mEq, Oral, DAILY    triamcinolone (NASACORT AQ) 55 mcg nasal inhaler 2 Sprays, DAILY        PHYSICAL EXAM  Physical Exam  Constitutional:       Appearance: Normal appearance. Cardiovascular:      Heart sounds: Normal heart sounds. Pulmonary:      Breath sounds: Normal breath sounds. Musculoskeletal:      Right lower leg: Edema (Trace bilateral lower extremities ankles) present. Left lower leg: Edema present. Skin:     General: Skin is warm and dry. Neurological:      Mental Status: She is alert and oriented to person, place, and time. Psychiatric:         Mood and Affect: Mood normal.         Behavior: Behavior normal.         Assessment/Plan:     Diagnoses and all orders for this visit:    1. Congestive heart failure, unspecified HF chronicity, unspecified heart failure type ([address]    2. Essential hypertension    3. Cardiomyopathy, unspecified type ([facility] [address]    4. Encounter for screening mammogram for malignant neoplasm of breast  -     DEISY MAMMO BI SCREENING INCL CAD; Future    5. Hospital discharge follow-up    6. Non-compliance  Lengthy discussion with the patient in regards to her withholding medications. I urged her to follow current medication regimen as advised keep all follow-ups patient verbalized understanding. Disclaimer:    I have discussed the diagnosis with the patient and the intended plan as seen above. The patient understands our medical plan. The risks, benefits and significant side effects of all medications have been reviewed. Anticipated time course and progression of condition reviewed. All questions have been addressed. She received an after visit summary, with information reviewed, and questions answered.       Where appropriate, she is instructed to call the clinic if she has not been notified either by phone or through [address] with the results of her tests or with an appointment plan for any referrals within 1 week(s). The patient  is to call if her condition worsens or fails to improve or if significant side effects are experienced.        Funmi Erazo NP

## 2021-10-22 ENCOUNTER — HOSPITAL ENCOUNTER (OUTPATIENT)
Dept: MAMMOGRAPHY | Age: 61
Discharge: HOME | End: 2021-10-22
Attending: NURSE PRACTITIONER
Payer: COMMERCIAL

## 2021-10-22 ENCOUNTER — PATIENT OUTREACH (OUTPATIENT)
Dept: OTHER | Age: 61
End: 2021-10-22

## 2021-10-22 DIAGNOSIS — Z12.31 ENCOUNTER FOR SCREENING MAMMOGRAM FOR MALIGNANT NEOPLASM OF BREAST: ICD-10-CM

## 2021-10-22 PROCEDURE — 77067 SCR MAMMO BI INCL CAD: CPT

## 2021-10-22 NOTE — PROGRESS NOTES
Follow up phone call to patient, two pt identifiers verified. Discussed patient's goals:   Goals      Completes all follow-up appointments within 30 days of ED visit       Educate and encourage importance of FU for prevention of complications or disease;   Assess the patient's relationship with a PCP and next FU visit scheduled;  o PCP f/up on 10/19   Discuss importance of adherence to treatment plan and follow up visits;   Identify any barriers in transportation or access to FU appointments.  Assist patient with making FU appointments as needed;    It's also a good idea to know your test results.  Keep a list of the medicines you take. 10/22: Patient had f/up appt with her PCP on 10/19, states her BP was better and she is feeling better. Has Mammogram scheduled for today, 10/22.  Demonstrates no return to ED or red flags within 30 days      · Assess patient knowledge of how to contact the provider for questions if needed;  · Educate patient on importance of monitoring for any red flags;  · Review importance of reporting any changes, red flags to the provider and/or PCP;  · Assess patient's knowledge of discharge instructions and any restrictions;  · Educate patient when to call 911;  · Assess for any barriers with safety at home  · Discuss use of nurse access line and location of number on front of insurance card. 10/22: No red flags. [facility] Supportive Resources      · [facility] - # [phone]  · [employer] 24/7 (virtual visits 24/7)  · Life Matters # [phone] PW: [facility] for associates  · Nurse Access line 24/7 # [phone]  · Be Well (www.[employer])  · HR Service Now - Iris   · [employer] Workday  · IT - [phone]  · MyChart Help - 1- [phone]  Associate Services for advice and direction, by calling [phone] and pressing             Patient's primary care provider relationship reviewed with patient and modified, as applicable.     Readiness to Change: [] Pre-contemplative    []  Contemplative  []  Preparation               [x]  Action                  []  Maintenance    Barriers/Challenges to Care: []  Decline in memory    []  Language barrier     []  Emotional                  []  Limited mobility  []  Lack of motivation     [] Vision, hearing or cognitive impairment []  Knowledge [] Financial Barriers []  Lack of support  []  Pain []  Other [x]  None    Key pt activities to achieve better health:   []  Weight loss  []  Improved diabetic control  []  Decreased cholesterol levels  [x]  Decreased blood pressure  []    []    Upcoming appointments:   Future Appointments   Date Time Provider Faina Ambrose   10/22/2021 11:30 AM [address]   1/25/2022 10:00 AM Onel Valente NP [facility] BS AMB     Plan for next call: Call in three weeks, 11/12.

## 2021-11-12 ENCOUNTER — PATIENT OUTREACH (OUTPATIENT)
Dept: OTHER | Age: 61
End: 2021-11-12

## 2021-11-12 NOTE — PROGRESS NOTES
Attempt to reach patient for follow up. Discreet VM left with contact information. Will review chart again in one week, 11/19.

## 2021-11-12 NOTE — PROGRESS NOTES
Follow up phone call to patient, two pt identifiers verified. Discussed patient's goals:   Goals      Completes all follow-up appointments within 30 days of ED visit       Educate and encourage importance of FU for prevention of complications or disease;   Assess the patient's relationship with a PCP and next FU visit scheduled;  o PCP f/up on 10/19   Discuss importance of adherence to treatment plan and follow up visits;   Identify any barriers in transportation or access to FU appointments.  Assist patient with making FU appointments as needed;    It's also a good idea to know your test results.  Keep a list of the medicines you take. 10/22: Patient had f/up appt with her PCP on 10/19, states her BP was better and she is feeling better. Has Mammogram scheduled for today, 10/22.  11/12: Feeling better, SOB has gotten with lasix. On heart monitor until Monday, 11/15.  Demonstrates no return to ED or red flags within 30 days      · Assess patient knowledge of how to contact the provider for questions if needed;  · Educate patient on importance of monitoring for any red flags;  · Review importance of reporting any changes, red flags to the provider and/or PCP;  · Assess patient's knowledge of discharge instructions and any restrictions;  · Educate patient when to call 911;  · Assess for any barriers with safety at home  · Discuss use of nurse access line and location of number on front of insurance card. 10/22: No red flags.   11/12: no red flags      [facility] Supportive Resources      · [facility] - # [phone]  · [facility] 24/7 (virtual visits 24/7)  · Life Matters # [phone] PW: [facility] for associates  · Nurse Access line 24/7 # [phone]  · Be Well (www.[employer])  · HR Service Now - Iris   · BSM Workday  · IT - [phone]  · MyChart Help - 1- [phone]  Associate Services for advice and direction, by calling [phone] and pressing     11/12: Patient concerned with financials. 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 Informed her of [employer] Our [employer]. Patient states she does not have a computer,  would like to apply. Will refer Anahi WALLACE             Patient's primary care provider relationship reviewed with patient and modified, as applicable. Readiness to Change: []  Pre-contemplative    []  Contemplative  []  Preparation               [x]  Action                  []  Maintenance    Barriers/Challenges to Care: []  Decline in memory    []  Language barrier     []  Emotional                  []  Limited mobility  []  Lack of motivation     [] Vision, hearing or cognitive impairment []  Knowledge [] Financial Barriers []  Lack of support  []  Pain []  Other [x]  None    Key pt activities to achieve better health:   []  Weight loss  []  Improved diabetic control  []  Decreased cholesterol levels  []  Decreased blood pressure  [x]  Continue with treatments and follow ups  []    Upcoming appointments:   Future Appointments   Date Time Provider Faina Ambrose   1/25/2022 10:00 AM Courtney Gilman NP [facility] BS AMB     Plan for next call: Call in one week, 11/19. Referral to Court Cardoso for a call next week.

## 2021-11-15 ENCOUNTER — PATIENT OUTREACH (OUTPATIENT)
Dept: OTHER | Age: 61
End: 2021-11-15

## 2021-11-15 NOTE — PROGRESS NOTES
MSW ECM attempted to contact for evaluation. Patient identifiers confirmed, zip code and . No answer, VM left. MSW ECM will call patient again if call is not returned.

## 2021-11-19 ENCOUNTER — PATIENT OUTREACH (OUTPATIENT)
Dept: OTHER | Age: 61
End: 2021-11-19

## 2021-11-19 NOTE — PROGRESS NOTES
MADISON DOYLE contacted patient for evaluation. Patient identifiers confirmed. Purpose of call  Address financial stressors    Financial  Ms. Indy Gil recently submitted the paperwork for STD. She has not received STD payments at this time. Ms. Indy Gil is concerned that she may need to return to work earlier than advised to afford her living expenses. She is concerned about the below expenses in particular that are due 12/5/21.    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 Household  Patient resides alone. Plan of action  Ms. Roseanna Carey primary concern is paying her rent and utility bill. She currently has no income. MADISON DOYLE explored applying for the [facility] for Your Own Gunjan Pelletier. Patient is interested in applying. She does not have access to a computer to apply. MADISON DOYLE agreed to assist.  Patient stated that she does not have a rent statement. MADISON DOYLE informed patient that a statement was needed in order to apply. Patient will obtain needed documents and contact MADISON DOYLE. MADISON DOYLE will assess for additional resources once the [employer] Your Own Gunjan Pelletier has been completed.

## 2021-11-19 NOTE — PROGRESS NOTES
Follow up phone call to patient, two pt identifiers verified. Discussed patient's goals:   Goals      Completes all follow-up appointments within 30 days of ED visit       Educate and encourage importance of FU for prevention of complications or disease;   Assess the patient's relationship with a PCP and next FU visit scheduled;  o PCP f/up on 10/19   Discuss importance of adherence to treatment plan and follow up visits;   Identify any barriers in transportation or access to FU appointments.  Assist patient with making FU appointments as needed;    It's also a good idea to know your test results.  Keep a list of the medicines you take. 10/22: Patient had f/up appt with her PCP on 10/19, states her BP was better and she is feeling better. Has Mammogram scheduled for today, 10/22.  11/12: Feeling better, SOB has gotten with lasix. On heart monitor until Monday, 11/15.  11/19: May have heart cath based on findings through heart monitor, states her cardiologist is attempting to get a date for this procedure. Provided support and encouragement. Next appt with cardiology is on 12/1.  Demonstrates no return to ED or red flags within 30 days      · Assess patient knowledge of how to contact the provider for questions if needed;  · Educate patient on importance of monitoring for any red flags;  · Review importance of reporting any changes, red flags to the provider and/or PCP;  · Assess patient's knowledge of discharge instructions and any restrictions;  · Educate patient when to call 911;  · Assess for any barriers with safety at home  · Discuss use of nurse access line and location of number on front of insurance card. 10/22: No red flags. 11/12: no red flags  11/19: No red flags.       [facility] Supportive Resources      · [facility] - # [phone]  · [employer] 24/7 (virtual visits 24/7)  · Life Matters # [phone] PW: [facility] for associates  · Nurse Access line 24/7 # [phone]  · Be Well (www.[email or web address])  · HR Service Now - Iris   · BSM Workday  · IT - [phone]  · [employer] Help - 1- [phone]  Associate Services for advice and direction, by calling [phone] and pressing     11/12: Patient concerned with financials. 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 Patient states she does not have a computer,  would like to apply. Will refer Buddy WALLACE             Patient's primary care provider relationship reviewed with patient and modified, as applicable. Readiness to Change: []  Pre-contemplative    []  Contemplative  []  Preparation               [x]  Action                  []  Maintenance    Barriers/Challenges to Care: []  Decline in memory    []  Language barrier     []  Emotional                  []  Limited mobility  []  Lack of motivation     [] Vision, hearing or cognitive impairment []  Knowledge [x] Financial Barriers []  Lack of support  []  Pain []  Other []  None    Patient states she would like to apply for [employer] for Our Own. Buddy WALLACE attempted to reach patient to complete application on 18/15, was unable to reach her, voicemail message left for patient. Patient gave consent for MADISON Marinelli to call her. Will route chart to Ciara Garcia. Upcoming appointments:   Future Appointments   Date Time Provider Faina Ambrose   1/25/2022 10:00 AM Martín Ley NP [facility] BS AMB     Plan for next call: Call on 11/29. Cath scheduled?

## 2021-11-22 DIAGNOSIS — Z76.0 MEDICATION REFILL: Primary | ICD-10-CM

## 2021-11-22 RX ORDER — MONTELUKAST SODIUM 10 MG/1
10 TABLET ORAL DAILY
Qty: 30 TABLET | Refills: 2 | Status: SHIPPED | OUTPATIENT
Start: 2021-11-22 | End: 2021-12-08 | Stop reason: SDUPTHER

## 2021-11-23 ENCOUNTER — PATIENT OUTREACH (OUTPATIENT)
Dept: OTHER | Age: 61
End: 2021-11-23

## 2021-11-23 NOTE — PROGRESS NOTES
MSW ECM attempted to contact patient. No answer. VM left. The purpose of TC was to complete [facility] [facility] [place] for rent and utility assistance.

## 2021-11-24 ENCOUNTER — PATIENT OUTREACH (OUTPATIENT)
Dept: OTHER | Age: 61
End: 2021-11-24

## 2021-11-24 NOTE — PROGRESS NOTES
MADISON DOYLE contacted patient for follow-up. Patient identifiers confirmed, zip code and . Purpose of TC  Complete financial assistance application. TC details  Patient does not have access to a computer. [facility] assisted patient with completing application for [employer]. The application was completed, requesting $820 ($246 Utilities, $160 Overdue rent, $502 December Rent). Patient stated that prior to the medical issues, she also had difficulty afford expenses. There is food insecurity. MADISON DOYLE will seek options for ongoing financial support. Plan of action  [facility] will seek ongoing financial resources to assist patient.

## 2021-11-26 ENCOUNTER — PATIENT OUTREACH (OUTPATIENT)
Dept: OTHER | Age: 61
End: 2021-11-26

## 2021-11-26 NOTE — PROGRESS NOTES
MADISON DOYLE contacted Ms. Margarita London for follow-up. Patient identifiers confirmed, zip code and . Purpose of TC  Address food insecurity    TC details  Ms. Margarita London stated that she does not received SNAP benefits but was interested. Patient does not have internet capability to complete application online. MADISON DOYLE offered to assist patient. The application will take approximately 30 minutes. MADISON DOYLE scheduled a time next week to assist patient with the application and explore additional nutrition resources.

## 2021-11-29 ENCOUNTER — PATIENT OUTREACH (OUTPATIENT)
Dept: OTHER | Age: 61
End: 2021-11-29

## 2021-11-29 NOTE — PROGRESS NOTES
MADISON DOYLE received a VM from patient. Patient stated that she was able to complete the [facility] application via telephone (Donay).

## 2021-11-29 NOTE — PROGRESS NOTES
MSW ECM attempted to contact patient for scheduled appointment to complete [facility] online application. There was no answer. VM left.

## 2021-11-29 NOTE — PROGRESS NOTES
MADISON DOYLE received a return TC from patient. Patient identifiers confirmed, zip code and . MADISON DOYLE attempted to sign on to the [employer] website to complete application. The site was down. MADISON DOYLE provided patient with the telephone number to apply for SNAP. Patient stated that should would use that method. MADISON DOYLE will call patient later today to confirm that she was able to apply for benefits.      [facility]  [phone]

## 2021-12-06 ENCOUNTER — PATIENT OUTREACH (OUTPATIENT)
Dept: OTHER | Age: 61
End: 2021-12-06

## 2021-12-06 NOTE — PROGRESS NOTES
Patient left me a voicemail message on 12/3/21 stating that her provider would like for her to stay out of work until her procedure on 1/3/22. Stated she is doing well.

## 2021-12-08 DIAGNOSIS — Z76.0 MEDICATION REFILL: ICD-10-CM

## 2021-12-08 RX ORDER — LOSARTAN POTASSIUM 100 MG/1
100 TABLET ORAL DAILY
Qty: 30 TABLET | Refills: 1 | Status: SHIPPED | OUTPATIENT
Start: 2021-12-08 | End: 2022-01-19 | Stop reason: SDUPTHER

## 2021-12-08 NOTE — TELEPHONE ENCOUNTER
Pt needs refill of Montelukast. Enough to last until her appt on 1/25/2022. Based off of what the fax said.

## 2021-12-09 ENCOUNTER — PATIENT OUTREACH (OUTPATIENT)
Dept: OTHER | Age: 61
End: 2021-12-09

## 2021-12-09 RX ORDER — MONTELUKAST SODIUM 10 MG/1
10 TABLET ORAL DAILY
Qty: 30 TABLET | Refills: 2 | Status: SHIPPED | OUTPATIENT
Start: 2021-12-09 | End: 2022-01-19 | Stop reason: SDUPTHER

## 2021-12-09 NOTE — PATIENT INSTRUCTIONS
Hello,  You have received a comment on your case. Please review. HR Case: [id number]   Requester: Cecile Reeder   Subject Person: Layla López   HR Service: [address] [place] [address] Application   State: Ready   Short Description: [address] [place] [address] Application case for Cecile Reeder     Comments:   12- 12:06:52 PM RACIEL - Dave Quinones Additional comments  May[place]ia,     I am reaching out in response to your recent hardship application of $682.56 to help during your financial emergency. After careful review of your application, the Associate Hardship Committee finds your application sound and reasonable. Your application has been approved. These approved funds will be added to an upcoming paycheck. Since these funds are taxable, you will receive additional funds to cover these taxes. It is your responsibility to use these funds to pay the approved invoices and then submit receipt of payment to be considered for any future funds. You can submit the receipt(s) by going to HR Service Now, clicking My Request, open the Hardship case and add the attachments. The Hardship Committee is happy to help with your immediate need but would also like to address your overall financial well-being. Attached to your HR Service Now case is recommended programs and resources to help you use this gift as a steppingstone to long-term financial success. As terms of Committee approval, all associates who have not reached the programs lifetime maximum ($2,600) are asked to complete a financial education course/program prior to any additional funds being allocated in the future. In good health.    Dave Quinones, Well-being & Recognition Consultant    11- 10:43:59 AM RACIEL Reeder Additional comments  User Cecile Reeder has initiated a [address] [place] [address] Application request     Sincerely,  Human Resources  [id number]

## 2021-12-09 NOTE — PROGRESS NOTES
MADISON DOYLE contacted patient for follow-up. Patient identifiers confirmed, zip code and . Purpose of TC  Provide and obtain updates. TC details  MADISON DOYLE received an email stating that patient was approved for the [address] to pay her rent and utilities in the amount of $820. Patient expressed relief during the conversation. MADISON DOYLE explained to patient how the funds would be transferred to her. Ms. Greg Mcfarland expressed an understanding. Ms. Greg Mcfarland informed [facility] that she was approved for SNAP benefits. The patient added that her doctor extended her leave due to additional medical concerns. Her next appointment is scheduled for 22 to determine her ability to return to work. Ms. Greg Mcfarland stated that she submitted additional information to her Absentee Manager. She has not received STD payments yet. Plan of action  MADISON [facility] will follow-up with patient next week.

## 2021-12-13 ENCOUNTER — PATIENT OUTREACH (OUTPATIENT)
Dept: OTHER | Age: 61
End: 2021-12-13

## 2021-12-13 NOTE — PROGRESS NOTES
MADISON DOYLE contacted patient for follow-up. Patient identifiers confirmed, zip code and . Purpose of TC  Follow-up regarding [facility] for our own alexi. TC details  Ms. Margy Aase stated that she did not receive additional funds in her last paycheck. According to HR Case [id number], [facility] the amount of $820. MADISON DOYLE sent an email to Ghazal Suazo (Well being & Recognition Consultant) regarding the status of the funds. MADISON DOYLE will contact Ms. Margy Aase after a response has been received.

## 2021-12-13 NOTE — PROGRESS NOTES
Follow up phone call to patient, two pt identifiers verified. Discussed patient's goals:   Goals        Patient Stated      Patient will apply for financial assistance programs (pt-stated)       Patient will apply for financial assistance programs by 12/1/21        Patient will apply for SNAP benefits (pt-stated)       Patient will apply for SNAP benefits by 12/1/21         Other      Completes all follow-up appointments within 30 days of ED visit        Educate and encourage importance of FU for prevention of complications or disease;   Assess the patient's relationship with a PCP and next FU visit scheduled;  o PCP f/up on 10/19   Discuss importance of adherence to treatment plan and follow up visits;   Identify any barriers in transportation or access to FU appointments.  Assist patient with making FU appointments as needed;    It's also a good idea to know your test results.  Keep a list of the medicines you take. 10/22: Patient had f/up appt with her PCP on 10/19, states her BP was better and she is feeling better. Has Mammogram scheduled for today, 10/22.  11/12: Feeling better, SOB has gotten better with lasix. On heart monitor until Monday, 11/15.  11/19: May have heart cath based on findings through heart monitor, states her cardiologist is attempting to get a date for this procedure. Provided support and encouragement. Next appt with cardiology is on 12/1.  12/13: 1/3/21 heart cath. Continues on lasix, SOB is better.         Demonstrates no return to ED or red flags within 30 days       · Assess patient knowledge of how to contact the provider for questions if needed;  · Educate patient on importance of monitoring for any red flags;  · Review importance of reporting any changes, red flags to the provider and/or PCP;  · Assess patient's knowledge of discharge instructions and any restrictions;  · Educate patient when to call 911;  · Assess for any barriers with safety at home  · Discuss use of nurse access line and location of number on front of insurance card. 10/22: No red flags. 11/12: no red flags  11/19: No red flags. 12/13: No red flags. [facility]  Supportive Resources       · [facility] - # [phone]  · [facility] Angel Alerts System 24/7 (virtual visits 24/7)  · Life Matters # [phone] PW: [facility] for associates  · Nurse Access line 24/7 # [phone]  · Be Well (www.[employer])  · HR Service Now - Iris   · BSM Workday  · IT - 0-470-973-372-660-4051  · Virtual [employer] Help - 1- 900-864-9662  Associate Services for advice and direction, by calling [phone] and pressing     11/12: Patient concerned with financials. 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 Informed her of [facility] Our Own. Patient states she does not have a computer,  would like to apply. Will refer Raymond WALLACE             Patient's primary care provider relationship reviewed with patient and modified, as applicable. Readiness to Change: []  Pre-contemplative    []  Contemplative  []  Preparation               [x]  Action                  []  Maintenance    Barriers/Challenges to Care: []  Decline in memory    []  Language barrier     []  Emotional                  []  Limited mobility  []  Lack of motivation     [] Vision, hearing or cognitive impairment []  Knowledge [] Financial Barriers []  Lack of support  []  Pain []  Other [x]  None    Key pt activities to achieve better health:   [x]  Weight loss  []  Improved diabetic control  []  Decreased cholesterol levels  []  Decreased blood pressure  []    []    Upcoming appointments:   Future Appointments   Date Time Provider Faina Ambrose   1/25/2022 10:00 AM Yesenia Sloan NP [facility]     Plan for next call: Call in two weeks, 12/27.

## 2021-12-15 RX ORDER — ASPIRIN 81 MG/1
81 TABLET ORAL DAILY
COMMUNITY

## 2021-12-17 ENCOUNTER — PATIENT OUTREACH (OUTPATIENT)
Dept: OTHER | Age: 61
End: 2021-12-17

## 2021-12-17 NOTE — PROGRESS NOTES
MADISON DOYLE contacted patient for follow-up. Patient identifiers confirmed, zip code and . Purpose of TC  Follow-up [employer]. TC details  Ms. Erin Jansen stated that she has not received any additional information about the distribution of the funds. Patient was approved for [address] [place] [address] on 21. The last paycheck were distributed on 12/10/21. According to approval email, the funds would be distributed on \"next paycheck. \"  It is likely that 1 business day was not sufficient time to add fund to patient check. MADISON [facility] sent email to Aiyana Lockett (Well-being & Recognition Consultant) on 31 to confirm date of fund deposit. No response as of this time. MADISON DOYLE informed patient that the funds will likely be added to her 21 paycheck. Ms. Erin Jansen stated that she was concerned about her electricity being disconnected. Patient stated that she would call to request an extension. Plan of action  Ms. Erin Jansen will contact MSW [facility] with an update regarding [facility].

## 2021-12-23 ENCOUNTER — PATIENT OUTREACH (OUTPATIENT)
Dept: OTHER | Age: 61
End: 2021-12-23

## 2021-12-23 NOTE — PROGRESS NOTES
MADISON DOYLE received a TC from patient. Patient identifiers confirmed, zip code and . Purpose of call  Provide an update    TC details  MsMegan Pederson stated she received a check $820 from the [address] [place] [address] to cover her rent and utility bill. Patient was thankful to receive the payment. MADISON DOYLE informed patient to submit receipts of payment, per stipulations of alexi. Patient expressed an understanding and thanked MADISON [facility] for assistance. Plan of action  Follow-up with patient in a few weeks.

## 2021-12-27 ENCOUNTER — PATIENT OUTREACH (OUTPATIENT)
Dept: OTHER | Age: 61
End: 2021-12-27

## 2021-12-27 NOTE — PROGRESS NOTES
Attempt to reach patient for follow up. Discreet VM left with contact information. Per chart review, patient is scheduled for left heart cath/coronary angiography on 1/3 with Dr. Erma Stone. Will follow up with her on 1/4.

## 2022-01-03 ENCOUNTER — HOSPITAL ENCOUNTER (OUTPATIENT)
Age: 62
Discharge: HOME | End: 2022-01-03
Attending: STUDENT IN AN ORGANIZED HEALTH CARE EDUCATION/TRAINING PROGRAM | Admitting: STUDENT IN AN ORGANIZED HEALTH CARE EDUCATION/TRAINING PROGRAM
Payer: COMMERCIAL

## 2022-01-03 VITALS
DIASTOLIC BLOOD PRESSURE: 86 MMHG | RESPIRATION RATE: 16 BRPM | TEMPERATURE: 98 F | WEIGHT: 190 LBS | BODY MASS INDEX: 30.53 KG/M2 | SYSTOLIC BLOOD PRESSURE: 149 MMHG | HEART RATE: 68 BPM | HEIGHT: 66 IN | OXYGEN SATURATION: 98 %

## 2022-01-03 DIAGNOSIS — R07.89 OTHER CHEST PAIN: ICD-10-CM

## 2022-01-03 PROBLEM — R07.9 CHEST PAIN: Status: ACTIVE | Noted: 2022-01-03

## 2022-01-03 LAB
ANION GAP SERPL CALC-SCNC: 5 MMOL/L (ref 5–15)
APTT PPP: 26.4 SEC (ref 21.2–34.1)
BUN SERPL-MCNC: 22 MG/DL (ref 6–20)
BUN/CREAT SERPL: 30 (ref 12–20)
CA-I BLD-MCNC: 9.5 MG/DL (ref 8.5–10.1)
CHLORIDE SERPL-SCNC: 109 MMOL/L (ref 97–108)
CO2 SERPL-SCNC: 26 MMOL/L (ref 21–32)
CREAT SERPL-MCNC: 0.74 MG/DL (ref 0.55–1.02)
ERYTHROCYTE [DISTWIDTH] IN BLOOD BY AUTOMATED COUNT: 12.6 % (ref 11.5–14.5)
GLUCOSE SERPL-MCNC: 100 MG/DL (ref 65–100)
HCT VFR BLD AUTO: 42.6 % (ref 35–47)
HGB BLD-MCNC: 12.8 G/DL (ref 11.5–16)
INR PPP: 1.1 (ref 0.9–1.1)
MCH RBC QN AUTO: 27.6 PG (ref 26–34)
MCHC RBC AUTO-ENTMCNC: 30 G/DL (ref 30–36.5)
MCV RBC AUTO: 91.8 FL (ref 80–99)
NRBC # BLD: 0 K/UL (ref 0–0.01)
NRBC BLD-RTO: 0 PER 100 WBC
PLATELET # BLD AUTO: 222 K/UL (ref 150–400)
PMV BLD AUTO: 10 FL (ref 8.9–12.9)
POTASSIUM SERPL-SCNC: 4.1 MMOL/L (ref 3.5–5.1)
PROTHROMBIN TIME: 13.4 SEC (ref 11.9–14.7)
RBC # BLD AUTO: 4.64 M/UL (ref 3.8–5.2)
SODIUM SERPL-SCNC: 140 MMOL/L (ref 136–145)
THERAPEUTIC RANGE,PTTT: NORMAL SEC (ref 82–109)
WBC # BLD AUTO: 5.9 K/UL (ref 3.6–11)

## 2022-01-03 PROCEDURE — 36415 COLL VENOUS BLD VENIPUNCTURE: CPT

## 2022-01-03 PROCEDURE — C1769 GUIDE WIRE: HCPCS | Performed by: STUDENT IN AN ORGANIZED HEALTH CARE EDUCATION/TRAINING PROGRAM

## 2022-01-03 PROCEDURE — 76210000017 HC OR PH I REC 1.5 TO 2 HR: Performed by: STUDENT IN AN ORGANIZED HEALTH CARE EDUCATION/TRAINING PROGRAM

## 2022-01-03 PROCEDURE — 77030040934 HC CATH DIAG DXTERITY MEDT -A: Performed by: STUDENT IN AN ORGANIZED HEALTH CARE EDUCATION/TRAINING PROGRAM

## 2022-01-03 PROCEDURE — 85027 COMPLETE CBC AUTOMATED: CPT

## 2022-01-03 PROCEDURE — 80048 BASIC METABOLIC PNL TOTAL CA: CPT

## 2022-01-03 PROCEDURE — 74011000250 HC RX REV CODE- 250: Performed by: STUDENT IN AN ORGANIZED HEALTH CARE EDUCATION/TRAINING PROGRAM

## 2022-01-03 PROCEDURE — 85610 PROTHROMBIN TIME: CPT

## 2022-01-03 PROCEDURE — 77030016699 HC CATH ANGI DX INFN1 CARD -A: Performed by: STUDENT IN AN ORGANIZED HEALTH CARE EDUCATION/TRAINING PROGRAM

## 2022-01-03 PROCEDURE — 85730 THROMBOPLASTIN TIME PARTIAL: CPT

## 2022-01-03 PROCEDURE — 93458 L HRT ARTERY/VENTRICLE ANGIO: CPT | Performed by: STUDENT IN AN ORGANIZED HEALTH CARE EDUCATION/TRAINING PROGRAM

## 2022-01-03 PROCEDURE — 77030003394 HC NDL ART COOK -A: Performed by: STUDENT IN AN ORGANIZED HEALTH CARE EDUCATION/TRAINING PROGRAM

## 2022-01-03 PROCEDURE — 99153 MOD SED SAME PHYS/QHP EA: CPT | Performed by: STUDENT IN AN ORGANIZED HEALTH CARE EDUCATION/TRAINING PROGRAM

## 2022-01-03 PROCEDURE — C1894 INTRO/SHEATH, NON-LASER: HCPCS | Performed by: STUDENT IN AN ORGANIZED HEALTH CARE EDUCATION/TRAINING PROGRAM

## 2022-01-03 PROCEDURE — 74011250636 HC RX REV CODE- 250/636: Performed by: STUDENT IN AN ORGANIZED HEALTH CARE EDUCATION/TRAINING PROGRAM

## 2022-01-03 PROCEDURE — 76210000026 HC REC RM PH II 1 TO 1.5 HR: Performed by: STUDENT IN AN ORGANIZED HEALTH CARE EDUCATION/TRAINING PROGRAM

## 2022-01-03 PROCEDURE — 77030042317 HC BND COMPR HEMSTAT -B: Performed by: STUDENT IN AN ORGANIZED HEALTH CARE EDUCATION/TRAINING PROGRAM

## 2022-01-03 PROCEDURE — 99152 MOD SED SAME PHYS/QHP 5/>YRS: CPT | Performed by: STUDENT IN AN ORGANIZED HEALTH CARE EDUCATION/TRAINING PROGRAM

## 2022-01-03 RX ORDER — HEPARIN SODIUM 200 [USP'U]/100ML
INJECTION, SOLUTION INTRAVENOUS
Status: COMPLETED | OUTPATIENT
Start: 2022-01-03 | End: 2022-01-03

## 2022-01-03 RX ORDER — MIDAZOLAM HYDROCHLORIDE 1 MG/ML
INJECTION INTRAMUSCULAR; INTRAVENOUS AS NEEDED
Status: DISCONTINUED | OUTPATIENT
Start: 2022-01-03 | End: 2022-01-03 | Stop reason: HOSPADM

## 2022-01-03 RX ORDER — SODIUM CHLORIDE 0.9 % (FLUSH) 0.9 %
5-40 SYRINGE (ML) INJECTION AS NEEDED
Status: DISCONTINUED | OUTPATIENT
Start: 2022-01-03 | End: 2022-01-03 | Stop reason: HOSPADM

## 2022-01-03 RX ORDER — VERAPAMIL HYDROCHLORIDE 2.5 MG/ML
INJECTION, SOLUTION INTRAVENOUS AS NEEDED
Status: DISCONTINUED | OUTPATIENT
Start: 2022-01-03 | End: 2022-01-03 | Stop reason: HOSPADM

## 2022-01-03 RX ORDER — LIDOCAINE HYDROCHLORIDE 10 MG/ML
INJECTION INFILTRATION; PERINEURAL AS NEEDED
Status: DISCONTINUED | OUTPATIENT
Start: 2022-01-03 | End: 2022-01-03 | Stop reason: HOSPADM

## 2022-01-03 RX ORDER — FENTANYL CITRATE 50 UG/ML
INJECTION, SOLUTION INTRAMUSCULAR; INTRAVENOUS AS NEEDED
Status: DISCONTINUED | OUTPATIENT
Start: 2022-01-03 | End: 2022-01-03 | Stop reason: HOSPADM

## 2022-01-03 RX ORDER — SODIUM CHLORIDE 0.9 % (FLUSH) 0.9 %
5-40 SYRINGE (ML) INJECTION EVERY 8 HOURS
Status: DISCONTINUED | OUTPATIENT
Start: 2022-01-03 | End: 2022-01-03 | Stop reason: HOSPADM

## 2022-01-03 RX ORDER — SODIUM CHLORIDE 9 MG/ML
30 INJECTION, SOLUTION INTRAVENOUS CONTINUOUS
Status: DISCONTINUED | OUTPATIENT
Start: 2022-01-03 | End: 2022-01-03 | Stop reason: HOSPADM

## 2022-01-03 RX ORDER — HEPARIN SODIUM 1000 [USP'U]/ML
INJECTION, SOLUTION INTRAVENOUS; SUBCUTANEOUS AS NEEDED
Status: DISCONTINUED | OUTPATIENT
Start: 2022-01-03 | End: 2022-01-03 | Stop reason: HOSPADM

## 2022-01-03 RX ADMIN — SODIUM CHLORIDE 30 ML/HR: 9 INJECTION, SOLUTION INTRAVENOUS at 09:47

## 2022-01-03 NOTE — Clinical Note
TRANSFER - OUT REPORT:     Verbal report given to: Sera (at bedside). Report consisted of patient's Situation, Background, Assessment and   Recommendations(SBAR). Opportunity for questions and clarification was provided. Patient transported with a Registered Nurse. Patient transported to: recovery.

## 2022-01-03 NOTE — PERIOP NOTES
Patient alert and oriented, BP elevated, complains of headache 8/10. Patient little anxious though. Daughter at bedside. Call bell within reach.

## 2022-01-03 NOTE — PROGRESS NOTES
Up ambulated to the bathroom. Tolerated well. Gait slow and steady. Right radial dressing dry and intact. No bleeding or hematoma noted at site. Denies pain or discomfort.

## 2022-01-03 NOTE — ROUTINE PROCESS
TRANSFER - OUT REPORT:    Verbal report given to [facility] RN on General Carrillo  being transferred to [facility] bed 23    Report consisted of patients Situation, Background, Assessment and   Recommendations(SBAR).

## 2022-01-03 NOTE — Clinical Note
TRANSFER - IN REPORT:     Verbal report received from: Ludwig Razo. Report consisted of patient's Situation, Background, Assessment and   Recommendations(SBAR). Opportunity for questions and clarification was provided. Assessment completed upon patient's arrival to unit and care assumed. Patient transported with a Registered Nurse.

## 2022-01-03 NOTE — PROGRESS NOTES
Assumed care of the patient. Alert and responsive. Right radial dressing dry and intact. Radial pulse intact. No bleeding or hematoma noted at site.

## 2022-01-03 NOTE — Clinical Note
Contrast Dose Calculator:   Patient's age: 57.   Patient's sex: Female. Patient weight (kg) = 86. Creatinine level (mg/dL) = 0.74. Creatinine clearance (mL/min): 107. Contrast concentration (mg/mL) = 370. MACD = 300 mL. Max Contrast dose per Creatinine Cl calculator = 240.75 mL.

## 2022-01-04 ENCOUNTER — PATIENT OUTREACH (OUTPATIENT)
Dept: OTHER | Age: 62
End: 2022-01-04

## 2022-01-04 NOTE — PROGRESS NOTES
MSW ECM attempted to contact patient for follow-up. Patient telephone was not working. MSW ECM will call back in a few days.

## 2022-01-05 ENCOUNTER — PATIENT OUTREACH (OUTPATIENT)
Dept: OTHER | Age: 62
End: 2022-01-05

## 2022-01-05 NOTE — PROGRESS NOTES
MADISON DOYLE received message from Andrea Parkinson, Nurse [facility] [place], to contact patient. Patient identifiers confirmed, zip code and . Patient updates  Ms. Jose Glaser stated that she was able to pay identified expenses from the [facility]. The patient stated that she is at a family member's house recovering from her recent procedure. Patient stated that she would return home tomorrow. Ms. Jose Glaser requested assistance submitting receipts to the Caring for [address] [place] [address] committee as confirmation that the bills had been paid. The receipts are at patient home. Plan of action  Ms. Jose Glaser will contact MADISON DOYLE once she has receipts for submission.

## 2022-01-05 NOTE — PROGRESS NOTES
Follow up phone call to patient, two pt identifiers verified. Discussed patient's goals:   Goals        Patient Stated      Patient will apply for financial assistance programs (pt-stated)       Patient will apply for financial assistance programs by 12/1/21        Patient will apply for SNAP benefits (pt-stated)       Patient will apply for SNAP benefits by 12/1/21         Other      Completes all follow-up appointments within 30 days of ED visit        Educate and encourage importance of FU for prevention of complications or disease;   Assess the patient's relationship with a PCP and next FU visit scheduled;  o PCP f/up on 10/19   Discuss importance of adherence to treatment plan and follow up visits;   Identify any barriers in transportation or access to FU appointments.  Assist patient with making FU appointments as needed;    It's also a good idea to know your test results.  Keep a list of the medicines you take. 10/22: Patient had f/up appt with her PCP on 10/19, states her BP was better and she is feeling better. Has Mammogram scheduled for today, 10/22.  11/12: Feeling better, SOB has gotten better with lasix. On heart monitor until Monday, 11/15.  11/19: May have heart cath based on findings through heart monitor, states her cardiologist is attempting to get a date for this procedure. Provided support and encouragement. Next appt with cardiology is on 12/1.  12/13: 1/3/21 heart cath. Continues on lasix, SOB is better. 1/5: Heart cath was negative, SOB with exertion. Dr. Elias Carpenter appt on 1/25, Dr. Glenny Greer 1/21.           Demonstrates no return to ED or red flags within 30 days       · Assess patient knowledge of how to contact the provider for questions if needed;  · Educate patient on importance of monitoring for any red flags;  · Review importance of reporting any changes, red flags to the provider and/or PCP;  · Assess patient's knowledge of discharge instructions and any restrictions;  · Educate patient when to call 911;  · Assess for any barriers with safety at home  · Discuss use of nurse access line and location of number on front of insurance card. 10/22: No red flags. 11/12: no red flags  11/19: No red flags. 12/13: No red flags. 1/5: No red flags. [place]  Supportive Resources       · [facility] - # [phone]  · [employer] 24/7 (virtual visits 24/7)  · Life Matters # [phone] PW: [facility] for associates  · Nurse Access line 24/7 # [phone]  · Be Well (www.[employer]o)  · HR Service Now - Iris   · BSM Workday  · IT - 3-449-673-551-086-8887  · MyChart Help - 1- [phone]  Associate Services for advice and direction, by calling [phone] and pressing     11/12: Patient concerned with financials. 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 Patient states she does not have a computer,  would like to apply. Will refer Linda Scott MSW             Patient's primary care provider relationship reviewed with patient and modified, as applicable. Readiness to Change: []  Pre-contemplative    []  Contemplative  []  Preparation               [x]  Action                  []  Maintenance    Barriers/Challenges to Care: []  Decline in memory    []  Language barrier     []  Emotional                  []  Limited mobility  []  Lack of motivation     [] Vision, hearing or cognitive impairment []  Knowledge [] Financial Barriers []  Lack of support  []  Pain []  Other [x]  None    Key pt activities to achieve better health:   []  Weight loss  []  Improved diabetic control  []  Decreased cholesterol levels  []  Decreased blood pressure  [x]  Continue recommendations per cardiology  []    Upcoming appointments:   Future Appointments   Date Time Provider Faina Ambrose   1/25/2022 10:00 AM Bridgett Tabares NP [facility] BS AMB     Plan for next call:  Call back in three weeks, 1/26.

## 2022-01-11 ENCOUNTER — PATIENT OUTREACH (OUTPATIENT)
Dept: OTHER | Age: 62
End: 2022-01-11

## 2022-01-19 ENCOUNTER — TELEPHONE (OUTPATIENT)
Dept: FAMILY MEDICINE CLINIC | Age: 62
End: 2022-01-19

## 2022-01-19 DIAGNOSIS — Z76.0 MEDICATION REFILL: ICD-10-CM

## 2022-01-19 RX ORDER — MONTELUKAST SODIUM 10 MG/1
10 TABLET ORAL DAILY
Qty: 90 TABLET | Refills: 1 | Status: SHIPPED | OUTPATIENT
Start: 2022-01-19

## 2022-01-19 RX ORDER — FUROSEMIDE 40 MG/1
40 TABLET ORAL DAILY
Qty: 90 TABLET | Refills: 1 | Status: SHIPPED | OUTPATIENT
Start: 2022-01-19 | End: 2022-08-26 | Stop reason: SDUPTHER

## 2022-01-19 RX ORDER — LANOLIN ALCOHOL/MO/W.PET/CERES
CREAM (GRAM) TOPICAL
Qty: 90 TABLET | Refills: 1 | Status: SHIPPED | OUTPATIENT
Start: 2022-01-19

## 2022-01-19 RX ORDER — AMLODIPINE BESYLATE 10 MG/1
10 TABLET ORAL DAILY
Qty: 90 TABLET | Refills: 1 | Status: SHIPPED | OUTPATIENT
Start: 2022-01-19 | End: 2022-07-28

## 2022-01-19 RX ORDER — CARVEDILOL 25 MG/1
25 TABLET ORAL 2 TIMES DAILY WITH MEALS
Qty: 180 TABLET | Refills: 1 | Status: SHIPPED | OUTPATIENT
Start: 2022-01-19

## 2022-01-19 RX ORDER — LOSARTAN POTASSIUM 100 MG/1
100 TABLET ORAL DAILY
Qty: 90 TABLET | Refills: 1 | Status: SHIPPED | OUTPATIENT
Start: 2022-01-19 | End: 2022-07-28

## 2022-01-19 RX ORDER — ATORVASTATIN CALCIUM 40 MG/1
40 TABLET, FILM COATED ORAL DAILY
Qty: 90 TABLET | Refills: 1 | Status: SHIPPED | OUTPATIENT
Start: 2022-01-19

## 2022-01-19 RX ORDER — POTASSIUM CHLORIDE 20 MEQ/1
20 TABLET, EXTENDED RELEASE ORAL DAILY
Qty: 90 TABLET | Refills: 1 | Status: SHIPPED | OUTPATIENT
Start: 2022-01-19 | End: 2022-07-28 | Stop reason: ALTCHOICE

## 2022-01-19 RX ORDER — FLUTICASONE PROPIONATE 50 MCG
2 SPRAY, SUSPENSION (ML) NASAL DAILY
Qty: 1 EACH | Refills: 3 | Status: SHIPPED | OUTPATIENT
Start: 2022-01-19

## 2022-01-19 NOTE — TELEPHONE ENCOUNTER
Pt said that she needs new Rxs of her medications sent to [facility].

## 2022-01-25 ENCOUNTER — VIRTUAL VISIT (OUTPATIENT)
Dept: FAMILY MEDICINE CLINIC | Age: 62
End: 2022-01-25
Payer: COMMERCIAL

## 2022-01-25 DIAGNOSIS — I10 ESSENTIAL HYPERTENSION: Primary | ICD-10-CM

## 2022-01-25 PROCEDURE — 99442 PR PHYS/QHP TELEPHONE EVALUATION 11-20 MIN: CPT | Performed by: NURSE PRACTITIONER

## 2022-01-25 NOTE — PROGRESS NOTES
Cassy Markus presents today for   Chief Complaint   Patient presents with    Follow-up     3 month followup        Virtual/telephone visit    Depression Screening:  3 most recent PHQ Screens 1/25/2022   Little interest or pleasure in doing things Not at all   Feeling down, depressed, irritable, or hopeless Not at all   Total Score PHQ 2 0       Learning Assessment:  Learning Assessment 9/1/2020   PRIMARY LEARNER Patient   HIGHEST LEVEL OF EDUCATION - PRIMARY LEARNER  SOME COLLEGE   BARRIERS PRIMARY LEARNER NONE   CO-LEARNER CAREGIVER No   PRIMARY LANGUAGE ENGLISH   LEARNER PREFERENCE PRIMARY LISTENING   ANSWERED BY patient   RELATIONSHIP SELF       Abuse Screening:  Abuse Screening Questionnaire 1/25/2022   Do you ever feel afraid of your partner? N   Are you in a relationship with someone who physically or mentally threatens you? N   Is it safe for you to go home? Y       Fall Risk  Fall Risk Assessment, last 12 mths 8/24/2020   Able to walk? Yes   Fall in past 12 months? No       ADL  ADL Assessment 1/25/2022   Feeding yourself No Help Needed   Getting from bed to chair No Help Needed   Getting dressed No Help Needed   Bathing or showering No Help Needed   Walk across the room (includes cane/walker) No Help Needed   Using the telphone No Help Needed   Taking your medications No Help Needed   Preparing meals No Help Needed   Managing money (expenses/bills) No Help Needed   Moderately strenuous housework (laundry) No Help Needed   Shopping for personal items (toiletries/medicines) No Help Needed   Shopping for groceries No Help Needed   Driving No Help Needed   Climbing a flight of stairs No Help Needed   Getting to places beyond walking distances No Help Needed       Health Maintenance reviewed and discussed and ordered per Provider.     Health Maintenance Due   Topic Date Due    Cervical cancer screen  Never done    DTaP/Tdap/Td series (1 - Tdap) 01/02/2008    Shingrix Vaccine Age 50> (1 of 2) Never done .      Coordination of Care:  1. Have you been to the ER, urgent care clinic since your last visit? Hospitalized since your last visit? no    2. Have you seen or consulted any other health care providers outside of the [address] [place] [address] since your last visit? Include any pap smears or colon screening.  no

## 2022-01-26 ENCOUNTER — PATIENT OUTREACH (OUTPATIENT)
Dept: OTHER | Age: 62
End: 2022-01-26

## 2022-01-26 NOTE — PROGRESS NOTES
Follow up phone call to patient, two pt identifiers verified. Discussed patient's goals:   Goals        Patient Stated      Patient will apply for financial assistance programs (pt-stated)       Patient will apply for financial assistance programs by 12/1/21        Patient will apply for SNAP benefits (pt-stated)       Patient will apply for SNAP benefits by 12/1/21         Other      Completes all follow-up appointments within 30 days of ED visit        Educate and encourage importance of FU for prevention of complications or disease;   Assess the patient's relationship with a PCP and next FU visit scheduled;  o PCP f/up on 10/19   Discuss importance of adherence to treatment plan and follow up visits;   Identify any barriers in transportation or access to FU appointments.  Assist patient with making FU appointments as needed;    It's also a good idea to know your test results.  Keep a list of the medicines you take. 10/22: Patient had f/up appt with her PCP on 10/19, states her BP was better and she is feeling better. Has Mammogram scheduled for today, 10/22.  11/12: Feeling better, SOB has gotten better with lasix. On heart monitor until Monday, 11/15.  11/19: May have heart cath based on findings through heart monitor, states her cardiologist is attempting to get a date for this procedure. Provided support and encouragement. Next appt with cardiology is on 12/1.  12/13: 1/3/21 heart cath. Continues on lasix, SOB is better. 1/5: Heart cath was negative, SOB with exertion. Dr. Bonnie Bhat appt on 1/25, Dr. Jackson Canales 1/21.    1/26: Dr. Jackson Canales 1/28. Had follow up with Dr. Bonnie Bhat on 1/25.         Demonstrates no return to ED or red flags within 30 days       · Assess patient knowledge of how to contact the provider for questions if needed;  · Educate patient on importance of monitoring for any red flags;  · Review importance of reporting any changes, red flags to the provider and/or PCP;  · Assess patient's knowledge of discharge instructions and any restrictions;  · Educate patient when to call 911;  · Assess for any barriers with safety at home  · Discuss use of nurse access line and location of number on front of insurance card. 10/22: No red flags. 11/12: no red flags  11/19: No red flags. 12/13: No red flags. 1/5: No red flags. 1/26: No red flags. Kevin McLennan  Supportive Resources       · [employer] - # [phone]  · [employer] 24/7 (virtual visits 24/7)  · Life Matters # [phone] PW: [facility] for associates  · Nurse Access line 24/7 # [phone]  · Be Well (www."[employer]")  · HR Service Now - Iris   · BSM Workday  · IT - 9-989-665-923-462-0970  · [employer] Help - 1- 952-715-1106  Associate Services for advice and direction, by calling [phone] and pressing     11/12: Patient concerned with financials. 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 Patient states she does not have a computer,  would like to apply. Will refer Key Batres MSW             Patient's primary care provider relationship reviewed with patient and modified, as applicable. Patient states she is doing better. Still has some arm pain from the cath. Suggested Tylenol and for her to discuss with cardiologist on 1/28 during her appointment. No needs at this time. Readiness to Change: []  Pre-contemplative    []  Contemplative  []  Preparation               [x]  Action                  []  Maintenance    Barriers/Challenges to Care: []  Decline in memory    []  Language barrier     []  Emotional                  []  Limited mobility  []  Lack of motivation     [] Vision, hearing or cognitive impairment []  Knowledge [] Financial Barriers []  Lack of support  []  Pain []  Other [x]  None    Upcoming appointments:   Future Appointments   Date Time Provider Faina Ambrose   7/28/2022  9:00 AM Anabel Win NP [facility] BS AMB     Plan for next call: Call in one week, 2/2.

## 2022-02-01 NOTE — PROGRESS NOTES
Pursuant to the emergency declaration under the [facility] waiver authority and the [employer] Act, this phone visit was conducted, with patient's consent, to reduce the patient's risk of exposure to COVID-19 and provide continuity of care for an established patient. She and/or health care decision maker is aware that that she may receive a bill for this telephone service, depending on her insurance coverage, and has provided verbal consent to proceed. This is a Patient Initiated Episode with an Established Patient who has not had a related appointment within my department in the past 7 days or scheduled within the next 24 hours. HISTORY OF PRESENTING ILLNESS      Tamika Martinez is a 57 y.o. female evaluated via telephone on 2022 due to COVID 19 restrictions. Patient unable to participate in Virtual Visit with synchronous audio/visual technology. Patient reports today for blood pressure follow-up. She states blood pressures of 130s over 80s to 90s.   PCP Provider  Cheryle Harlem, NP  Past Medical History:   Diagnosis Date    Calculus of kidney     Chest pain     pt states occ since oct 2021    Congestive heart failure (HCC)     Dyspnea     pt states on exertion since oct 2021    Essential hypertension     Goiter     Hx of seasonal allergies     Ill-defined condition     pt stated hx of weak heart valve    Menopause       Past Surgical History:   Procedure Laterality Date    HX  SECTION      HX COLONOSCOPY  2019    HX HEART CATHETERIZATION      pt states 10 years ago, and no stents needed    HX HEART CATHETERIZATION  2022    HX TUBAL LIGATION      IR FINE NEEDLE ASPIRATION W IMAGE       Allergies   Allergen Reactions    Lisinopril Cough     Patient is still taking medication     Nitroglycerin Other (comments) Headaches with patch only. Can take sublingual without any difficulty      Family History   Problem Relation Age of Onset    Hypertension Mother     Heart Surgery Mother     Thyroid Disease Mother     Breast Cancer Mother 61    Thyroid Disease Sister     Breast Cancer Maternal Aunt     Breast Cancer Maternal Aunt     Breast Cancer Cousin     No Known Problems Father       Current Outpatient Medications   Medication Sig    amLODIPine (NORVASC) 10 mg tablet Take 1 Tablet by mouth daily.  atorvastatin (LIPITOR) 40 mg tablet Take 1 Tablet by mouth daily.  carvediloL (COREG) 25 mg tablet Take 1 Tablet by mouth two (2) times daily (with meals).  fluticasone propionate (Flonase Allergy Relief) 50 mcg/actuation nasal spray 2 Sprays by Both Nostrils route daily.  furosemide (Lasix) 40 mg tablet Take 1 Tablet by mouth daily.  losartan (COZAAR) 100 mg tablet Take 1 Tablet by mouth daily. Indications: chronic heart failure    potassium chloride (K-DUR, KLOR-CON M20) 20 mEq tablet Take 1 Tablet by mouth daily.  ferrous sulfate 325 mg (65 mg iron) tablet TAKE 1 TABLET BY MOUTH ONCE DAILY WITH  VITAMIN  C    montelukast (Singulair) 10 mg tablet Take 1 Tablet by mouth daily.  aspirin delayed-release 81 mg tablet Take 81 mg by mouth daily.  ondansetron (ZOFRAN ODT) 8 mg disintegrating tablet Take 1 Tab by mouth every eight (8) hours as needed for Nausea or Vomiting.  ascorbic acid, vitamin C, (Vitamin C) 500 mg tablet Take 500 mg by mouth.  cyanocobalamin 1,000 mcg tablet Take 1,000 mcg by mouth every seven (7) days. No current facility-administered medications for this visit. There were no vitals filed for this visit.   Social History     Socioeconomic History    Marital status: SINGLE     Spouse name: Not on file    Number of children: Not on file    Years of education: Not on file    Highest education level: Not on file   Occupational History    Not on file   Tobacco Use    Smoking status: Never Smoker    Smokeless tobacco: Never Used   Vaping Use    Vaping Use: Never used   Substance and Sexual Activity    Alcohol use: Not Currently    Drug use: Never    Sexual activity: Not on file   Other Topics Concern    Not on file   Social History Narrative    Not on file     Social Determinants of Health     Financial Resource Strain:     Difficulty of Paying Living Expenses: Not on file   Food Insecurity:     Worried About Running Out of Food in the Last Year: Not on file    Wil of Food in the Last Year: Not on file   Transportation Needs:     Lack of Transportation (Medical): Not on file    Lack of Transportation (Non-Medical): Not on file   Physical Activity:     Days of Exercise per Week: Not on file    Minutes of Exercise per Session: Not on file   Stress:     Feeling of Stress : Not on file   Social Connections:     Frequency of Communication with Friends and Family: Not on file    Frequency of Social Gatherings with Friends and Family: Not on file    Attends Protestant Services: Not on file    Active Member of [address] [place] [address] or Organizations: Not on file    Attends Club or Organization Meetings: Not on file    Marital Status: Not on file   Intimate Partner Violence:     Fear of Current or Ex-Partner: Not on file    Emotionally Abused: Not on file    Physically Abused: Not on file    Sexually Abused: Not on file   Housing Stability:     Unable to Pay for Housing in the Last Year: Not on file    Number of [place] in the Last Year: Not on file    Unstable Housing in the Last Year: Not on file       MEDICATIONS     Current Outpatient Medications   Medication Sig    amLODIPine (NORVASC) 10 mg tablet Take 1 Tablet by mouth daily.  atorvastatin (LIPITOR) 40 mg tablet Take 1 Tablet by mouth daily.  carvediloL (COREG) 25 mg tablet Take 1 Tablet by mouth two (2) times daily (with meals).     fluticasone propionate (Flonase Allergy Relief) 50 mcg/actuation nasal spray 2 Sprays by Both Nostrils route daily.  furosemide (Lasix) 40 mg tablet Take 1 Tablet by mouth daily.  losartan (COZAAR) 100 mg tablet Take 1 Tablet by mouth daily. Indications: chronic heart failure    potassium chloride (K-DUR, KLOR-CON M20) 20 mEq tablet Take 1 Tablet by mouth daily.  ferrous sulfate 325 mg (65 mg iron) tablet TAKE 1 TABLET BY MOUTH ONCE DAILY WITH  VITAMIN  C    montelukast (Singulair) 10 mg tablet Take 1 Tablet by mouth daily.  aspirin delayed-release 81 mg tablet Take 81 mg by mouth daily.  ondansetron (ZOFRAN ODT) 8 mg disintegrating tablet Take 1 Tab by mouth every eight (8) hours as needed for Nausea or Vomiting.  ascorbic acid, vitamin C, (Vitamin C) 500 mg tablet Take 500 mg by mouth.  cyanocobalamin 1,000 mcg tablet Take 1,000 mcg by mouth every seven (7) days. No current facility-administered medications for this visit. I have reviewed the nurses notes, vitals, problem list, allergy list, medical history, family, social history and medications. REVIEW OF SYMPTOMS     General: Pt denies excessive weight gain or loss. Pt is able to conduct ADL's         PHYSICAL EXAM       Due to this being a telephone encounter a very limited exam was performed  Neurological: A&Ox3, no slurred speech, answering questions appropriately  Respiratory: Non labored, talking in complete sentences, no audible wheeze over the phone        ASSESSMENT        Diagnoses and all orders for this visit:    1. Essential hypertension        ICD-10-CM ICD-9-CM    1. Essential hypertension  I10 401.9      No orders of the defined types were placed in this encounter. The current medical regimen is effective;  continue present plan and medications. PLAN       TIME 11 (Minutes) SPENT RELATED TO THIS PHONE ENCOUNTER    We discussed the expected course, resolution and complications of the diagnosis(es) in detail.   Medication risks, benefits, costs, interactions, and alternatives were discussed as indicated. I advised her to contact the office if her condition worsens, changes or fails to improve as anticipated.  She expressed understanding with the diagnosis(es) and plan

## 2022-02-02 ENCOUNTER — PATIENT OUTREACH (OUTPATIENT)
Dept: OTHER | Age: 62
End: 2022-02-02

## 2022-02-02 NOTE — PROGRESS NOTES
Follow up phone call to patient, two pt identifiers verified. Discussed patient's goals:   Goals        Patient Stated      Patient will apply for financial assistance programs (pt-stated)       Patient will apply for financial assistance programs by 12/1/21        Patient will apply for SNAP benefits (pt-stated)       Patient will apply for SNAP benefits by 12/1/21         Other      Completes all follow-up appointments within 30 days of ED visit        Educate and encourage importance of FU for prevention of complications or disease;   Assess the patient's relationship with a PCP and next FU visit scheduled;  o PCP f/up on 10/19   Discuss importance of adherence to treatment plan and follow up visits;   Identify any barriers in transportation or access to FU appointments.  Assist patient with making FU appointments as needed;    It's also a good idea to know your test results.  Keep a list of the medicines you take. 10/22: Patient had f/up appt with her PCP on 10/19, states her BP was better and she is feeling better. Has Mammogram scheduled for today, 10/22.  11/12: Feeling better, SOB has gotten better with lasix. On heart monitor until Monday, 11/15.  11/19: May have heart cath based on findings through heart monitor, states her cardiologist is attempting to get a date for this procedure. Provided support and encouragement. Next appt with cardiology is on 12/1.  12/13: 1/3/21 heart cath. Continues on lasix, SOB is better. 1/5: Heart cath was negative, SOB with exertion. Dr. Cielo Soria appt on 1/25, Dr. Harriett García 1/21.    1/26: Dr. Harriett García 1/28. Had follow up with Dr. Cielo Soria on 1/25. 2/2: Patient states her HR and BP elevated. BP checks every two weeks. Nex appointment with Dr. Harriett García, cardiology is on 4/1. Started on Jacqualin Jolie. Cardiologist wants her to remain out of work to see how she does on this medication. Encouraged patient to get paperwork in to [facility] as soon as possible.        Demonstrates no return to ED or red flags within 30 days       · Assess patient knowledge of how to contact the provider for questions if needed;  · Educate patient on importance of monitoring for any red flags;  · Review importance of reporting any changes, red flags to the provider and/or PCP;  · Assess patient's knowledge of discharge instructions and any restrictions;  · Educate patient when to call 911;  · Assess for any barriers with safety at home  · Discuss use of nurse access line and location of number on front of insurance card. 10/22: No red flags. 11/12: no red flags  11/19: No red flags. 12/13: No red flags. 1/5: No red flags. 1/26: No red flags. 2/2: No red flags. [facility]  Supportive Resources       · [facility] - # [phone]  · [address] 24/7 (virtual visits 24/7)  · [employer] Matters # [phone] PW: [facility] for associates  · Nurse Access line 24/7 # [phone]  · Be Well (www.[employer].Net)  · HR Service Now - [place]ris   · [employer] Workday  · IT - 9-280-278-7191  · MyChart Help - 1- [phone]  Associate Services for advice and direction, by calling [phone] and pressing     11/12: Patient concerned with financials. 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 Informed her of [employer]. Patient states she does not have a computer,  would like to apply. Will refer Kalani Nicole MSW             Patient's primary care provider relationship reviewed with patient and modified, as applicable.     Readiness to Change: []  Pre-contemplative    []  Contemplative  []  Preparation               [x]  Action                  []  Maintenance    Barriers/Challenges to Care: []  Decline in memory    []  Language barrier     []  Emotional                  []  Limited mobility  []  Lack of motivation     [] Vision, hearing or cognitive impairment []  Knowledge [] Financial Barriers []  Lack of support  []  Pain []  Other [x]  None    Key pt activities to achieve better health:   []  Weight loss  [] Improved diabetic control  []  Decreased cholesterol levels  [x]  Decreased blood pressure  []    []    Upcoming appointments:   Future Appointments   Date Time Provider Faina Ambrose   7/28/2022  9:00 AM Glorious Duty, NP [facility] BS AMB     Plan for next call: Call in three weeks, 2/23.

## 2022-02-23 ENCOUNTER — PATIENT OUTREACH (OUTPATIENT)
Dept: OTHER | Age: 62
End: 2022-02-23

## 2022-02-23 NOTE — PROGRESS NOTES
Follow up phone call to patient, two pt identifiers verified. Discussed patient's goals:   Goals        Patient Stated      Patient will apply for financial assistance programs (pt-stated)       Patient will apply for financial assistance programs by 12/1/21        Patient will apply for SNAP benefits (pt-stated)       Patient will apply for SNAP benefits by 12/1/21         Other      Completes all follow-up appointments within 30 days of ED visit        Educate and encourage importance of FU for prevention of complications or disease;   Assess the patient's relationship with a PCP and next FU visit scheduled;  o PCP f/up on 10/19   Discuss importance of adherence to treatment plan and follow up visits;   Identify any barriers in transportation or access to FU appointments.  Assist patient with making FU appointments as needed;    It's also a good idea to know your test results.  Keep a list of the medicines you take. 10/22: Patient had f/up appt with her PCP on 10/19, states her BP was better and she is feeling better. Has Mammogram scheduled for today, 10/22.  11/12: Feeling better, SOB has gotten better with lasix. On heart monitor until Monday, 11/15.  11/19: May have heart cath based on findings through heart monitor, states her cardiologist is attempting to get a date for this procedure. Provided support and encouragement. Next appt with cardiology is on 12/1.  12/13: 1/3/21 heart cath. Continues on lasix, SOB is better. 1/5: Heart cath was negative, SOB with exertion. Dr. Antony Chávez appt on 1/25, Dr. Jovan Tao 1/21.    1/26: Dr. Jovan Tao 1/28. Had follow up with Dr. Antony Chávez on 1/25. 2/2: Patient states her HR and BP elevated. BP checks every two weeks. Nex appointment with Dr. Jovan Tao, cardiology is on 4/1. Started on [place]. Cardiologist wants her to remain out of work to see how she does on this medication. Encouraged patient to get paperwork in to [facility] as soon as possible.   2/23: F/up on 4/1 with Dr. Hugo Marrero. Susan Merlos is making her sleepy. BP is good, 130/80 on today. States she needs to contact Dr. Julisa Orellana office for STD paperwork. Encouraged her to do this so she does not miss getting paid. Also discussed transition to long term disability after 6 months of STD and job can be advertised.   Demonstrates no return to ED or red flags within 30 days       · Assess patient knowledge of how to contact the provider for questions if needed;  · Educate patient on importance of monitoring for any red flags;  · Review importance of reporting any changes, red flags to the provider and/or PCP;  · Assess patient's knowledge of discharge instructions and any restrictions;  · Educate patient when to call 911;  · Assess for any barriers with safety at home  · Discuss use of nurse access line and location of number on front of insurance card. 10/22: No red flags. 11/12: no red flags  11/19: No red flags. 12/13: No red flags. 1/5: No red flags. 1/26: No red flags. 2/2: No red flags. 2/23: No red flags. [employer]  Supportive Resources       · [employer] [facility] - # [phone]  · [facility] 24/7 (virtual visits 24/7)  · Life Matters # [phone] PW: [facility] for associates  · Nurse Access line 24/7 # [phone]  · Be Well (www.[email or web address])  · HR Service Now - Iris   · [facility] Workday  · IT - [phone]  · MyChart Help - 1- [phone]  Associate Services for advice and direction, by calling [phone] and pressing     11/12: Patient concerned with financials. 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 Patient states she does not have a computer,  would like to apply. Will refer Cass Mustafa MSW             Patient's primary care provider relationship reviewed with patient and modified, as applicable.       Readiness to Change: []  Pre-contemplative    []  Contemplative  []  Preparation               [x]  Action                  [] Maintenance    Barriers/Challenges to Care: []  Decline in memory    []  Language barrier     []  Emotional                  []  Limited mobility  []  Lack of motivation     [] Vision, hearing or cognitive impairment []  Knowledge [] Financial Barriers []  Lack of support  []  Pain []  Other [x]  None    Key pt activities to achieve better health:   []  Weight loss  []  Improved diabetic control  []  Decreased cholesterol levels  [x]  Decreased blood pressure  []    []    Upcoming appointments:   Future Appointments   Date Time Provider Faina Ambrose   7/28/2022  9:00 AM Cheryle Harlem, NP [facility] BS AMB     Plan for next call: Call in three weeks, 3/16.

## 2022-03-16 ENCOUNTER — PATIENT OUTREACH (OUTPATIENT)
Dept: OTHER | Age: 62
End: 2022-03-16

## 2022-03-16 NOTE — PROGRESS NOTES
Attempt to reach patient for follow up. Unable to leave VM, call could not be completed. Will attempt another outreach in two weeks, 3/30.

## 2022-03-18 PROBLEM — E78.5 DYSLIPIDEMIA: Status: ACTIVE | Noted: 2020-06-01

## 2022-03-19 PROBLEM — I50.9 ACUTE EXACERBATION OF CHF (CONGESTIVE HEART FAILURE) (HCC): Status: ACTIVE | Noted: 2021-10-06

## 2022-03-19 PROBLEM — R07.9 CHEST PAIN: Status: ACTIVE | Noted: 2022-01-03

## 2022-03-19 PROBLEM — N20.1 CALCULUS OF DISTAL RIGHT URETER: Status: ACTIVE | Noted: 2020-09-03

## 2022-03-19 PROBLEM — I16.1 HYPERTENSIVE EMERGENCY: Status: ACTIVE | Noted: 2021-10-06

## 2022-03-19 PROBLEM — I42.9 CARDIOMYOPATHY (HCC): Status: ACTIVE | Noted: 2020-08-24

## 2022-03-19 PROBLEM — R10.9 RIGHT FLANK PAIN: Status: ACTIVE | Noted: 2020-09-01

## 2022-03-19 PROBLEM — I50.9 CONGESTIVE HEART FAILURE (HCC): Status: ACTIVE | Noted: 2020-06-01

## 2022-03-19 PROBLEM — N20.0 CALCULUS OF RIGHT KIDNEY: Status: ACTIVE | Noted: 2020-09-01

## 2022-03-19 PROBLEM — I10 ESSENTIAL HYPERTENSION: Status: ACTIVE | Noted: 2020-06-01

## 2022-03-30 ENCOUNTER — PATIENT OUTREACH (OUTPATIENT)
Dept: OTHER | Age: 62
End: 2022-03-30

## 2022-03-30 NOTE — PROGRESS NOTES
Follow up phone call to patient, two pt identifiers verified. Discussed patient's goals:   Goals        Patient Stated      Patient will apply for financial assistance programs (pt-stated)       Patient will apply for financial assistance programs by 12/1/21        Patient will apply for SNAP benefits (pt-stated)       Patient will apply for SNAP benefits by 12/1/21         Other      Completes all follow-up appointments within 30 days of ED visit        Educate and encourage importance of FU for prevention of complications or disease;   Assess the patient's relationship with a PCP and next FU visit scheduled;  o PCP f/up on 10/19   Discuss importance of adherence to treatment plan and follow up visits;   Identify any barriers in transportation or access to FU appointments.  Assist patient with making FU appointments as needed;    It's also a good idea to know your test results.  Keep a list of the medicines you take. 10/22: Patient had f/up appt with her PCP on 10/19, states her BP was better and she is feeling better. Has Mammogram scheduled for today, 10/22.  11/12: Feeling better, SOB has gotten better with lasix. On heart monitor until Monday, 11/15.  11/19: May have heart cath based on findings through heart monitor, states her cardiologist is attempting to get a date for this procedure. Provided support and encouragement. Next appt with cardiology is on 12/1.  12/13: 1/3/21 heart cath. Continues on lasix, SOB is better. 1/5: Heart cath was negative, SOB with exertion. Dr. Natalya Issa appt on 1/25, Dr. Jacinta Dancer 1/21.    1/26: Dr. Jacinta Dancer 1/28. Had follow up with Dr. Natalya Issa on 1/25. 2/2: Patient states her HR and BP elevated. BP checks every two weeks. Nex appointment with Dr. Jacinta Dancer, cardiology is on 4/1. Started on [place]. Cardiologist wants her to remain out of work to see how she does on this medication. Encouraged patient to get paperwork in to [facility] as soon as possible.   2/23: F/up on 4/1 with Dr. Hugo Marrero. Susan Merlos is making her sleepy. BP is good, 130/80 on today. States she needs to contact Dr. Julisa Orellana office for STD paperwork. Encouraged her to do this so she does not miss getting paid. Also discussed transition to long term disability after 6 months of STD and job can be advertised. 3/30: Patient has appointment with Dr. Hugo Marrero on 4/1. Discussed effects of hypertension on vital organs, such as kidneys. Encouraged patient to eat healthy diet, discussed citrus fruits, bananas, low sodium.   Demonstrates no return to ED or red flags within 30 days       · Assess patient knowledge of how to contact the provider for questions if needed;  · Educate patient on importance of monitoring for any red flags;  · Review importance of reporting any changes, red flags to the provider and/or PCP;  · Assess patient's knowledge of discharge instructions and any restrictions;  · Educate patient when to call 911;  · Assess for any barriers with safety at home  · Discuss use of nurse access line and location of number on front of insurance card. 10/22: No red flags. 11/12: no red flags  11/19: No red flags. 12/13: No red flags. 1/5: No red flags. 1/26: No red flags. 2/2: No red flags. 2/23: No red flags. Feels entreso is making her tired, suggested follow up call with cardiology to ask if there is another medication she could try.  3/30: No red flags. Still taking entresto. BP has been 150/90s. She is going once a week to have BP checks at Dr. Julisa Orellana office.       [employer]  Supportive Resources       · [employer] - # [phone]  · [facility] [employer] 24/7 (virtual visits 24/7)  · Life Matters # [phone] PW: [facility] for associates  · Nurse Access line 24/7 # [phone]  · Be Well (www.[email or web address])  · HR Service Now - Iris   · [employer] Workday  · IT - [phone]  · MyChart Help - 1- [phone]  Associate Services for advice and direction, by calling [phone] and pressing     11/12: Patient concerned with financials. 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 Patient states she does not have a computer,  would like to apply. Will refer Disha WALLACE             Patient's primary care provider relationship reviewed with patient and modified, as applicable. Readiness to Change: []  Pre-contemplative    []  Contemplative  []  Preparation               [x]  Action                  []  Maintenance    Barriers/Challenges to Care: []  Decline in memory    []  Language barrier     []  Emotional                  []  Limited mobility  []  Lack of motivation     [] Vision, hearing or cognitive impairment []  Knowledge [x] Financial Barriers []  Lack of support  []  Pain []  Other []  None  Patient states she will be transitioning to long term disability. Had questions about social security disability. Will ask Quita to reach out to patient to discuss options. Key pt activities to achieve better health:   []  Weight loss  []  Improved diabetic control  []  Decreased cholesterol levels  [x]  Decreased blood pressure  []    []    Upcoming appointments:   Future Appointments   Date Time Provider Faina Ambrose   7/28/2022  9:00 AM Mauricio Patton NP [facility] BS AMB     Plan for next call: Call in one week, 4/6.

## 2022-03-31 ENCOUNTER — PATIENT OUTREACH (OUTPATIENT)
Dept: OTHER | Age: 62
End: 2022-03-31

## 2022-03-31 NOTE — PROGRESS NOTES
MSW ECM attempted to contact patient as requested by Nurse ECM, Usha Hancock. No answer. VM left. MSW ECM will call again next week if call is not returned.      Purpose of call  [facility] and financial resources

## 2022-04-01 ENCOUNTER — PATIENT OUTREACH (OUTPATIENT)
Dept: OTHER | Age: 62
End: 2022-04-01

## 2022-04-04 ENCOUNTER — TRANSCRIBE ORDER (OUTPATIENT)
Dept: REGISTRATION | Age: 62
End: 2022-04-04

## 2022-04-04 ENCOUNTER — HOSPITAL ENCOUNTER (OUTPATIENT)
Dept: LAB | Age: 62
Discharge: HOME | End: 2022-04-04
Payer: COMMERCIAL

## 2022-04-04 DIAGNOSIS — E87.6 HYPOKALEMIA: ICD-10-CM

## 2022-04-04 DIAGNOSIS — E87.6 HYPOKALEMIA: Primary | ICD-10-CM

## 2022-04-04 LAB
ANION GAP SERPL CALC-SCNC: 8 MMOL/L
BUN SERPL-MCNC: 22 MG/DL (ref 9–21)
BUN/CREAT SERPL: 28
CA-I BLD-MCNC: 9.5 MG/DL (ref 8.5–10.5)
CHLORIDE SERPL-SCNC: 104 MMOL/L (ref 94–111)
CO2 SERPL-SCNC: 27 MMOL/L (ref 21–33)
CREAT SERPL-MCNC: 0.8 MG/DL (ref 0.7–1.2)
GLUCOSE SERPL-MCNC: 94 MG/DL (ref 70–110)
POTASSIUM SERPL-SCNC: 3.8 MMOL/L (ref 3.2–5.1)
SODIUM SERPL-SCNC: 139 MMOL/L (ref 135–145)

## 2022-04-04 PROCEDURE — 80048 BASIC METABOLIC PNL TOTAL CA: CPT

## 2022-04-04 PROCEDURE — 36415 COLL VENOUS BLD VENIPUNCTURE: CPT

## 2022-04-05 ENCOUNTER — PATIENT OUTREACH (OUTPATIENT)
Dept: OTHER | Age: 62
End: 2022-04-05

## 2022-04-05 NOTE — PROGRESS NOTES
[facility]STRE[facility] contacted patient for follow-up. Patient identifiers confirmed, zip code and . Purpose of TC  Social Security Disability  [employer]     TC details  Patient stated that she did not understand the [employer] paperwork that was mailed to her. Ms. Michael Martinez read the documents to MSTRENA DOYLE. The paperwork read as though it had to be completed and returned for LTD approval.  Ms. Michael Martinez stated the [facility] representative congratulated her on 22 for being approved for LTD. [facility] encouraged patient to contact [facility] representative to confirm what paperwork was needed to proceed with LTD, if any. [facility] spoke to patient regarding health insurance options, specifically Medicaid and COBRA. Patient stated that she was interested in applying for Medicaid as COBRA would be too costly. Patient stated that she would call [facility] once to confirms LTD approval and benefit amount. [facility] will assist patient with completing Medicaid application. Plan of action  [facility] will follow-up with patient next week.

## 2022-04-06 ENCOUNTER — PATIENT OUTREACH (OUTPATIENT)
Dept: OTHER | Age: 62
End: 2022-04-06

## 2022-04-06 RX ORDER — SACUBITRIL AND VALSARTAN 49; 51 MG/1; MG/1
1 TABLET, FILM COATED ORAL 2 TIMES DAILY
COMMUNITY
End: 2022-07-28

## 2022-04-06 NOTE — PROGRESS NOTES
Follow up phone call to patient, two pt identifiers verified. Discussed patient's goals:   Goals        Patient Stated      Patient will apply for financial assistance programs (pt-stated)       Patient will apply for financial assistance programs by 12/1/21        Patient will apply for SNAP benefits (pt-stated)       Patient will apply for SNAP benefits by 12/1/21         Other      Completes all follow-up appointments within 30 days of ED visit        Educate and encourage importance of FU for prevention of complications or disease;   Assess the patient's relationship with a PCP and next FU visit scheduled;  o PCP f/up on 10/19   Discuss importance of adherence to treatment plan and follow up visits;   Identify any barriers in transportation or access to FU appointments.  Assist patient with making FU appointments as needed;    It's also a good idea to know your test results.  Keep a list of the medicines you take. 10/22: Patient had f/up appt with her PCP on 10/19, states her BP was better and she is feeling better. Has Mammogram scheduled for today, 10/22.  11/12: Feeling better, SOB has gotten better with lasix. On heart monitor until Monday, 11/15.  11/19: May have heart cath based on findings through heart monitor, states her cardiologist is attempting to get a date for this procedure. Provided support and encouragement. Next appt with cardiology is on 12/1.  12/13: 1/3/21 heart cath. Continues on lasix, SOB is better. 1/5: Heart cath was negative, SOB with exertion. Dr. Karyle Petite appt on 1/25, Dr. Leonardo  1/21.    1/26: Dr. Malissa Gonzalez 1/28. Had follow up with Dr. Karyle Petite on 1/25. 2/2: Patient states her HR and BP elevated. BP checks every two weeks. Nex appointment with Dr. Malissa Gonzalez, cardiology is on 4/1. Started on Cite [place]. Cardiologist wants her to remain out of work to see how she does on this medication. Encouraged patient to get paperwork in to [facility] as soon as possible.   2/23: F/up on 4/1 with Dr. Erickson Fortune. Cite Narinder Samson is making her sleepy. BP is good, 130/80 on today. States she needs to contact Dr. Julia Louis office for STD paperwork. Encouraged her to do this so she does not miss getting paid. Also discussed transition to long term disability after 6 months of STD and job can be advertised. 3/30: Patient has appointment with Dr. Erickson Fortune on 4/1. Discussed effects of hypertension on vital organs, such as kidneys. Encouraged patient to eat healthy diet, discussed citrus fruits, bananas, low sodium. 4/6: States she is approved for LTD (per Edu Muñozo). Blood pressure 150/90. Increased the entresto dose and increased lasix dose too. Discussed weight gain in fluids. Was advised to call if she has 5 lbs. NP Seymour on 7/28.   Demonstrates no return to ED or red flags within 30 days       · Assess patient knowledge of how to contact the provider for questions if needed;  · Educate patient on importance of monitoring for any red flags;  · Review importance of reporting any changes, red flags to the provider and/or PCP;  · Assess patient's knowledge of discharge instructions and any restrictions;  · Educate patient when to call 911;  · Assess for any barriers with safety at home  · Discuss use of nurse access line and location of number on front of insurance card. 10/22: No red flags. 11/12: no red flags  11/19: No red flags. 12/13: No red flags. 1/5: No red flags. 1/26: No red flags. 2/2: No red flags. 2/23: No red flags. Feels adelaida is making her tired, suggested follow up call with cardiology to ask if there is another medication she could try.  3/30: No red flags. Still taking entresto. BP has been 150/90s. She is going once a week to have BP checks at Dr. Julia Louis office. 4/6: No red flags.       [place]  Supportive Resources       · [employer] - # [phone]  · [facility] [facility] 24/7 (virtual visits 24/7)  · Life Matters # [phone] PW: [facility] for associates  · Nurse Access line 24/7 # [phone]  · Be Well (www.[employer] [place])  · HR Service Now - [place]   · BSM Workday  · IT - 3-398-419-7007  · MyChart Help - 1- [phone]  Associate Services for advice and direction, by calling [phone] and pressing     11/12: Patient concerned with financials. 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 Informed her of Caring for Our Own. Patient states she does not have a computer,  would like to apply. Will refer Ronnald Pallas MSW             Patient's primary care provider relationship reviewed with patient and modified, as applicable. Readiness to Change: []  Pre-contemplative    []  Contemplative  []  Preparation               [x]  Action                  []  Maintenance    Barriers/Challenges to Care: []  Decline in memory    []  Language barrier     []  Emotional                  []  Limited mobility  []  Lack of motivation     [] Vision, hearing or cognitive impairment []  Knowledge [] Financial Barriers []  Lack of support  []  Pain []  Other [x]  None    Key pt activities to achieve better health:   [x]  Weight loss  []  Improved diabetic control  []  Decreased cholesterol levels  [x]  Decreased blood pressure  []    []    Upcoming appointments:   Future Appointments   Date Time Provider Faina Ambrose   7/28/2022  9:00 AM Annmarie Bay NP [facility] BS AMB     Plan for next call: Call in two weeks, 4/20.

## 2022-04-12 ENCOUNTER — PATIENT OUTREACH (OUTPATIENT)
Dept: OTHER | Age: 62
End: 2022-04-12

## 2022-04-12 NOTE — PROGRESS NOTES
MSW ECM attempted to contact patient. No answer. VM left. The purpose of TC was to discuss health insurance and LTD benefits.

## 2022-04-13 ENCOUNTER — PATIENT OUTREACH (OUTPATIENT)
Dept: OTHER | Age: 62
End: 2022-04-13

## 2022-04-13 NOTE — PROGRESS NOTES
MADISON DOYLE received TC from patient. Patient identifiers confirmed, zip code and . Purpose of TC  LTD and Medicaid      TC details  MsMegan Barboza stated that she was approved for  LTD in the amount of $1560/month. Patient is still interested in applying for Medicaid for medical coverage. She stated that COBRA would not be affordable. Plan of action  Ms. Perez requested assistance with applying for Medicaid online.   MADISON DOYLE will assit patient on 4/15/22 at 2pm.

## 2022-04-15 ENCOUNTER — PATIENT OUTREACH (OUTPATIENT)
Dept: OTHER | Age: 62
End: 2022-04-15

## 2022-04-15 NOTE — PROGRESS NOTES
MSW IVVEK contacted Ms. Carolina Maki for follow-up. Patient identifiers confirmed, zip code and . Purpose of TC  Complete Medicaid application  Explore Social Security disability    TC details  MSW [facility] assisted patient with completing Medicaid application. Ms. Carolina Maki already has SNAP benefits. MSW VIVEK discussed Social Security Disability. MSTRENA DOYLE answered patient questions about the process and eligibility. Patient decided against applying for Social Security Disability due to the income possibly disqualifying her for Medicaid. Plan of action  MSTRENA [facility] provided patient with the tracking number of Medicaid application. Patient will provide update when she receives correspondence from Jen Moss.

## 2022-04-20 ENCOUNTER — PATIENT OUTREACH (OUTPATIENT)
Dept: OTHER | Age: 62
End: 2022-04-20

## 2022-04-20 NOTE — PROGRESS NOTES
Attempt to reach patient for follow up. Discreet VM left with contact information. Will attempt another outreach in one week, 4/27. Per chart review, patient has PCP appointment on 7/28/22.

## 2022-04-26 ENCOUNTER — PATIENT OUTREACH (OUTPATIENT)
Dept: OTHER | Age: 62
End: 2022-04-26

## 2022-04-26 NOTE — PROGRESS NOTES
Follow up phone call to patient, two pt identifiers verified. Discussed patient's goals:   Goals        Patient Stated      Patient will apply for financial assistance programs (pt-stated)       Patient will apply for financial assistance programs by 12/1/21        Patient will apply for SNAP benefits (pt-stated)       Patient will apply for SNAP benefits by 12/1/21         Other      Completes all follow-up appointments within 30 days of ED visit        Educate and encourage importance of FU for prevention of complications or disease;   Assess the patient's relationship with a PCP and next FU visit scheduled;  o PCP f/up on 10/19   Discuss importance of adherence to treatment plan and follow up visits;   Identify any barriers in transportation or access to FU appointments.  Assist patient with making FU appointments as needed;    It's also a good idea to know your test results.  Keep a list of the medicines you take. 10/22: Patient had f/up appt with her PCP on 10/19, states her BP was better and she is feeling better. Has Mammogram scheduled for today, 10/22.  11/12: Feeling better, SOB has gotten better with lasix. On heart monitor until Monday, 11/15.  11/19: May have heart cath based on findings through heart monitor, states her cardiologist is attempting to get a date for this procedure. Provided support and encouragement. Next appt with cardiology is on 12/1.  12/13: 1/3/21 heart cath. Continues on lasix, SOB is better. 1/5: Heart cath was negative, SOB with exertion. Dr. Phillip Palomo appt on 1/25, Dr. Yodit Cotton 1/21.    1/26: Dr. Yodit Cotton 1/28. Had follow up with Dr. Phillip Palomo on 1/25. 2/2: Patient states her HR and BP elevated. BP checks every two weeks. Nex appointment with Dr. Yodit Cotton, cardiology is on 4/1. Started on Laverne Budds. Cardiologist wants her to remain out of work to see how she does on this medication. Encouraged patient to get paperwork in to [facility] as soon as possible.   2/23: F/up on 4/1 with Dr. Emeka Trujillo. Sanam Hallman is making her sleepy. BP is good, 130/80 on today. States she needs to contact Dr. Cheyenne Tabares office for STD paperwork. Encouraged her to do this so she does not miss getting paid. Also discussed transition to long term disability after 6 months of STD and job can be advertised. 3/30: Patient has appointment with Dr. Emeka Trujillo on 4/1. Discussed effects of hypertension on vital organs, such as kidneys. Encouraged patient to eat healthy diet, discussed citrus fruits, bananas, low sodium. 4/6: States she is approved for LTD (per Randy Lindquist). Blood pressure 150/90. Increased the entresto dose and increased lasix dose too. Discussed weight gain in fluids. Was advised to call if she has 5 lbs. NP Mckenna on 7/28. Last appt on 4/1. Has appointment scheduled for May to check weight and BP. Cardio f/up 6/6 for Echo and 6/17 for f/up. 4/26: Increased lasix, swelling goes down when she props her feet, when she walks after an hour. She is wondering if entresto is causing this. Looked up drug, let patient know this medication is supposed to help relieve swelling. Next appt is in June - for echo and EKG. NP Mckenna on 7/28. Encouraged patient to contact cardiology to let them know about her BLE swelling. She declined my offer to reach out to her provider on her behalf, states she will call. BP was 128/84 yesterday, monitoring at home.         Demonstrates no return to ED or red flags within 30 days       · Assess patient knowledge of how to contact the provider for questions if needed;  · Educate patient on importance of monitoring for any red flags;  · Review importance of reporting any changes, red flags to the provider and/or PCP;  · Assess patient's knowledge of discharge instructions and any restrictions;  · Educate patient when to call 911;  · Assess for any barriers with safety at home  · Discuss use of nurse access line and location of number on front of insurance card.  10/22: No red flags. 11/12: no red flags  11/19: No red flags. 12/13: No red flags. 1/5: No red flags. 1/26: No red flags. 2/2: No red flags. 2/23: No red flags. Feels entrejesica is making her tired, suggested follow up call with cardiology to ask if there is another medication she could try.  3/30: No red flags. Still taking entresto. BP has been 150/90s. She is going once a week to have BP checks at Dr. Patricia Freeman office. 4/6: No red flags. 4/26: No red flags. [place] Mathis  Supportive Resources       · [employer] - # [phone]  · [employer] 24/7 (virtual visits 24/7)  · Life Matters # [phone] PW: [facility] for associates  · Nurse Access line 24/7 # [phone]  · Be Well (www.[employer])  · HR Service Now - [place]   · BSM Workday  · IT - 1-577-634-307-704-3467  · [employer] Help - 1- 381.307.7406  Associate Services for advice and direction, by calling [phone] and pressing     11/12: Patient concerned with financials. 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 Patient states she does not have a computer,  would like to apply. Will refer Juan J WALLACE             Patient's primary care provider relationship reviewed with patient and modified, as applicable.     Readiness to Change: []  Pre-contemplative    []  Contemplative  []  Preparation               [x]  Action                  []  Maintenance    Barriers/Challenges to Care: []  Decline in memory    []  Language barrier     []  Emotional                  []  Limited mobility  []  Lack of motivation     [] Vision, hearing or cognitive impairment []  Knowledge [] Financial Barriers []  Lack of support  []  Pain []  Other [x]  None    Key pt activities to achieve better health:   [x]  Weight loss  []  Improved diabetic control  []  Decreased cholesterol levels  []  Decreased blood pressure  []    []    Upcoming appointments:   Future Appointments   Date Time Provider Faina Ambrose   7/28/2022  9:00 AM Marine, Catalino Gibson NP [facility] BS AMB     Plan for next call: Call patient on 4/27.

## 2022-04-27 ENCOUNTER — PATIENT OUTREACH (OUTPATIENT)
Dept: OTHER | Age: 62
End: 2022-04-27

## 2022-04-27 NOTE — PROGRESS NOTES
Attempt to reach patient for follow up. Discreet VM left with contact information. Will call on 5/2 to check on patient if I do not hear back from her before then.

## 2022-05-02 ENCOUNTER — PATIENT OUTREACH (OUTPATIENT)
Dept: OTHER | Age: 62
End: 2022-05-02

## 2022-05-02 NOTE — PROGRESS NOTES
Attempt to reach patient for follow up. Discreet VM left with contact information. To send lost to follow up letter. Per chart review, patient has appointment with her PCP on 7/28.

## 2022-05-02 NOTE — LETTER
5/2/2022 12:33 PM    Ms. Patterson Colombian  [address]  [place] [address]        Dear Ms. Aisha Corbett,     I have been trying to reach you for a follow up call for services with our Associate Care Management Program. Your wellbeing is very important to us. With continued partnership in the [facility] program, we hope to work with you to optimize your health and increase your quality of life. I look forward to continuing to work with you. Please contact me at your convenience and we can schedule a time that works best for you. Sincerely,      Angie C. Junette Prader, RN, [address]  [place] [phone]   [phone]  [email or web address]  2 [facility] email: [email or web address]

## 2022-05-03 ENCOUNTER — PATIENT OUTREACH (OUTPATIENT)
Dept: OTHER | Age: 62
End: 2022-05-03

## 2022-05-03 NOTE — PROGRESS NOTES
MSW ECM attempted to contact patient. No answer. VM left. MSW ECM will call again if call is not returned. Purpose of TC  Inquire about status of Medicaid application.

## 2022-05-09 ENCOUNTER — PATIENT OUTREACH (OUTPATIENT)
Dept: OTHER | Age: 62
End: 2022-05-09

## 2022-05-09 NOTE — PROGRESS NOTES
Follow up phone call to patient, two pt identifiers verified. Discussed patient's goals:   Goals        Patient Stated      Patient will apply for financial assistance programs (pt-stated)       Patient will apply for financial assistance programs by 12/1/21        Patient will apply for SNAP benefits (pt-stated)       Patient will apply for SNAP benefits by 12/1/21         Other      Completes all follow-up appointments within 30 days of ED visit        Educate and encourage importance of FU for prevention of complications or disease;   Assess the patient's relationship with a PCP and next FU visit scheduled;  o PCP f/up on 10/19   Discuss importance of adherence to treatment plan and follow up visits;   Identify any barriers in transportation or access to FU appointments.  Assist patient with making FU appointments as needed;    It's also a good idea to know your test results.  Keep a list of the medicines you take. 10/22: Patient had f/up appt with her PCP on 10/19, states her BP was better and she is feeling better. Has Mammogram scheduled for today, 10/22.  11/12: Feeling better, SOB has gotten better with lasix. On heart monitor until Monday, 11/15.  11/19: May have heart cath based on findings through heart monitor, states her cardiologist is attempting to get a date for this procedure. Provided support and encouragement. Next appt with cardiology is on 12/1.  12/13: 1/3/21 heart cath. Continues on lasix, SOB is better. 1/5: Heart cath was negative, SOB with exertion. Dr. Ana Morrison appt on 1/25, Dr. Arva Hodgkins 1/21.    1/26: Dr. Arva Hodgkins 1/28. Had follow up with Dr. Ana Morrison on 1/25. 2/2: Patient states her HR and BP elevated. BP checks every two weeks. Nex appointment with Dr. Arva Hodgkins, cardiology is on 4/1. Started on Krause-Valderrama. Cardiologist wants her to remain out of work to see how she does on this medication. Encouraged patient to get paperwork in to [facility] as soon as possible.   2/23: F/up on 4/1 with Dr. Kalli Hanley. Lorena is making her sleepy. BP is good, 130/80 on today. States she needs to contact Dr. Deb Quarles office for STD paperwork. Encouraged her to do this so she does not miss getting paid. Also discussed transition to long term disability after 6 months of STD and job can be advertised. 3/30: Patient has appointment with Dr. Kalli Hanley on 4/1. Discussed effects of hypertension on vital organs, such as kidneys. Encouraged patient to eat healthy diet, discussed citrus fruits, bananas, low sodium. 4/6: States she is approved for LTD (per [facility]). Blood pressure 150/90. Increased the entresto dose and increased lasix dose too. Discussed weight gain in fluids. Was advised to call if she has 5 lbs. NP Mckenna on 7/28. Last appt on 4/1. Has appointment scheduled for May to check weight and BP. Cardio f/up 6/6 for Echo and 6/17 for f/up. 4/26: Increased lasix, swelling goes down when she props her feet, when she walks after an hour. She is wondering if entresto is causing this. Looked up drug, let patient know this medication is supposed to help relieve swelling. Next appt is in June - for echo and EKG. NP Mckenna on 7/28. Encouraged patient to contact cardiology to let them know about her BLE swelling. She declined my offer to reach out to her provider on her behalf, states she will call. BP was 128/84 yesterday, monitoring at home. 5/9: Doubled lasix due to edema,  Has appointment with cardio today, 5/9. States she got her medicaid card.         Demonstrates no return to ED or red flags within 30 days       · Assess patient knowledge of how to contact the provider for questions if needed;  · Educate patient on importance of monitoring for any red flags;  · Review importance of reporting any changes, red flags to the provider and/or PCP;  · Assess patient's knowledge of discharge instructions and any restrictions;  · Educate patient when to call 911;  · Assess for any barriers with safety at home  · Discuss use of nurse access line and location of number on front of insurance card. 10/22: No red flags. 11/12: no red flags  11/19: No red flags. 12/13: No red flags. 1/5: No red flags. 1/26: No red flags. 2/2: No red flags. 2/23: No red flags. Feels entreso is making her tired, suggested follow up call with cardiology to ask if there is another medication she could try.  3/30: No red flags. Still taking entresto. BP has been 150/90s. She is going once a week to have BP checks at Dr. Julia Louis office. 4/6: No red flags. 4/26: No red flags. 5/9: No red flags. Og [facility]  Supportive Resources       · [employer] [facility] - # [phone]  · [facility] 24/7 (virtual visits 24/7)  · Life Matters # [phone] PW: [facility] for associates  · Nurse Access line 24/7 # 072-570-0081  · Be Well (www.[employer])  · HR Service Now - [place]   · BSM Workday  · IT - 1-735-502-3273  · MyChart Help - 1- 133-543-6145  Associate Services for advice and direction, by calling [phone] and pressing     11/12: Patient concerned with financials. 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 Patient states she does not have a computer,  would like to apply. Will refer Dung Walkernik MSW             Patient's primary care provider relationship reviewed with patient and modified, as applicable. Readiness to Change: []  Pre-contemplative    []  Contemplative  []  Preparation               [x]  Action                  []  Maintenance    Barriers/Challenges to Care: []  Decline in memory    []  Language barrier     []  Emotional                  []  Limited mobility  []  Lack of motivation     [] Vision, hearing or cognitive impairment []  Knowledge [] Financial Barriers []  Lack of support  []  Pain []  Other [x]  None  On long term disability as of May 3, 2022.       Upcoming appointments:   Future Appointments   Date Time Provider Faina Ambrose   7/28/2022  9:00 AM Raulito Bailey NP [facility] BS AMB     Plan for next call: Call on 5/27.

## 2022-05-10 ENCOUNTER — PATIENT OUTREACH (OUTPATIENT)
Dept: OTHER | Age: 62
End: 2022-05-10

## 2022-05-10 NOTE — PROGRESS NOTES
MADISON DOYLE contacted patient for follow-up. Patient identifiers confirmed, zip code and . Purpose of TC  Follow-up on Medicaid status    TC details  Ms. Brenden Shea stated that she was approved for [employer]. She will receive $1500/month. Patient was also approved for Medicaid. Patient added that her left leg has been swollen for a few days. She is scheduled to see the cardiologist tomorrow. Ms. Brenden Shea expressed concern about outstanding medical bills from [employer]. MADISON DOYLE discussed with patient the details of the [employer].  Patient stated that she was interested in applying. MADISON DOYLE will have application mailed to patient. Patient expressed relief from being approved for LTD and Medicaid. Plan of action  [employer] [facility] financial assistance application will be mailed to patient.

## 2022-05-11 ENCOUNTER — PATIENT OUTREACH (OUTPATIENT)
Dept: OTHER | Age: 62
End: 2022-05-11

## 2022-05-11 NOTE — PROGRESS NOTES
05/11/22 CCSS Erica assisting ACMJudie RN with outreach/UTR letter. Letter generated, printed, and mailed.

## 2022-05-11 NOTE — PROGRESS NOTES
5/11/22 CHERIE Jesus,  Care Coordinator, with mailing the patient a financial assistance application. Application printed and mailed.

## 2022-05-18 ENCOUNTER — PATIENT OUTREACH (OUTPATIENT)
Dept: OTHER | Age: 62
End: 2022-05-18

## 2022-05-18 NOTE — PROGRESS NOTES
MSW VIVEK attempted to contact patient for follow-up. No answer VM left. Purpose of TC was to confirm receipt to [facility] application. MADISON ECM will call back in a few days if call is not returned.

## 2022-05-27 ENCOUNTER — PATIENT OUTREACH (OUTPATIENT)
Dept: OTHER | Age: 62
End: 2022-05-27

## 2022-05-27 NOTE — PROGRESS NOTES
Attempt to reach patient for follow up. Discreet VM left with contact information. Will attempt another outreach on 6/2.

## 2022-06-01 ENCOUNTER — PATIENT OUTREACH (OUTPATIENT)
Dept: OTHER | Age: 62
End: 2022-06-01

## 2022-06-01 NOTE — PROGRESS NOTES
MSW ECM contacted patient for follow-up. Patient identifiers confirmed, zip code and . Purpose of TC  Follow-up on financial assistance application. TC details  Ms. Elizabeth Jose stated that she received the [employer] application. Patient stated that she did not have any questions about completing the form. Ms. Elizabeth Jose stated that she was in the process of obtaining the needed documents to submit the application. Plan of action  Patient stated that she did not have any additional MSW ECM needs at this time.

## 2022-06-02 ENCOUNTER — PATIENT OUTREACH (OUTPATIENT)
Dept: OTHER | Age: 62
End: 2022-06-02

## 2022-06-02 NOTE — PROGRESS NOTES
Follow up phone call to patient, two pt identifiers verified. Discussed patient's goals:   Goals        Patient Stated      Patient will apply for financial assistance programs (pt-stated)       Patient will apply for financial assistance programs by 12/1/21        Patient will apply for SNAP benefits (pt-stated)       Patient will apply for SNAP benefits by 12/1/21         Other      Completes all follow-up appointments within 30 days of ED visit        Educate and encourage importance of FU for prevention of complications or disease;   Assess the patient's relationship with a PCP and next FU visit scheduled;  o PCP f/up on 10/19   Discuss importance of adherence to treatment plan and follow up visits;   Identify any barriers in transportation or access to FU appointments.  Assist patient with making FU appointments as needed;    It's also a good idea to know your test results.  Keep a list of the medicines you take. 10/22: Patient had f/up appt with her PCP on 10/19, states her BP was better and she is feeling better. Has Mammogram scheduled for today, 10/22.  11/12: Feeling better, SOB has gotten better with lasix. On heart monitor until Monday, 11/15.  11/19: May have heart cath based on findings through heart monitor, states her cardiologist is attempting to get a date for this procedure. Provided support and encouragement. Next appt with cardiology is on 12/1.  12/13: 1/3/21 heart cath. Continues on lasix, SOB is better. 1/5: Heart cath was negative, SOB with exertion. Dr. Mina Bautista appt on 1/25, Dr. Chavez Gonzalez 1/21.    1/26: Dr. Chavez Gonzalez 1/28. Had follow up with Dr. Mina Bautista on 1/25. 2/2: Patient states her HR and BP elevated. BP checks every two weeks. Nex appointment with Dr. Chavez Gonzalez, cardiology is on 4/1. Started on [employer]. Cardiologist wants her to remain out of work to see how she does on this medication. Encouraged patient to get paperwork in to [facility] as soon as possible.   2/23: F/up on 4/1 with Dr. Je Guzman. Prince Singh is making her sleepy. BP is good, 130/80 on today. States she needs to contact Dr. Abran Rendon office for STD paperwork. Encouraged her to do this so she does not miss getting paid. Also discussed transition to long term disability after 6 months of STD and job can be advertised. 3/30: Patient has appointment with Dr. Je Guzman on 4/1. Discussed effects of hypertension on vital organs, such as kidneys. Encouraged patient to eat healthy diet, discussed citrus fruits, bananas, low sodium. 4/6: States she is approved for LTD (per Jessy Aly). Blood pressure 150/90. Increased the entresto dose and increased lasix dose too. Discussed weight gain in fluids. Was advised to call if she has 5 lbs. NP Mckenna on 7/28. Last appt on 4/1. Has appointment scheduled for May to check weight and BP. Cardio f/up 6/6 for Echo and 6/17 for f/up. 4/26: Increased lasix, swelling goes down when she props her feet, when she walks after an hour. She is wondering if entresto is causing this. Looked up drug, let patient know this medication is supposed to help relieve swelling. Next appt is in June - for echo and EKG. NP Mckenna on 7/28. Encouraged patient to contact cardiology to let them know about her BLE swelling. She declined my offer to reach out to her provider on her behalf, states she will call. BP was 128/84 yesterday, monitoring at home. 5/9: Doubled lasix due to edema,  Has appointment with cardio today, 5/9. States she got her medicaid card. 6/2: 50% EF with entresto. (was 35%). Dr. Je Guzman, 6/17, Dr. Padilla Close 7/28.         Demonstrates no return to ED or red flags within 30 days       · Assess patient knowledge of how to contact the provider for questions if needed;  · Educate patient on importance of monitoring for any red flags;  · Review importance of reporting any changes, red flags to the provider and/or PCP;  · Assess patient's knowledge of discharge instructions and any restrictions;  · Educate patient when to call 911;  · Assess for any barriers with safety at home  · Discuss use of nurse access line and location of number on front of insurance card. 10/22: No red flags. 11/12: no red flags  11/19: No red flags. 12/13: No red flags. 1/5: No red flags. 1/26: No red flags. 2/2: No red flags. 2/23: No red flags. Feels entreso is making her tired, suggested follow up call with cardiology to ask if there is another medication she could try.  3/30: No red flags. Still taking entresto. BP has been 150/90s. She is going once a week to have BP checks at Dr. Ninfa Newsome office. 4/6: No red flags. 4/26: No red flags. 5/9: No red flags. 6/2: Swelling is not as bad. Knows that eating salty foods increases swelling. Propping feet up. Her provider told her swelling should get better. Prince Carias  Supportive Resources       · [employer] [facility] - # [phone]  · [facility] [employer] [facility] 24/7 (virtual visits 24/7)  · [employer] # [phone] PW: [facility] for associates  · Nurse Access line 24/7 # [phone]  · [employer] (www.Ge.tt)  · [employer] - [place]   · BS Work[employer]  · IT - 1-201-689-684-445-2876  · [employer] - 1- 198.407.6927  Associate Services for advice and direction, by calling [phone] and pressing     11/12: Patient concerned with financials. 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 Informed her of [employer]. Patient states she does not have a computer,  would like to apply. Will refer Josue WALLACE             Patient's primary care provider relationship reviewed with patient and modified, as applicable.     Readiness to Change: []  Pre-contemplative    []  Contemplative  []  Preparation               [x]  Action                  []  Maintenance    Barriers/Challenges to Care: []  Decline in memory    []  Language barrier     []  Emotional                  []  Limited mobility  []  Lack of motivation     [] Vision, hearing or cognitive impairment []  Knowledge [] Financial Barriers []  Lack of support  []  Pain []  Other []  None    Discussed effects of salt on edema. Patient stated she was aware of this. Mailed nutritional guide for CHF that discusses this in more detail. Key pt activities to achieve better health:   [x]  Weight loss  []  Improved diabetic control  []  Decreased cholesterol levels  []  Decreased blood pressure  []    []    Upcoming appointments:   Future Appointments   Date Time Provider Faina Ambrose   7/28/2022  9:00 AM Devika Dodd NP [facility] BS AMB     Plan for next call: Call on 6/23.

## 2022-06-23 ENCOUNTER — PATIENT OUTREACH (OUTPATIENT)
Dept: OTHER | Age: 62
End: 2022-06-23

## 2022-07-07 ENCOUNTER — PATIENT OUTREACH (OUTPATIENT)
Dept: OTHER | Age: 62
End: 2022-07-07

## 2022-07-07 NOTE — PROGRESS NOTES
Attempt to reach patient for follow up. Discreet VM left with contact information. Will attempt another outreach in one week, 7/14.

## 2022-07-14 ENCOUNTER — PATIENT OUTREACH (OUTPATIENT)
Dept: OTHER | Age: 62
End: 2022-07-14

## 2022-07-14 RX ORDER — SPIRONOLACTONE 25 MG/1
TABLET ORAL DAILY
COMMUNITY

## 2022-07-14 NOTE — PROGRESS NOTES
Patient states she has gotten Medicaid and social security disability. Follow up appts: MELVIN Andres on 7/28. Sleep study needs to be scheduled. Encouraged her to get this done. Cardiology f/up will be after sleep study. Provided phone number to [address] [place] [address] for patient to inquire about her coverage for prescriptions. Transitioning to Medicaid. Will assist to ensure completion.

## 2022-07-28 ENCOUNTER — OFFICE VISIT (OUTPATIENT)
Dept: FAMILY MEDICINE CLINIC | Age: 62
End: 2022-07-28
Payer: COMMERCIAL

## 2022-07-28 VITALS
TEMPERATURE: 97.8 F | BODY MASS INDEX: 36.55 KG/M2 | OXYGEN SATURATION: 98 % | HEIGHT: 66 IN | HEART RATE: 88 BPM | DIASTOLIC BLOOD PRESSURE: 86 MMHG | SYSTOLIC BLOOD PRESSURE: 136 MMHG | RESPIRATION RATE: 19 BRPM | WEIGHT: 227.4 LBS

## 2022-07-28 DIAGNOSIS — E66.01 SEVERE OBESITY (BMI 35.0-39.9) WITH COMORBIDITY (HCC): ICD-10-CM

## 2022-07-28 DIAGNOSIS — I50.22 CHRONIC SYSTOLIC (CONGESTIVE) HEART FAILURE (HCC): ICD-10-CM

## 2022-07-28 DIAGNOSIS — I10 ESSENTIAL HYPERTENSION: Primary | ICD-10-CM

## 2022-07-28 DIAGNOSIS — I42.9 CARDIOMYOPATHY, UNSPECIFIED TYPE (HCC): ICD-10-CM

## 2022-07-28 PROCEDURE — 99214 OFFICE O/P EST MOD 30 MIN: CPT | Performed by: NURSE PRACTITIONER

## 2022-07-28 RX ORDER — ERGOCALCIFEROL 1.25 MG/1
1 CAPSULE ORAL
COMMUNITY
Start: 2022-07-20

## 2022-07-28 RX ORDER — EMPAGLIFLOZIN 10 MG/1
1 TABLET, FILM COATED ORAL DAILY
COMMUNITY
Start: 2022-06-17

## 2022-07-28 RX ORDER — SACUBITRIL AND VALSARTAN 97; 103 MG/1; MG/1
1 TABLET, FILM COATED ORAL 2 TIMES DAILY
COMMUNITY
Start: 2022-07-20

## 2022-07-29 ENCOUNTER — HOSPITAL ENCOUNTER (OUTPATIENT)
Dept: LAB | Age: 62
Discharge: HOME | End: 2022-07-29
Payer: COMMERCIAL

## 2022-07-29 ENCOUNTER — TRANSCRIBE ORDER (OUTPATIENT)
Dept: REGISTRATION | Age: 62
End: 2022-07-29

## 2022-07-29 DIAGNOSIS — I50.9 HEART FAILURE, UNSPECIFIED (HCC): Primary | ICD-10-CM

## 2022-07-29 DIAGNOSIS — I50.9 HEART FAILURE, UNSPECIFIED (HCC): ICD-10-CM

## 2022-07-29 LAB
ANION GAP SERPL CALC-SCNC: 6 MMOL/L (ref 3–18)
BUN SERPL-MCNC: 27 MG/DL (ref 7–18)
BUN/CREAT SERPL: 32 (ref 12–20)
CA-I BLD-MCNC: 9.2 MG/DL (ref 8.5–10.1)
CHLORIDE SERPL-SCNC: 110 MMOL/L (ref 100–111)
CO2 SERPL-SCNC: 23 MMOL/L (ref 21–32)
CREAT SERPL-MCNC: 0.85 MG/DL (ref 0.6–1.3)
GLUCOSE SERPL-MCNC: 100 MG/DL (ref 74–99)
POTASSIUM SERPL-SCNC: 3.8 MMOL/L (ref 3.5–5.5)
SODIUM SERPL-SCNC: 139 MMOL/L (ref 136–145)

## 2022-07-29 PROCEDURE — 36415 COLL VENOUS BLD VENIPUNCTURE: CPT

## 2022-07-29 PROCEDURE — 80048 BASIC METABOLIC PNL TOTAL CA: CPT

## 2022-08-03 PROBLEM — I50.22 CHRONIC SYSTOLIC (CONGESTIVE) HEART FAILURE (HCC): Status: ACTIVE | Noted: 2022-08-03

## 2022-08-03 NOTE — PROGRESS NOTES
Lorin Wallace is a 57 y. o.female presents with   Chief Complaint   Patient presents with    Follow-up       56 yo female presents today for routine fu. She has significant cardiac history and is routinely followed by cardiology. She verbalizes no c/o today. Subjective:           Past Medical History:   Diagnosis Date    Calculus of kidney     Chest pain     pt states occ since oct 2021    Congestive heart failure (HCC)     Dyspnea     pt states on exertion since oct 2021    Essential hypertension     Goiter     Hx of seasonal allergies     Ill-defined condition     pt stated hx of weak heart valve    Menopause      Past Surgical History:   Procedure Laterality Date    HX  SECTION      HX COLONOSCOPY  2019    HX HEART CATHETERIZATION      pt states 10 years ago, and no stents needed    HX HEART CATHETERIZATION  2022    HX TUBAL LIGATION      IR FINE NEEDLE ASPIRATION W IMAGE       Social History     Socioeconomic History    Marital status: SINGLE   Tobacco Use    Smoking status: Never    Smokeless tobacco: Never   Vaping Use    Vaping Use: Never used   Substance and Sexual Activity    Alcohol use: Not Currently    Drug use: Never     Current Outpatient Medications   Medication Sig Dispense Refill    Jardiance 10 mg tablet Take 1 Tablet by mouth daily. ergocalciferol (ERGOCALCIFEROL) 1,250 mcg (50,000 unit) capsule Take 1 Capsule by mouth every seven (7) days. Entresto  mg tablet Take 1 Tablet by mouth two (2) times a day. spironolactone (ALDACTONE) 25 mg tablet Take  by mouth daily. atorvastatin (LIPITOR) 40 mg tablet Take 1 Tablet by mouth daily. 90 Tablet 1    carvediloL (COREG) 25 mg tablet Take 1 Tablet by mouth two (2) times daily (with meals). 180 Tablet 1    fluticasone propionate (Flonase Allergy Relief) 50 mcg/actuation nasal spray 2 Sprays by Both Nostrils route daily. 1 Each 3    furosemide (Lasix) 40 mg tablet Take 1 Tablet by mouth daily.  (Patient taking differently: Take 40 mg by mouth three (3) times daily.) 90 Tablet 1    ferrous sulfate 325 mg (65 mg iron) tablet TAKE 1 TABLET BY MOUTH ONCE DAILY WITH  VITAMIN  C 90 Tablet 1    montelukast (Singulair) 10 mg tablet Take 1 Tablet by mouth daily. 90 Tablet 1    aspirin delayed-release 81 mg tablet Take 81 mg by mouth daily. ascorbic acid, vitamin C, (VITAMIN C) 500 mg tablet Take 500 mg by mouth.      cyanocobalamin 1,000 mcg tablet Take 1,000 mcg by mouth every seven (7) days. Allergies   Allergen Reactions    Lisinopril Cough     Patient is still taking medication     Nitroglycerin Other (comments)     Headaches with patch only. Can take sublingual without any difficulty     The patient has a family history of    REVIEW OF SYSTEMS  Review of Systems   Constitutional:  Negative for chills and fever. HENT:  Negative for ear discharge, ear pain, hearing loss, sinus pain and sore throat. Eyes:  Negative for pain. Respiratory:  Negative for cough and shortness of breath. Cardiovascular:  Negative for chest pain, palpitations and leg swelling. Gastrointestinal:  Negative for abdominal pain, nausea and vomiting. Genitourinary:  Negative for dysuria, frequency and urgency. Musculoskeletal:  Negative for falls, myalgias and neck pain. Skin:  Negative for rash. Neurological:  Negative for dizziness, tingling, tremors and headaches. Psychiatric/Behavioral:  Negative for depression, substance abuse and suicidal ideas. The patient is not nervous/anxious and does not have insomnia.       Objective:     Visit Vitals  /86 (BP 1 Location: Right upper arm, BP Patient Position: Sitting, BP Cuff Size: Large adult)   Pulse 88   Temp 97.8 °F (36.6 °C) (Temporal)   Resp 19   Ht 5' 6\" (1.676 m)   Wt 227 lb 6.4 oz (103.1 kg)   SpO2 98%   BMI 36.70 kg/m²       Current Outpatient Medications   Medication Instructions    ascorbic acid (vitamin C) (VITAMIN C) 500 mg, Oral    aspirin delayed-release 81 mg, Oral, DAILY    atorvastatin (LIPITOR) 40 mg, Oral, DAILY    carvediloL (COREG) 25 mg, Oral, 2 TIMES DAILY WITH MEALS    cyanocobalamin 1,000 mcg, Oral, EVERY 7 DAYS    Entresto  mg tablet 1 Tablet, Oral, 2 TIMES DAILY    ergocalciferol (ERGOCALCIFEROL) 1,250 mcg (50,000 unit) capsule 1 Capsule, Oral, EVERY 7 DAYS    ferrous sulfate 325 mg (65 mg iron) tablet TAKE 1 TABLET BY MOUTH ONCE DAILY WITH  VITAMIN  C    fluticasone propionate (Flonase Allergy Relief) 50 mcg/actuation nasal spray 2 Sprays, Both Nostrils, DAILY    furosemide (LASIX) 40 mg, Oral, DAILY    Jardiance 10 mg tablet 1 Tablet, Oral, DAILY    montelukast (SINGULAIR) 10 mg, Oral, DAILY    spironolactone (ALDACTONE) 25 mg tablet Oral, DAILY        PHYSICAL EXAM  Physical Exam  Constitutional:       Appearance: Normal appearance. Cardiovascular:      Heart sounds: Normal heart sounds. Pulmonary:      Breath sounds: Normal breath sounds. Musculoskeletal:      Right lower leg: No edema. Left lower leg: No edema. Neurological:      Mental Status: She is alert and oriented to person, place, and time. Psychiatric:         Behavior: Behavior normal.       Assessment/Plan:     Diagnoses and all orders for this visit:    1. Essential hypertension    2. Severe obesity (BMI 35.0-39. 9) with comorbidity ([address]    3. Chronic systolic (congestive) heart failure    4. Cardiomyopathy, unspecified type ([facility] [address]    Keep all fu with cardio    Follow-up and Dispositions    Return in about 6 months (around 1/28/2023). Disclaimer:    I have discussed the diagnosis with the patient and the intended plan as seen above. The patient understands our medical plan. The risks, benefits and significant side effects of all medications have been reviewed. Anticipated time course and progression of condition reviewed. All questions have been addressed.   She received an after visit summary, with information reviewed, and questions answered. Where appropriate, she is instructed to call the clinic if she has not been notified either by phone or through [address] with the results of her tests or with an appointment plan for any referrals within 1 week(s). The patient  is to call if her condition worsens or fails to improve or if significant side effects are experienced.        Crow Weeks NP

## 2022-08-04 ENCOUNTER — PATIENT OUTREACH (OUTPATIENT)
Dept: OTHER | Age: 62
End: 2022-08-04

## 2022-08-04 NOTE — PROGRESS NOTES
Attempt to reach patient for follow up. Discreet VM left with contact information. Per chart review, patient had her follow up with her PCP on 7/28. No new recommendations. Follow up in 6 months.

## 2022-08-11 ENCOUNTER — PATIENT OUTREACH (OUTPATIENT)
Dept: OTHER | Age: 62
End: 2022-08-11

## 2022-08-11 NOTE — PROGRESS NOTES
Resolving current episode of case management. Patient has met patient stated and/or medical goals. Patient consistenly demonstrates understanding of the medical action plan through execution of plan. Appointments with key providers are scheduled and attended. Plan of care is modified and updated to address new challenges and barriers with minimal support from the CM team(proactively uses physicians and community resources) The support system remains current and has been modified as needed. Patient continues to acquire needed resources from the current support system established. Teach back demonstrates that patient understands education for self management of chronic conditions. Patient consistenly communicates understanding of signs,symptoms and complications for all major diagnoses. Patient modifies his/her lifestyle toreduce or avoid risk factors to his/her health. ECM will follow as needed and patient has ECM contact information for future needs. Patient states she is doing well. Taking jardiance, which has helped with weight loss. Encouraged her to reach out to her providers with any s/s of heart failure, swelling, shortness of breath to avoid hospitalization.

## 2022-08-26 DIAGNOSIS — I10 ESSENTIAL HYPERTENSION: Primary | ICD-10-CM

## 2022-08-26 RX ORDER — FUROSEMIDE 40 MG/1
40 TABLET ORAL DAILY
Qty: 90 TABLET | Refills: 1 | Status: SHIPPED | OUTPATIENT
Start: 2022-08-26

## 2022-08-26 NOTE — TELEPHONE ENCOUNTER
Order placed for furosemide 40, # 90, PO, per Verbal Order from [facility] on 8/26/2022 due to htn. Last office visit:   07/28/2022  Follow up Visit:  02/07/2023  Last Labs: 10/01/2021        Provider is aware of last labs, office visit and follow up. No further action requested from provider.

## 2023-02-24 ENCOUNTER — TELEPHONE (OUTPATIENT)
Dept: FAMILY MEDICINE CLINIC | Facility: CLINIC | Age: 63
End: 2023-02-24

## 2023-02-24 NOTE — TELEPHONE ENCOUNTER
Pt called about checking in on her paperwork. I told her I put a note on the paperwork that it's due on March 3rd.

## 2023-03-09 ENCOUNTER — HOSPITAL ENCOUNTER (OUTPATIENT)
Age: 63
Discharge: HOME | End: 2023-03-09
Payer: COMMERCIAL

## 2023-03-09 DIAGNOSIS — E83.42 HYPOMAGNESEMIA: ICD-10-CM

## 2023-03-09 LAB
ANION GAP SERPL CALC-SCNC: 10 MMOL/L (ref 3–18)
BUN SERPL-MCNC: 20 MG/DL (ref 7–18)
BUN/CREAT SERPL: 24 (ref 12–20)
CA-I BLD-MCNC: 9.1 MG/DL (ref 8.5–10.1)
CHLORIDE SERPL-SCNC: 107 MMOL/L (ref 100–111)
CO2 SERPL-SCNC: 26 MMOL/L (ref 21–32)
CREAT SERPL-MCNC: 0.83 MG/DL (ref 0.6–1.3)
GLUCOSE SERPL-MCNC: 104 MG/DL (ref 74–99)
MAGNESIUM SERPL-MCNC: 2 MG/DL (ref 1.6–2.6)
POTASSIUM SERPL-SCNC: 4 MMOL/L (ref 3.5–5.5)
SODIUM SERPL-SCNC: 143 MMOL/L (ref 136–145)

## 2023-03-09 PROCEDURE — 80061 LIPID PANEL: CPT

## 2023-03-09 PROCEDURE — 36415 COLL VENOUS BLD VENIPUNCTURE: CPT

## 2023-03-09 PROCEDURE — 80048 BASIC METABOLIC PNL TOTAL CA: CPT

## 2023-03-09 PROCEDURE — 83735 ASSAY OF MAGNESIUM: CPT

## 2023-03-10 LAB
CHOLEST SERPL-MCNC: 146 MG/DL
HDLC SERPL-MCNC: 56 MG/DL (ref 40–60)
HDLC SERPL: 2.6 (ref 0–5)
LDLC SERPL CALC-MCNC: 77.2 MG/DL (ref 0–100)
LIPID PANEL: NORMAL
TRIGL SERPL-MCNC: 64 MG/DL
VLDLC SERPL CALC-MCNC: 12.8 MG/DL

## 2023-04-03 ENCOUNTER — TELEPHONE (OUTPATIENT)
Dept: FAMILY MEDICINE CLINIC | Facility: CLINIC | Age: 63
End: 2023-04-03

## 2023-04-03 NOTE — TELEPHONE ENCOUNTER
----- Message from Oral Mayans sent at 4/3/2023 10:56 AM EDT -----  Subject: Appointment Request    Reason for Call: Established Patient Appointment needed: Routine Existing   Condition Follow Up    QUESTIONS    Reason for appointment request? No appointments available during search     Additional Information for Provider? Pt. called and missed her appt.   today. She tried to reschedule and first appt. is May 16th. She would like   an appt. sooner.  Please call her.  ---------------------------------------------------------------------------  --------------  Kash Taylor [facility]  [phone]; OK to leave message on voicemail  ---------------------------------------------------------------------------  --------------  SCRIPT ANSWERS  COVID Screen: Janell Hernandez
Ambulance

## 2023-04-21 ENCOUNTER — OFFICE VISIT (OUTPATIENT)
Dept: FAMILY MEDICINE CLINIC | Facility: CLINIC | Age: 63
End: 2023-04-21

## 2023-04-21 VITALS
WEIGHT: 219.6 LBS | RESPIRATION RATE: 19 BRPM | HEIGHT: 66 IN | OXYGEN SATURATION: 98 % | TEMPERATURE: 98.5 F | DIASTOLIC BLOOD PRESSURE: 89 MMHG | BODY MASS INDEX: 35.29 KG/M2 | HEART RATE: 86 BPM | SYSTOLIC BLOOD PRESSURE: 139 MMHG

## 2023-04-21 DIAGNOSIS — I10 ESSENTIAL (PRIMARY) HYPERTENSION: ICD-10-CM

## 2023-04-21 DIAGNOSIS — E66.01 SEVERE OBESITY (BMI 35.0-39.9) WITH COMORBIDITY (HCC): ICD-10-CM

## 2023-04-21 DIAGNOSIS — I50.22 CHRONIC SYSTOLIC (CONGESTIVE) HEART FAILURE (HCC): ICD-10-CM

## 2023-04-21 DIAGNOSIS — I42.9 CARDIOMYOPATHY, UNSPECIFIED TYPE (HCC): ICD-10-CM

## 2023-04-21 DIAGNOSIS — E53.9 VITAMIN B DEFICIENCY: ICD-10-CM

## 2023-04-21 DIAGNOSIS — E55.9 VITAMIN D DEFICIENCY: ICD-10-CM

## 2023-04-21 DIAGNOSIS — Z12.31 ENCOUNTER FOR SCREENING MAMMOGRAM FOR MALIGNANT NEOPLASM OF BREAST: Primary | ICD-10-CM

## 2023-04-21 DIAGNOSIS — E61.1 IRON DEFICIENCY: ICD-10-CM

## 2023-04-21 SDOH — ECONOMIC STABILITY: FOOD INSECURITY: WITHIN THE PAST 12 MONTHS, YOU WORRIED THAT YOUR FOOD WOULD RUN OUT BEFORE YOU GOT MONEY TO BUY MORE.: NEVER TRUE

## 2023-04-21 SDOH — ECONOMIC STABILITY: HOUSING INSECURITY
IN THE LAST 12 MONTHS, WAS THERE A TIME WHEN YOU DID NOT HAVE A STEADY PLACE TO SLEEP OR SLEPT IN A SHELTER (INCLUDING NOW)?: NO

## 2023-04-21 SDOH — ECONOMIC STABILITY: FOOD INSECURITY: WITHIN THE PAST 12 MONTHS, THE FOOD YOU BOUGHT JUST DIDN'T LAST AND YOU DIDN'T HAVE MONEY TO GET MORE.: NEVER TRUE

## 2023-04-21 SDOH — ECONOMIC STABILITY: INCOME INSECURITY: HOW HARD IS IT FOR YOU TO PAY FOR THE VERY BASICS LIKE FOOD, HOUSING, MEDICAL CARE, AND HEATING?: NOT VERY HARD

## 2023-04-21 ASSESSMENT — PATIENT HEALTH QUESTIONNAIRE - PHQ9
2. FEELING DOWN, DEPRESSED OR HOPELESS: 1
SUM OF ALL RESPONSES TO PHQ QUESTIONS 1-9: 1
SUM OF ALL RESPONSES TO PHQ QUESTIONS 1-9: 1
SUM OF ALL RESPONSES TO PHQ9 QUESTIONS 1 & 2: 1
SUM OF ALL RESPONSES TO PHQ QUESTIONS 1-9: 1
SUM OF ALL RESPONSES TO PHQ QUESTIONS 1-9: 1
1. LITTLE INTEREST OR PLEASURE IN DOING THINGS: 0

## 2023-04-21 ASSESSMENT — ENCOUNTER SYMPTOMS
SHORTNESS OF BREATH: 0
CHEST TIGHTNESS: 0

## 2023-04-21 NOTE — PROGRESS NOTES
Veronica Arenas presents today for   Chief Complaint   Patient presents with    Follow-up       Is someone accompanying this pt? no    Is the patient using any DME equipment during OV? no    Depression Screening:  PHQ-9 Questionaire 4/21/2023 7/28/2022   Little interest or pleasure in doing things 0 0   Feeling down, depressed, or hopeless 1 0   PHQ-9 Total Score 1 0       Fall Risk  No flowsheet data found. Health Maintenance reviewed and discussed and ordered per Provider. Health Maintenance Due   Topic Date Due    HIV screen  Never done    Cervical cancer screen  Never done    Diabetes screen  Never done    DTaP/Tdap/Td vaccine (1 - Tdap) 01/02/2008    Shingles vaccine (1 of 2) Never done    Pneumococcal 0-64 years Vaccine (2 - PCV) 05/02/2014    COVID-19 Vaccine (3 - Booster for Pfizer series) 12/31/2021    Breast cancer screen  10/22/2022   . Coordination of Care:    1. \"Have you been to the ER, urgent care clinic since your last visit? Hospitalized since your last visit? \" No    2. \"Have you seen or consulted any other health care providers outside of the [address] [place] [address] since your last visit? \" No     3. For patients aged 39-70: Has the patient had a colonoscopy / FIT/ Cologuard? Yes - no Care Gap present      If the patient is female:    4. For patients aged 41-77: Has the patient had a mammogram within the past 2 years? No      5. For patients aged 21-65: Has the patient had a pap smear?  No

## 2023-04-21 NOTE — PROGRESS NOTES
Trell Campa is a 60 y.o. female presents with   Chief Complaint   Patient presents with    Follow-up   68-year-old female presents today in office for routine 6-month follow-up. She has significant cardiac comorbidities to include cardiomyopathy as well as congestive heart failure on multiple medications followed by cardiology.   Today she verbalizes no complaints      /89 (Site: Left Upper Arm, Position: Sitting, Cuff Size: Large Adult)   Pulse 86   Temp 98.5 °F (36.9 °C) (Temporal)   Resp 19   Ht 5' 6\" (1.676 m)   Wt 219 lb 9.6 oz (99.6 kg)   SpO2 98%   BMI 35.44 kg/m²   Subjective:     Past Medical History:   Diagnosis Date    Calculus of kidney     Chest pain     pt states occ since oct 2021    Congestive heart failure (HCC)     Dyspnea     pt states on exertion since oct 2021    Essential hypertension     Goiter     Hx of seasonal allergies     Ill-defined condition     pt stated hx of weak heart valve    Menopause      Past Surgical History:   Procedure Laterality Date    CARDIAC CATHETERIZATION      pt states 10 years ago, and no stents needed    CARDIAC CATHETERIZATION  2022     SECTION      COLONOSCOPY  2019    IR FNA WITH IMAGING      TUBAL LIGATION       Social History     Socioeconomic History    Marital status: Single     Spouse name: None    Number of children: None    Years of education: None    Highest education level: None   Tobacco Use    Smoking status: Never    Smokeless tobacco: Never   Substance and Sexual Activity    Alcohol use: Not Currently    Drug use: Never     Social Determinants of Health     Financial Resource Strain: Low Risk     Difficulty of Paying Living Expenses: Not very hard   Food Insecurity: No Food Insecurity    Worried About Running Out of Food in the Last Year: Never true    Ran Out of Food in the Last Year: Never true   Transportation Needs: Unknown    Lack of Transportation (Non-Medical): No   Housing Stability: Unknown    Unstable

## 2023-05-17 ENCOUNTER — HOSPITAL ENCOUNTER (OUTPATIENT)
Age: 63
Discharge: HOME | End: 2023-05-20
Payer: COMMERCIAL

## 2023-05-17 VITALS — WEIGHT: 219 LBS | BODY MASS INDEX: 35.2 KG/M2 | HEIGHT: 66 IN

## 2023-05-17 DIAGNOSIS — I50.22 CHRONIC SYSTOLIC (CONGESTIVE) HEART FAILURE (HCC): ICD-10-CM

## 2023-05-17 DIAGNOSIS — E66.01 SEVERE OBESITY (BMI 35.0-39.9) WITH COMORBIDITY (HCC): ICD-10-CM

## 2023-05-17 DIAGNOSIS — Z12.31 ENCOUNTER FOR SCREENING MAMMOGRAM FOR MALIGNANT NEOPLASM OF BREAST: ICD-10-CM

## 2023-05-17 DIAGNOSIS — I10 ESSENTIAL (PRIMARY) HYPERTENSION: ICD-10-CM

## 2023-05-17 DIAGNOSIS — E53.9 VITAMIN B DEFICIENCY: ICD-10-CM

## 2023-05-17 DIAGNOSIS — I42.9 CARDIOMYOPATHY, UNSPECIFIED TYPE (HCC): ICD-10-CM

## 2023-05-17 DIAGNOSIS — E61.1 IRON DEFICIENCY: ICD-10-CM

## 2023-05-17 DIAGNOSIS — E55.9 VITAMIN D DEFICIENCY: ICD-10-CM

## 2023-05-17 LAB
25(OH)D3 SERPL-MCNC: 11 NG/ML (ref 30–100)
ALBUMIN SERPL-MCNC: 3.7 G/DL (ref 3.4–5)
ALBUMIN/GLOB SERPL: 0.8 (ref 0.8–1.7)
ALP SERPL-CCNC: 96 U/L (ref 45–117)
ALT SERPL-CCNC: 24 U/L (ref 13–56)
ANION GAP SERPL CALC-SCNC: 3 MMOL/L (ref 3–18)
AST SERPL W P-5'-P-CCNC: 17 U/L (ref 10–38)
BILIRUB SERPL-MCNC: 0.4 MG/DL (ref 0.2–1)
BUN SERPL-MCNC: 29 MG/DL (ref 7–18)
BUN/CREAT SERPL: 24 (ref 12–20)
CA-I BLD-MCNC: 9.4 MG/DL (ref 8.5–10.1)
CHLORIDE SERPL-SCNC: 109 MMOL/L (ref 100–111)
CHOLEST SERPL-MCNC: 184 MG/DL
CO2 SERPL-SCNC: 27 MMOL/L (ref 21–32)
CREAT SERPL-MCNC: 1.19 MG/DL (ref 0.6–1.3)
CREAT UR-MCNC: 127 MG/DL (ref 30–125)
ERYTHROCYTE [DISTWIDTH] IN BLOOD BY AUTOMATED COUNT: 12.8 % (ref 11.6–14.5)
EST. AVERAGE GLUCOSE BLD GHB EST-MCNC: 108 MG/DL
FERRITIN SERPL-MCNC: 125 NG/ML (ref 8–388)
GLOBULIN SER CALC-MCNC: 4.7 G/DL (ref 2–4)
GLUCOSE SERPL-MCNC: 109 MG/DL (ref 74–99)
HBA1C MFR BLD: 5.4 % (ref 4.2–5.6)
HCT VFR BLD AUTO: 41.7 % (ref 35–45)
HDLC SERPL-MCNC: 59 MG/DL (ref 40–60)
HDLC SERPL: 3.1 (ref 0–5)
HGB BLD-MCNC: 12.6 G/DL (ref 12–16)
IRON SATN MFR SERPL: 26 % (ref 20–50)
IRON SERPL-MCNC: 69 UG/DL (ref 50–175)
LDLC SERPL CALC-MCNC: 107.6 MG/DL (ref 0–100)
LIPID PANEL: ABNORMAL
MCH RBC QN AUTO: 27.8 PG (ref 24–34)
MCHC RBC AUTO-ENTMCNC: 30.2 G/DL (ref 31–37)
MCV RBC AUTO: 92.1 FL (ref 78–100)
MICROALBUMIN UR-MCNC: 12.1 MG/DL (ref 0–3)
MICROALBUMIN/CREAT UR-RTO: 95 MGMALB/GCRE (ref 0–30)
NRBC # BLD: 0 K/UL (ref 0–0.01)
NRBC BLD-RTO: 0 PER 100 WBC
PLATELET # BLD AUTO: 248 K/UL (ref 135–420)
PMV BLD AUTO: 10.9 FL (ref 9.2–11.8)
POTASSIUM SERPL-SCNC: 4 MMOL/L (ref 3.5–5.5)
PROT SERPL-MCNC: 8.4 G/DL (ref 6.4–8.2)
RBC # BLD AUTO: 4.53 M/UL (ref 4.2–5.3)
SODIUM SERPL-SCNC: 139 MMOL/L (ref 136–145)
TIBC SERPL-MCNC: 268 UG/DL (ref 250–450)
TRIGL SERPL-MCNC: 87 MG/DL
TSH SERPL DL<=0.05 MIU/L-ACNC: 0.8 UIU/ML (ref 0.36–3.74)
VIT B12 SERPL-MCNC: 484 PG/ML (ref 211–911)
VLDLC SERPL CALC-MCNC: 17.4 MG/DL
WBC # BLD AUTO: 6.9 K/UL (ref 4.6–13.2)

## 2023-05-17 PROCEDURE — 82306 VITAMIN D 25 HYDROXY: CPT

## 2023-05-17 PROCEDURE — 82570 ASSAY OF URINE CREATININE: CPT

## 2023-05-17 PROCEDURE — 77063 BREAST TOMOSYNTHESIS BI: CPT

## 2023-05-17 PROCEDURE — 84443 ASSAY THYROID STIM HORMONE: CPT

## 2023-05-17 PROCEDURE — 82728 ASSAY OF FERRITIN: CPT

## 2023-05-17 PROCEDURE — 85027 COMPLETE CBC AUTOMATED: CPT

## 2023-05-17 PROCEDURE — 80061 LIPID PANEL: CPT

## 2023-05-17 PROCEDURE — 83540 ASSAY OF IRON: CPT

## 2023-05-17 PROCEDURE — 36415 COLL VENOUS BLD VENIPUNCTURE: CPT

## 2023-05-17 PROCEDURE — 83036 HEMOGLOBIN GLYCOSYLATED A1C: CPT

## 2023-05-17 PROCEDURE — 82607 VITAMIN B-12: CPT

## 2023-05-17 PROCEDURE — 80053 COMPREHEN METABOLIC PANEL: CPT

## 2023-05-17 PROCEDURE — 82043 UR ALBUMIN QUANTITATIVE: CPT

## 2023-06-08 RX ORDER — ERGOCALCIFEROL 1.25 MG/1
50000 CAPSULE ORAL WEEKLY
Qty: 12 CAPSULE | Refills: 3 | Status: SHIPPED | OUTPATIENT
Start: 2023-06-08

## 2023-10-12 RX ORDER — FUROSEMIDE 40 MG/1
40 TABLET ORAL DAILY
Qty: 90 TABLET | Refills: 0 | Status: SHIPPED | OUTPATIENT
Start: 2023-10-12

## 2023-12-28 ENCOUNTER — OFFICE VISIT (OUTPATIENT)
Dept: FAMILY MEDICINE CLINIC | Facility: CLINIC | Age: 63
End: 2023-12-28
Payer: COMMERCIAL

## 2023-12-28 VITALS
DIASTOLIC BLOOD PRESSURE: 89 MMHG | HEART RATE: 80 BPM | TEMPERATURE: 97.8 F | RESPIRATION RATE: 18 BRPM | WEIGHT: 224.4 LBS | SYSTOLIC BLOOD PRESSURE: 143 MMHG | HEIGHT: 66 IN | OXYGEN SATURATION: 98 % | BODY MASS INDEX: 36.07 KG/M2

## 2023-12-28 DIAGNOSIS — I10 ESSENTIAL (PRIMARY) HYPERTENSION: ICD-10-CM

## 2023-12-28 DIAGNOSIS — I42.9 CARDIOMYOPATHY, UNSPECIFIED TYPE (HCC): ICD-10-CM

## 2023-12-28 DIAGNOSIS — R09.81 NASAL CONGESTION: ICD-10-CM

## 2023-12-28 DIAGNOSIS — E55.9 VITAMIN D DEFICIENCY: Primary | ICD-10-CM

## 2023-12-28 PROCEDURE — 99214 OFFICE O/P EST MOD 30 MIN: CPT | Performed by: NURSE PRACTITIONER

## 2023-12-28 PROCEDURE — 3077F SYST BP >= 140 MM HG: CPT | Performed by: NURSE PRACTITIONER

## 2023-12-28 PROCEDURE — 3079F DIAST BP 80-89 MM HG: CPT | Performed by: NURSE PRACTITIONER

## 2023-12-28 RX ORDER — HYDRALAZINE HYDROCHLORIDE 50 MG/1
50 TABLET, FILM COATED ORAL 3 TIMES DAILY
COMMUNITY
Start: 2023-11-27

## 2023-12-28 RX ORDER — AZELASTINE 1 MG/ML
2 SPRAY, METERED NASAL DAILY
Qty: 60 ML | Refills: 1 | Status: SHIPPED | OUTPATIENT
Start: 2023-12-28

## 2023-12-28 NOTE — PROGRESS NOTES
Shawanda Huynh presents today for   Chief Complaint   Patient presents with    Follow-up       Is someone accompanying this pt? no    Is the patient using any DME equipment during OV? no    Depression Screenin/28/2023     1:49 PM 2023     2:23 PM 2022     2:51 PM 2022     9:12 AM   PHQ-9 Questionaire   Little interest or pleasure in doing things 0 0 0 0   Feeling down, depressed, or hopeless 3 1 0 0   Trouble falling or staying asleep, or sleeping too much 2      Feeling tired or having little energy 3      Poor appetite or overeating 0      Feeling bad about yourself - or that you are a failure or have let yourself or your family down 0      Trouble concentrating on things, such as reading the newspaper or watching television 0      Moving or speaking so slowly that other people could have noticed. Or the opposite - being so fidgety or restless that you have been moving around a lot more than usual 1      Thoughts that you would be better off dead, or of hurting yourself in some way 0      PHQ-9 Total Score 9 1 0 0       Fall Risk       No data to display                 Health Maintenance reviewed and discussed and ordered per Provider. Health Maintenance Due   Topic Date Due    HIV screen  Never done    Cervical cancer screen  Never done    DTaP/Tdap/Td vaccine (1 - Tdap) 2008    Shingles vaccine (1 of 2) Never done    Pneumococcal 0-64 years Vaccine (2 - PCV) 2014    Respiratory Syncytial Virus (RSV) Pregnant or age 61 yrs+ (1 - 1-dose 60+ series) Never done    Flu vaccine (1) 2023    COVID-19 Vaccine (3 - -24 season) 2023   . Coordination of Care:    1. \"Have you been to the ER, urgent care clinic since your last visit? Hospitalized since your last visit? \" No    2. \"Have you seen or consulted any other health care providers outside of the [address] [place] [address] since your last visit? \" No     3.  For patients aged 43-73: Has the patient had a
Where appropriate, she is instructed to call the clinic if she has not been notified either by phone or through [address] [place] [address] with the results of her tests or with an appointment plan for any referrals within 1 week(s). The patient  is to call if her condition worsens or fails to improve or if significant side effects are experienced.        Alverna Councilman, APRN - CNP

## 2024-04-11 RX ORDER — MONTELUKAST SODIUM 10 MG/1
10 TABLET ORAL DAILY
Qty: 30 TABLET | Refills: 3 | Status: SHIPPED | OUTPATIENT
Start: 2024-04-11

## 2024-04-17 RX ORDER — FLUTICASONE PROPIONATE 50 MCG
2 SPRAY, SUSPENSION (ML) NASAL DAILY
Qty: 16 G | Refills: 1 | Status: SHIPPED | OUTPATIENT
Start: 2024-04-17

## 2024-04-17 NOTE — TELEPHONE ENCOUNTER
Requested Prescriptions     Pending Prescriptions Disp Refills    fluticasone (FLONASE) 50 MCG/ACT nasal spray 16 g 1     Si sprays by Nasal route daily

## 2024-07-26 ENCOUNTER — OFFICE VISIT (OUTPATIENT)
Dept: FAMILY MEDICINE CLINIC | Facility: CLINIC | Age: 64
End: 2024-07-26

## 2024-07-26 ENCOUNTER — HOSPITAL ENCOUNTER (OUTPATIENT)
Age: 64
End: 2024-07-26
Payer: MEDICARE

## 2024-07-26 VITALS
BODY MASS INDEX: 35.74 KG/M2 | SYSTOLIC BLOOD PRESSURE: 153 MMHG | WEIGHT: 222.4 LBS | OXYGEN SATURATION: 98 % | HEART RATE: 84 BPM | HEIGHT: 66 IN | DIASTOLIC BLOOD PRESSURE: 98 MMHG | TEMPERATURE: 98 F | RESPIRATION RATE: 18 BRPM

## 2024-07-26 DIAGNOSIS — Z00.00 WELLNESS EXAMINATION: ICD-10-CM

## 2024-07-26 DIAGNOSIS — I10 ESSENTIAL (PRIMARY) HYPERTENSION: ICD-10-CM

## 2024-07-26 DIAGNOSIS — Z00.00 WELLNESS EXAMINATION: Primary | ICD-10-CM

## 2024-07-26 DIAGNOSIS — Z12.31 ENCOUNTER FOR SCREENING MAMMOGRAM FOR MALIGNANT NEOPLASM OF BREAST: ICD-10-CM

## 2024-07-26 LAB
25(OH)D3 SERPL-MCNC: 32.2 NG/ML (ref 30–100)
ALBUMIN SERPL-MCNC: 3.7 G/DL (ref 3.4–5)
ALBUMIN/GLOB SERPL: 0.9 (ref 0.8–1.7)
ALP SERPL-CCNC: 94 U/L (ref 45–117)
ALT SERPL-CCNC: 19 U/L (ref 13–56)
ANION GAP SERPL CALC-SCNC: 6 MMOL/L (ref 3–18)
AST SERPL W P-5'-P-CCNC: 17 U/L (ref 10–38)
BILIRUB SERPL-MCNC: 0.7 MG/DL (ref 0.2–1)
BUN SERPL-MCNC: 17 MG/DL (ref 7–18)
BUN/CREAT SERPL: 23 (ref 12–20)
CA-I BLD-MCNC: 9.2 MG/DL (ref 8.5–10.1)
CHLORIDE SERPL-SCNC: 108 MMOL/L (ref 100–111)
CHOLEST SERPL-MCNC: 148 MG/DL
CO2 SERPL-SCNC: 28 MMOL/L (ref 21–32)
CREAT SERPL-MCNC: 0.73 MG/DL (ref 0.6–1.3)
ERYTHROCYTE [DISTWIDTH] IN BLOOD BY AUTOMATED COUNT: 12.4 % (ref 11.6–14.5)
GLOBULIN SER CALC-MCNC: 4.3 G/DL (ref 2–4)
GLUCOSE SERPL-MCNC: 108 MG/DL (ref 74–99)
HCT VFR BLD AUTO: 40.8 % (ref 35–45)
HDLC SERPL-MCNC: 57 MG/DL (ref 40–60)
HDLC SERPL: 2.6 (ref 0–5)
HGB BLD-MCNC: 12.4 G/DL (ref 12–16)
LDLC SERPL CALC-MCNC: 80.6 MG/DL (ref 0–100)
LIPID PANEL: NORMAL
MCH RBC QN AUTO: 27.6 PG (ref 24–34)
MCHC RBC AUTO-ENTMCNC: 30.4 G/DL (ref 31–37)
MCV RBC AUTO: 90.9 FL (ref 78–100)
NRBC # BLD: 0 K/UL (ref 0–0.01)
NRBC BLD-RTO: 0 PER 100 WBC
PLATELET # BLD AUTO: 254 K/UL (ref 135–420)
PMV BLD AUTO: 10.2 FL (ref 9.2–11.8)
POTASSIUM SERPL-SCNC: 3.6 MMOL/L (ref 3.5–5.5)
PROT SERPL-MCNC: 8 G/DL (ref 6.4–8.2)
RBC # BLD AUTO: 4.49 M/UL (ref 4.2–5.3)
SODIUM SERPL-SCNC: 142 MMOL/L (ref 136–145)
TRIGL SERPL-MCNC: 52 MG/DL
TSH SERPL DL<=0.05 MIU/L-ACNC: 0.89 UIU/ML (ref 0.36–3.74)
VIT B12 SERPL-MCNC: 669 PG/ML (ref 211–911)
VLDLC SERPL CALC-MCNC: 10.4 MG/DL
WBC # BLD AUTO: 7.8 K/UL (ref 4.6–13.2)

## 2024-07-26 PROCEDURE — 82607 VITAMIN B-12: CPT

## 2024-07-26 PROCEDURE — 85027 COMPLETE CBC AUTOMATED: CPT

## 2024-07-26 PROCEDURE — 82306 VITAMIN D 25 HYDROXY: CPT

## 2024-07-26 PROCEDURE — 87389 HIV-1 AG W/HIV-1&-2 AB AG IA: CPT

## 2024-07-26 PROCEDURE — 84443 ASSAY THYROID STIM HORMONE: CPT

## 2024-07-26 PROCEDURE — 80061 LIPID PANEL: CPT

## 2024-07-26 PROCEDURE — 36415 COLL VENOUS BLD VENIPUNCTURE: CPT

## 2024-07-26 PROCEDURE — 80053 COMPREHEN METABOLIC PANEL: CPT

## 2024-07-26 RX ORDER — ERGOCALCIFEROL 1.25 MG/1
50000 CAPSULE ORAL WEEKLY
Qty: 12 CAPSULE | Refills: 3 | Status: SHIPPED | OUTPATIENT
Start: 2024-07-26

## 2024-07-26 RX ORDER — FUROSEMIDE 40 MG/1
40 TABLET ORAL DAILY
Qty: 90 TABLET | Refills: 0 | Status: SHIPPED | OUTPATIENT
Start: 2024-07-26

## 2024-07-26 ASSESSMENT — ENCOUNTER SYMPTOMS
CHEST TIGHTNESS: 0
SHORTNESS OF BREATH: 0

## 2024-07-26 NOTE — PROGRESS NOTES
Bong Del Cid is a 63 y.o. female presents with   Chief Complaint   Patient presents with    Follow-up        Diagnosis Orders   1. Wellness examination  HIV 1/2 Ag/Ab, 4TH Generation,W Rflx Confirm    CBC    Comprehensive Metabolic Panel    Lipid Panel    Vitamin D 25 Hydroxy    Vitamin B12    TSH      2. Encounter for screening mammogram for malignant neoplasm of breast  EDD EMANUEL DIGITAL SCREEN BILATERAL      3. Essential (primary) hypertension          BP (!) 153/98 (Site: Left Upper Arm, Position: Sitting, Cuff Size: Large Adult)   Pulse 84   Temp 98 °F (36.7 °C) (Temporal)   Resp 18   Ht 1.676 m (5' 6\")   Wt 100.9 kg (222 lb 6.4 oz)   SpO2 98%   BMI 35.90 kg/m²   Subjective:     Past Medical History:   Diagnosis Date    Calculus of kidney     Chest pain     pt states occ since oct 2021    Congestive heart failure (HCC)     Dyspnea     pt states on exertion since oct 2021    Essential hypertension     Goiter     Hx of seasonal allergies     Ill-defined condition     pt stated hx of weak heart valve    Menopause      Past Surgical History:   Procedure Laterality Date    CARDIAC CATHETERIZATION      pt states 10 years ago, and no stents needed    CARDIAC CATHETERIZATION  2022     SECTION      COLONOSCOPY  2019    EYE SURGERY Right 2024    IR FNA WITH IMAGING      TUBAL LIGATION       Social History     Socioeconomic History    Marital status: Single     Spouse name: None    Number of children: None    Years of education: None    Highest education level: None   Tobacco Use    Smoking status: Never    Smokeless tobacco: Never   Substance and Sexual Activity    Alcohol use: Not Currently    Drug use: Never     Social Determinants of Health     Financial Resource Strain: Low Risk  (2024)    Overall Financial Resource Strain (CARDIA)     Difficulty of Paying Living Expenses: Not hard at all   Food Insecurity: No Food Insecurity (2024)    Hunger Vital Sign     Worried About

## 2024-07-26 NOTE — PROGRESS NOTES
Bong Del Cid is a 63 y.o. female presents with   Chief Complaint   Patient presents with    Follow-up        Diagnosis   1. Vitamin D deficiency    Patient's last vitamin D level of 11 May 2023.  She relates she is currently taking her weekly vitamin D 50,000 units   2. Essential (primary) hypertension    Blood pressure not at goal -she is currently on hydralazine 50 mg 3 times daily furosemide 40 mg once daily, carvedilol 25 mg twice daily   3. Cardiomyopathy, unspecified type (HCC)    Managed by cardio currently on Entresto 97/103 twice daily     BP (!) 153/98 (Site: Left Upper Arm, Position: Sitting, Cuff Size: Large Adult)   Pulse 84   Temp 98 °F (36.7 °C) (Temporal)   Resp 18   Ht 1.676 m (5' 6\")   Wt 100.9 kg (222 lb 6.4 oz)   SpO2 98%   BMI 35.90 kg/m²   Subjective:     Past Medical History:   Diagnosis Date    Calculus of kidney     Chest pain     pt states occ since oct 2021    Congestive heart failure (HCC)     Dyspnea     pt states on exertion since oct 2021    Essential hypertension     Goiter     Hx of seasonal allergies     Ill-defined condition     pt stated hx of weak heart valve    Menopause      Past Surgical History:   Procedure Laterality Date    CARDIAC CATHETERIZATION      pt states 10 years ago, and no stents needed    CARDIAC CATHETERIZATION  2022     SECTION      COLONOSCOPY  2019    EYE SURGERY Right 2024    IR FNA WITH IMAGING      TUBAL LIGATION       Social History     Socioeconomic History    Marital status: Single     Spouse name: None    Number of children: None    Years of education: None    Highest education level: None   Tobacco Use    Smoking status: Never    Smokeless tobacco: Never   Substance and Sexual Activity    Alcohol use: Not Currently    Drug use: Never     Social Determinants of Health     Financial Resource Strain: Low Risk  (2024)    Overall Financial Resource Strain (CARDIA)     Difficulty of Paying Living Expenses: Not hard at

## 2024-07-26 NOTE — PROGRESS NOTES
Bong Del Cid presents today for   Chief Complaint   Patient presents with    Follow-up       Is someone accompanying this pt? no    Is the patient using any DME equipment during OV? no    Depression Screenin/26/2024     9:36 AM 2023     1:49 PM 2023     2:23 PM 2022     2:51 PM 2022     9:12 AM   PHQ-9 Questionaire   Little interest or pleasure in doing things 0 0 0 0 0   Feeling down, depressed, or hopeless 0 3 1 0 0   Trouble falling or staying asleep, or sleeping too much  2      Feeling tired or having little energy  3      Poor appetite or overeating  0      Feeling bad about yourself - or that you are a failure or have let yourself or your family down  0      Trouble concentrating on things, such as reading the newspaper or watching television  0      Moving or speaking so slowly that other people could have noticed. Or the opposite - being so fidgety or restless that you have been moving around a lot more than usual  1      Thoughts that you would be better off dead, or of hurting yourself in some way  0      PHQ-9 Total Score 0 9 1 0 0   If you checked off any problems, how difficult have these problems made it for you to do your work, take care of things at home, or get along with other people?  1          Fall Risk       No data to display                 Health Maintenance reviewed and discussed and ordered per Provider.    Health Maintenance Due   Topic Date Due    Pneumococcal 0-64 years Vaccine (1 of 2 - PCV) 10/10/1965    HIV screen  Never done    Cervical cancer screen  Never done    DTaP/Tdap/Td vaccine (1 - Tdap) 2008    Shingles vaccine (1 of 2) Never done    Respiratory Syncytial Virus (RSV) Pregnant or age 60 yrs+ (1 - 1-dose 60+ series) Never done    COVID-19 Vaccine (3 - - season) 2023    Annual Wellness Visit (Medicare Advantage)  Never done    Lipids  2024    GFR test (Diabetes, CKD 3-4, OR last GFR 15-59)  2024    Breast cancer

## 2024-07-29 ENCOUNTER — HOSPITAL ENCOUNTER (OUTPATIENT)
Age: 64
Discharge: HOME | End: 2024-08-01
Payer: MEDICARE

## 2024-07-29 VITALS — BODY MASS INDEX: 33.75 KG/M2 | WEIGHT: 210 LBS | HEIGHT: 66 IN

## 2024-07-29 DIAGNOSIS — Z12.31 ENCOUNTER FOR SCREENING MAMMOGRAM FOR MALIGNANT NEOPLASM OF BREAST: ICD-10-CM

## 2024-07-29 LAB
HIV 1+2 AB+HIV1 P24 AG SERPL QL IA: NONREACTIVE
HIV 1/2 RESULT COMMENT: NORMAL

## 2024-07-29 PROCEDURE — 77063 BREAST TOMOSYNTHESIS BI: CPT

## 2024-08-13 ENCOUNTER — TELEPHONE (OUTPATIENT)
Dept: FAMILY MEDICINE CLINIC | Facility: CLINIC | Age: 64
End: 2024-08-13

## 2024-08-13 NOTE — TELEPHONE ENCOUNTER
----- Message from Enrique SIN sent at 8/13/2024 10:38 AM EDT -----  Regarding: [facility] Appointment Request  [facility] Appointment Request    Patient needs appointment for E[facility] Appointment Type: Existing Condition Follow Up. 2 week up follow up    Patient Requested Dates(s): any day sooner than October  Patient Requested Time: any time  Provider Name:Zina Rene    Reason for Appointment Request: Established Patient - Available appointments did not meet patient need  --------------------------------------------------------------------------------------------------------------------------    Relationship to Patient: Self     Call Back Information: Do not leave any message, patient will call back for answer  Preferred Call Back Number: Phone ; [phone]

## 2024-08-23 ENCOUNTER — OFFICE VISIT (OUTPATIENT)
Facility: CLINIC | Age: 64
End: 2024-08-23
Payer: MEDICARE

## 2024-08-23 VITALS
WEIGHT: 218.8 LBS | DIASTOLIC BLOOD PRESSURE: 100 MMHG | TEMPERATURE: 98 F | SYSTOLIC BLOOD PRESSURE: 169 MMHG | RESPIRATION RATE: 18 BRPM | BODY MASS INDEX: 35.17 KG/M2 | OXYGEN SATURATION: 98 % | HEART RATE: 100 BPM | HEIGHT: 66 IN

## 2024-08-23 DIAGNOSIS — Z00.00 MEDICARE ANNUAL WELLNESS VISIT, SUBSEQUENT: Primary | ICD-10-CM

## 2024-08-23 DIAGNOSIS — I10 ESSENTIAL (PRIMARY) HYPERTENSION: ICD-10-CM

## 2024-08-23 PROCEDURE — 3077F SYST BP >= 140 MM HG: CPT | Performed by: NURSE PRACTITIONER

## 2024-08-23 PROCEDURE — 99213 OFFICE O/P EST LOW 20 MIN: CPT | Performed by: NURSE PRACTITIONER

## 2024-08-23 PROCEDURE — G0439 PPPS, SUBSEQ VISIT: HCPCS | Performed by: NURSE PRACTITIONER

## 2024-08-23 PROCEDURE — G8417 CALC BMI ABV UP PARAM F/U: HCPCS | Performed by: NURSE PRACTITIONER

## 2024-08-23 PROCEDURE — G8427 DOCREV CUR MEDS BY ELIG CLIN: HCPCS | Performed by: NURSE PRACTITIONER

## 2024-08-23 PROCEDURE — 3080F DIAST BP >= 90 MM HG: CPT | Performed by: NURSE PRACTITIONER

## 2024-08-23 PROCEDURE — 1036F TOBACCO NON-USER: CPT | Performed by: NURSE PRACTITIONER

## 2024-08-23 PROCEDURE — 3017F COLORECTAL CA SCREEN DOC REV: CPT | Performed by: NURSE PRACTITIONER

## 2024-08-23 ASSESSMENT — PATIENT HEALTH QUESTIONNAIRE - PHQ9
SUM OF ALL RESPONSES TO PHQ9 QUESTIONS 1 & 2: 0
6. FEELING BAD ABOUT YOURSELF - OR THAT YOU ARE A FAILURE OR HAVE LET YOURSELF OR YOUR FAMILY DOWN: NOT AT ALL
SUM OF ALL RESPONSES TO PHQ QUESTIONS 1-9: 0
5. POOR APPETITE OR OVEREATING: NOT AT ALL
SUM OF ALL RESPONSES TO PHQ QUESTIONS 1-9: 0
1. LITTLE INTEREST OR PLEASURE IN DOING THINGS: NOT AT ALL
8. MOVING OR SPEAKING SO SLOWLY THAT OTHER PEOPLE COULD HAVE NOTICED. OR THE OPPOSITE, BEING SO FIGETY OR RESTLESS THAT YOU HAVE BEEN MOVING AROUND A LOT MORE THAN USUAL: NOT AT ALL
SUM OF ALL RESPONSES TO PHQ QUESTIONS 1-9: 0
4. FEELING TIRED OR HAVING LITTLE ENERGY: NOT AT ALL
SUM OF ALL RESPONSES TO PHQ QUESTIONS 1-9: 0
2. FEELING DOWN, DEPRESSED OR HOPELESS: NOT AT ALL
9. THOUGHTS THAT YOU WOULD BE BETTER OFF DEAD, OR OF HURTING YOURSELF: NOT AT ALL
7. TROUBLE CONCENTRATING ON THINGS, SUCH AS READING THE NEWSPAPER OR WATCHING TELEVISION: NOT AT ALL
3. TROUBLE FALLING OR STAYING ASLEEP: NOT AT ALL

## 2024-08-23 ASSESSMENT — LIFESTYLE VARIABLES
HOW OFTEN DO YOU HAVE A DRINK CONTAINING ALCOHOL: NEVER
HOW MANY STANDARD DRINKS CONTAINING ALCOHOL DO YOU HAVE ON A TYPICAL DAY: PATIENT DOES NOT DRINK

## 2024-08-23 NOTE — PROGRESS NOTES
Bong Del Cid is a 63 y.o. female presents with   Chief Complaint   Patient presents with    Medicare AWV    Follow-up        Diagnosis   1. Medicare annual wellness visit, subsequent       2. Essential (primary) hypertension    Patient presents today in office for 2-week follow-up after I increased her hydralazine from 50 mg 3 times daily to 100 mg 3 times daily due to excessive blood pressure.  She states that she tried doing that however \"it made her feel sick \".  Patient does not have home BP cuff.  She is also on furosemide 40 mg once daily along with carvedilol 25 mg once daily and Entresto 97/103 twice daily     BP (!) 169/100 (Site: Right Upper Arm, Position: Sitting, Cuff Size: Large Adult)   Pulse 100   Temp 98 °F (36.7 °C) (Temporal)   Resp 18   Ht 1.676 m (5' 6\")   Wt 99.2 kg (218 lb 12.8 oz)   SpO2 98%   BMI 35.32 kg/m²   Subjective:     Past Medical History:   Diagnosis Date    Calculus of kidney     Chest pain     pt states occ since oct 2021    Congestive heart failure (HCC)     Dyspnea     pt states on exertion since oct 2021    Essential hypertension     Goiter     Hx of seasonal allergies     Ill-defined condition     pt stated hx of weak heart valve    Menopause      Past Surgical History:   Procedure Laterality Date    CARDIAC CATHETERIZATION      pt states 10 years ago, and no stents needed    CARDIAC CATHETERIZATION  2022     SECTION      COLONOSCOPY  2019    EYE SURGERY Right 2024    IR FNA WITH IMAGING      TUBAL LIGATION       Social History     Socioeconomic History    Marital status: Single     Spouse name: None    Number of children: None    Years of education: None    Highest education level: None   Tobacco Use    Smoking status: Never    Smokeless tobacco: Never   Substance and Sexual Activity    Alcohol use: Not Currently    Drug use: Never     Social Determinants of Health     Financial Resource Strain: Low Risk  (2024)    Overall Financial

## 2024-08-23 NOTE — PROGRESS NOTES
Bong Del Cid presents today for   Chief Complaint   Patient presents with    Medicare AWV    Follow-up       Is someone accompanying this pt? no    Is the patient using any DME equipment during OV? no    Depression Screenin/23/2024     2:03 PM 2024     9:36 AM 2023     1:49 PM 2023     2:23 PM 2022     2:51 PM 2022     9:12 AM   PHQ-9 Questionaire   Little interest or pleasure in doing things 0 0 0 0 0 0   Feeling down, depressed, or hopeless 0 0 3 1 0 0   Trouble falling or staying asleep, or sleeping too much 0  2      Feeling tired or having little energy 0  3      Poor appetite or overeating 0  0      Feeling bad about yourself - or that you are a failure or have let yourself or your family down 0  0      Trouble concentrating on things, such as reading the newspaper or watching television 0  0      Moving or speaking so slowly that other people could have noticed. Or the opposite - being so fidgety or restless that you have been moving around a lot more than usual 0  1      Thoughts that you would be better off dead, or of hurting yourself in some way 0  0      PHQ-9 Total Score 0 0 9 1 0 0   If you checked off any problems, how difficult have these problems made it for you to do your work, take care of things at home, or get along with other people? 0  1          Fall Risk      2024     2:02 PM   Fall Risk   Do you feel unsteady or are you worried about falling?  no   2 or more falls in past year? no   Fall with injury in past year? no        Health Maintenance reviewed and discussed and ordered per Provider.    Health Maintenance Due   Topic Date Due    Pneumococcal 0-64 years Vaccine (1 of 2 - PCV) 10/10/1965    Cervical cancer screen  Never done    DTaP/Tdap/Td vaccine (1 - Tdap) 2008    Shingles vaccine (1 of 2) Never done    Respiratory Syncytial Virus (RSV) Pregnant or age 60 yrs+ (1 - 1-dose 60+ series) Never done    COVID-19 Vaccine (3 - - season)

## 2024-09-13 ENCOUNTER — OFFICE VISIT (OUTPATIENT)
Facility: CLINIC | Age: 64
End: 2024-09-13
Payer: MEDICARE

## 2024-09-13 ENCOUNTER — TELEPHONE (OUTPATIENT)
Dept: FAMILY MEDICINE CLINIC | Facility: CLINIC | Age: 64
End: 2024-09-13

## 2024-09-13 VITALS
DIASTOLIC BLOOD PRESSURE: 87 MMHG | TEMPERATURE: 98.6 F | SYSTOLIC BLOOD PRESSURE: 134 MMHG | WEIGHT: 218 LBS | HEIGHT: 66 IN | BODY MASS INDEX: 35.03 KG/M2 | OXYGEN SATURATION: 98 % | HEART RATE: 89 BPM | RESPIRATION RATE: 18 BRPM

## 2024-09-13 DIAGNOSIS — I10 ESSENTIAL (PRIMARY) HYPERTENSION: Primary | ICD-10-CM

## 2024-09-13 PROCEDURE — 3079F DIAST BP 80-89 MM HG: CPT | Performed by: NURSE PRACTITIONER

## 2024-09-13 PROCEDURE — 3075F SYST BP GE 130 - 139MM HG: CPT | Performed by: NURSE PRACTITIONER

## 2024-09-13 PROCEDURE — 1036F TOBACCO NON-USER: CPT | Performed by: NURSE PRACTITIONER

## 2024-09-13 PROCEDURE — G8427 DOCREV CUR MEDS BY ELIG CLIN: HCPCS | Performed by: NURSE PRACTITIONER

## 2024-09-13 PROCEDURE — G8417 CALC BMI ABV UP PARAM F/U: HCPCS | Performed by: NURSE PRACTITIONER

## 2024-09-13 PROCEDURE — 99213 OFFICE O/P EST LOW 20 MIN: CPT | Performed by: NURSE PRACTITIONER

## 2024-09-13 PROCEDURE — 3017F COLORECTAL CA SCREEN DOC REV: CPT | Performed by: NURSE PRACTITIONER

## 2024-09-13 ASSESSMENT — ENCOUNTER SYMPTOMS
SHORTNESS OF BREATH: 0
CHEST TIGHTNESS: 0

## 2025-01-27 ENCOUNTER — OFFICE VISIT (OUTPATIENT)
Dept: FAMILY MEDICINE CLINIC | Facility: CLINIC | Age: 65
End: 2025-01-27

## 2025-01-27 VITALS
BODY MASS INDEX: 33.17 KG/M2 | WEIGHT: 206.4 LBS | HEIGHT: 66 IN | SYSTOLIC BLOOD PRESSURE: 136 MMHG | RESPIRATION RATE: 18 BRPM | HEART RATE: 88 BPM | OXYGEN SATURATION: 98 % | TEMPERATURE: 97.8 F | DIASTOLIC BLOOD PRESSURE: 80 MMHG

## 2025-01-27 DIAGNOSIS — I10 ESSENTIAL (PRIMARY) HYPERTENSION: ICD-10-CM

## 2025-01-27 DIAGNOSIS — Z12.11 SCREEN FOR COLON CANCER: ICD-10-CM

## 2025-01-27 DIAGNOSIS — Z78.0 POST-MENOPAUSAL: ICD-10-CM

## 2025-01-27 DIAGNOSIS — I50.22 CHRONIC SYSTOLIC (CONGESTIVE) HEART FAILURE (HCC): ICD-10-CM

## 2025-01-27 DIAGNOSIS — Z00.00 MEDICARE ANNUAL WELLNESS VISIT, SUBSEQUENT: Primary | ICD-10-CM

## 2025-01-27 DIAGNOSIS — Z13.1 SCREENING FOR DIABETES MELLITUS: ICD-10-CM

## 2025-01-27 RX ORDER — DAPAGLIFLOZIN 10 MG/1
10 TABLET, FILM COATED ORAL DAILY
COMMUNITY
Start: 2024-12-26

## 2025-01-27 SDOH — ECONOMIC STABILITY: FOOD INSECURITY: WITHIN THE PAST 12 MONTHS, THE FOOD YOU BOUGHT JUST DIDN'T LAST AND YOU DIDN'T HAVE MONEY TO GET MORE.: NEVER TRUE

## 2025-01-27 SDOH — ECONOMIC STABILITY: FOOD INSECURITY: WITHIN THE PAST 12 MONTHS, YOU WORRIED THAT YOUR FOOD WOULD RUN OUT BEFORE YOU GOT MONEY TO BUY MORE.: NEVER TRUE

## 2025-01-27 ASSESSMENT — PATIENT HEALTH QUESTIONNAIRE - PHQ9
2. FEELING DOWN, DEPRESSED OR HOPELESS: NOT AT ALL
SUM OF ALL RESPONSES TO PHQ QUESTIONS 1-9: 0
SUM OF ALL RESPONSES TO PHQ QUESTIONS 1-9: 0
SUM OF ALL RESPONSES TO PHQ9 QUESTIONS 1 & 2: 0
SUM OF ALL RESPONSES TO PHQ QUESTIONS 1-9: 0
SUM OF ALL RESPONSES TO PHQ QUESTIONS 1-9: 0
1. LITTLE INTEREST OR PLEASURE IN DOING THINGS: NOT AT ALL

## 2025-01-27 ASSESSMENT — LIFESTYLE VARIABLES
HOW MANY STANDARD DRINKS CONTAINING ALCOHOL DO YOU HAVE ON A TYPICAL DAY: PATIENT DOES NOT DRINK
HOW OFTEN DO YOU HAVE A DRINK CONTAINING ALCOHOL: NEVER

## 2025-01-27 ASSESSMENT — ENCOUNTER SYMPTOMS
SHORTNESS OF BREATH: 0
CHEST TIGHTNESS: 0

## 2025-01-27 NOTE — PROGRESS NOTES
Bong Del Cid presents today for   Chief Complaint   Patient presents with    Medicare AWV       Is someone accompanying this pt? no    Is the patient using any DME equipment during OV? no    Depression Screenin/27/2025    11:12 AM 2024     2:03 PM 2024     9:36 AM 2023     1:49 PM 2023     2:23 PM 2022     2:51 PM 2022     9:12 AM   PHQ-9 Questionaire   Little interest or pleasure in doing things 0 0 0 0 0 0 0   Feeling down, depressed, or hopeless 0 0 0 3 1 0 0   Trouble falling or staying asleep, or sleeping too much  0  2      Feeling tired or having little energy  0  3      Poor appetite or overeating  0  0      Feeling bad about yourself - or that you are a failure or have let yourself or your family down  0  0      Trouble concentrating on things, such as reading the newspaper or watching television  0  0      Moving or speaking so slowly that other people could have noticed. Or the opposite - being so fidgety or restless that you have been moving around a lot more than usual  0  1      Thoughts that you would be better off dead, or of hurting yourself in some way  0  0      PHQ-9 Total Score 0 0 0 9 1 0 0   If you checked off any problems, how difficult have these problems made it for you to do your work, take care of things at home, or get along with other people?  0  1          Fall Risk      2025    11:12 AM 2024     2:02 PM   Fall Risk   Do you feel unsteady or are you worried about falling?  no no   2 or more falls in past year? no no   Fall with injury in past year? no no        Health Maintenance reviewed and discussed and ordered per Provider.    Health Maintenance Due   Topic Date Due    Pneumococcal 65+ years Vaccine (1 of 2 - PCV) 10/10/1965    Cervical cancer screen  Never done    DTaP/Tdap/Td vaccine (1 - Tdap) 2008    Shingles vaccine (1 of 2) Never done    DEXA (modify frequency per FRAX score)  Never done    Respiratory Syncytial

## 2025-01-27 NOTE — PROGRESS NOTES
Bong Del Cid is a 64 y.o. female presents with   Chief Complaint   Patient presents with    Medicare AW        Diagnosis   1. Medicare annual wellness visit, subsequent       2. Post-menopausal       3. Screen for colon cancer       4. Essential (primary) hypertension    Blood pressure well-controlled patient is on furosemide 40 mg once daily, hydralazine 50 mg 3 times daily, carvedilol 25 mg twice daily as well as Entresto 97/103 mg twice daily all managed by cardiology   5. Screening for diabetes mellitus       6. Chronic systolic (congestive) heart failure (HCC)    patient is on furosemide 40 mg once daily, hydralazine 50 mg 3 times daily, carvedilol 25 mg twice daily as well as Entresto 97/103 mg twice daily as well as Farxiga 10 mg once daily all managed by cardiology     /80 (Site: Left Upper Arm, Position: Sitting, Cuff Size: Large Adult)   Pulse 88   Temp 97.8 °F (36.6 °C) (Temporal)   Resp 18   Ht 1.676 m (5' 6\")   Wt 93.6 kg (206 lb 6.4 oz)   SpO2 98%   BMI 33.31 kg/m²   Subjective:     Past Medical History:   Diagnosis Date    Calculus of kidney     Chest pain     pt states occ since oct 2021    Congestive heart failure (HCC)     Dyspnea     pt states on exertion since oct 2021    Essential hypertension     Goiter     Hx of seasonal allergies     Ill-defined condition     pt stated hx of weak heart valve    Menopause      Past Surgical History:   Procedure Laterality Date    CARDIAC CATHETERIZATION      pt states 10 years ago, and no stents needed    CARDIAC CATHETERIZATION  2022     SECTION      COLONOSCOPY  2019    EYE SURGERY Right 2024    IR GUIDED FNA      TUBAL LIGATION       Social History     Socioeconomic History    Marital status: Single     Spouse name: None    Number of children: None    Years of education: None    Highest education level: None   Tobacco Use    Smoking status: Never    Smokeless tobacco: Never   Substance and Sexual Activity    Alcohol

## 2025-01-27 NOTE — PATIENT INSTRUCTIONS
medicine.     If your doctor recommends aspirin, take the amount directed each day. Make sure you take aspirin and not another kind of pain reliever, such as acetaminophen (Tylenol).   When should you call for help?   Call 911 if you have symptoms of a heart attack. These may include:    Chest pain or pressure, or a strange feeling in the chest.     Sweating.     Shortness of breath.     Pain, pressure, or a strange feeling in the back, neck, jaw, or upper belly or in one or both shoulders or arms.     Lightheadedness or sudden weakness.     A fast or irregular heartbeat.   After you call 911, the  may tell you to chew 1 adult-strength or 2 to 4 low-dose aspirin. Wait for an ambulance. Do not try to drive yourself.  Watch closely for changes in your health, and be sure to contact your doctor if you have any problems.  Where can you learn more?  Go to https://www.[employer].net/patientEd and enter F075 to learn more about \"A Healthy Heart: Care Instructions.\"  Current as of: July 31, 2024  Content Version: 14.3  © 2024 TM.   Care instructions adapted under license by [employer]. If you have questions about a medical condition or this instruction, always ask your healthcare professional. [employer] disclaims any warranty or liability for your use of this information.    Personalized Preventive Plan for Bong Del Cid - 1/27/2025  Medicare offers a range of preventive health benefits. Some of the tests and screenings are paid in full while other may be subject to a deductible, co-insurance, and/or copay.  Some of these benefits include a comprehensive review of your medical history including lifestyle, illnesses that may run in your family, and various assessments and screenings as appropriate.  After reviewing your medical record and screening and assessments performed today your provider may have ordered immunizations, labs, imaging, and/or referrals for you.  A list of

## 2025-01-30 ENCOUNTER — TELEPHONE (OUTPATIENT)
Age: 65
End: 2025-01-30

## 2025-01-30 NOTE — TELEPHONE ENCOUNTER
Referral for a screening colonoscopy. Please review and advise.    Referral  Referral # [id number]  Referral Information    Referral # Creation Date Referral Status Status Update    [id number] 01/27/2025 Open 01/27/2025: Status History     Status Reason Referral Type Referral Reasons Referral Class   none Eval and Treat Specialty Services Required Internal     To Specialty To Provider To Location/Place of Service To Department   Gastroenterology Andrei Card MD none [facility] - GASTROENTEROLOGY     To Vendor Referred By By Location/Place of Service By Department   none Zina Rene APRN - CNP [facility]     Priority Start Date Expiration Date Referral Entered By   Routine 01/27/2025 01/27/2026 Zina Rene APRN - CNP     Visits Requested Visits Authorized Visits Completed Visits Scheduled   1 1       Coverages    Payer Plan Auth. Required? Covered? Member # Authorized From Expires Auth # Precert. # Comment   [facility] MEDICARE [facility] DUAL COMPLETE -- Covered [id number] 4/1/2024 -- -- -- --     Referral Information    Referral # Creation Date Referral Status Status Update    [id number] 01/27/2025 Open 01/27/2025: Status History     Status Reason Referral Type Referral Reasons Referral Class   none Eval and Treat Specialty Services Required Internal     To Specialty To Provider To Location/Place of Service To Department   Gastroenterology Andrei Card MD none [facility] - GASTROENTEROLOGY     To Vendor Referred By By Location/Place of Service By Department   none Zina Rene APRN - CNP [facility]     Priority Start Date Expiration Date Referral Entered By   Routine 01/27/2025 01/27/2026 Zina Rene APRN - CNP     Visits Requested Visits Authorized Visits Completed Visits Scheduled   1 1       Procedure Information    Service Details  Procedure Modifiers Provider Requested

## 2025-05-05 RX ORDER — MONTELUKAST SODIUM 10 MG/1
10 TABLET ORAL DAILY
Qty: 30 TABLET | Refills: 0 | Status: SHIPPED | OUTPATIENT
Start: 2025-05-05

## 2025-06-25 ENCOUNTER — APPOINTMENT (OUTPATIENT)
Age: 65
End: 2025-06-25
Payer: MEDICARE

## 2025-06-25 ENCOUNTER — HOSPITAL ENCOUNTER (OUTPATIENT)
Age: 65
Setting detail: OBSERVATION
Discharge: HOME | End: 2025-06-26
Attending: STUDENT IN AN ORGANIZED HEALTH CARE EDUCATION/TRAINING PROGRAM | Admitting: INTERNAL MEDICINE
Payer: MEDICARE

## 2025-06-25 DIAGNOSIS — R07.9 CHEST PAIN, UNSPECIFIED TYPE: Primary | ICD-10-CM

## 2025-06-25 DIAGNOSIS — R06.02 SHORTNESS OF BREATH: ICD-10-CM

## 2025-06-25 LAB
ALBUMIN SERPL-MCNC: 3.8 G/DL (ref 3.4–5)
ALBUMIN/GLOB SERPL: 0.9
ALP SERPL-CCNC: 113 U/L (ref 45–117)
ALT SERPL-CCNC: 62 U/L (ref 10–35)
ANION GAP SERPL CALC-SCNC: 16 MMOL/L
AST SERPL W P-5'-P-CCNC: 60 U/L (ref 10–38)
BASOPHILS # BLD: 0.05 K/UL (ref 0–0.1)
BASOPHILS NFR BLD: 0.4 % (ref 0–2)
BILIRUB SERPL-MCNC: 1.1 MG/DL (ref 0.2–1)
BNP SERPL-MCNC: 3426 PG/ML (ref 36–900)
BUN SERPL-MCNC: 20 MG/DL (ref 6–23)
BUN/CREAT SERPL: 15
CA-I BLD-MCNC: 10 MG/DL (ref 8.5–10.1)
CHLORIDE SERPL-SCNC: 104 MMOL/L (ref 98–107)
CO2 SERPL-SCNC: 23 MMOL/L (ref 21–32)
CREAT SERPL-MCNC: 1.33 MG/DL (ref 0.6–1.3)
DIFFERENTIAL METHOD BLD: ABNORMAL
EOSINOPHIL # BLD: 0.07 K/UL (ref 0–0.4)
EOSINOPHIL NFR BLD: 0.5 % (ref 0–5)
ERYTHROCYTE [DISTWIDTH] IN BLOOD BY AUTOMATED COUNT: 12.5 % (ref 11.6–14.5)
GLOBULIN SER CALC-MCNC: 4.1 G/DL
GLUCOSE BLD STRIP.AUTO-MCNC: 143 MG/DL (ref 70–110)
GLUCOSE SERPL-MCNC: 70 MG/DL (ref 74–108)
HCT VFR BLD AUTO: 42.4 % (ref 35–45)
HGB BLD-MCNC: 12.7 G/DL (ref 12–16)
IMM GRANULOCYTES # BLD AUTO: 0.05 K/UL (ref 0–0.04)
IMM GRANULOCYTES NFR BLD AUTO: 0.4 % (ref 0–0.5)
LYMPHOCYTES # BLD: 1.9 K/UL (ref 0.9–3.6)
LYMPHOCYTES NFR BLD: 14.5 % (ref 21–52)
MAGNESIUM SERPL-MCNC: 1.7 MG/DL (ref 1.6–2.6)
MCH RBC QN AUTO: 27.2 PG (ref 24–34)
MCHC RBC AUTO-ENTMCNC: 30 G/DL (ref 31–37)
MCV RBC AUTO: 90.8 FL (ref 78–100)
MONOCYTES # BLD: 0.29 K/UL (ref 0.05–1.2)
MONOCYTES NFR BLD: 2.2 % (ref 3–10)
NEUTS SEG # BLD: 10.7 K/UL (ref 1.8–8)
NEUTS SEG NFR BLD: 82 % (ref 40–73)
NRBC # BLD: 0 K/UL (ref 0–0.01)
NRBC BLD-RTO: 0 PER 100 WBC
PERFORMED BY:: ABNORMAL
PLATELET # BLD AUTO: 228 K/UL (ref 135–420)
PMV BLD AUTO: 10.8 FL (ref 9.2–11.8)
POTASSIUM SERPL-SCNC: 3.9 MMOL/L (ref 3.5–5.5)
PROT SERPL-MCNC: 7.8 G/DL (ref 6.4–8.2)
RBC # BLD AUTO: 4.67 M/UL (ref 4.2–5.3)
SODIUM SERPL-SCNC: 142 MMOL/L (ref 136–145)
TROPONIN T SERPL HS-MCNC: 36.5 NG/L (ref 0–14)
TROPONIN T SERPL HS-MCNC: 53.2 NG/L (ref 0–14)
WBC # BLD AUTO: 13.1 K/UL (ref 4.6–13.2)

## 2025-06-25 PROCEDURE — G0378 HOSPITAL OBSERVATION PER HR: HCPCS

## 2025-06-25 PROCEDURE — 82962 GLUCOSE BLOOD TEST: CPT

## 2025-06-25 PROCEDURE — 83735 ASSAY OF MAGNESIUM: CPT

## 2025-06-25 PROCEDURE — 99285 EMERGENCY DEPT VISIT HI MDM: CPT

## 2025-06-25 PROCEDURE — 6370000000 HC RX 637 (ALT 250 FOR IP): Performed by: STUDENT IN AN ORGANIZED HEALTH CARE EDUCATION/TRAINING PROGRAM

## 2025-06-25 PROCEDURE — 71045 X-RAY EXAM CHEST 1 VIEW: CPT

## 2025-06-25 PROCEDURE — 84443 ASSAY THYROID STIM HORMONE: CPT

## 2025-06-25 PROCEDURE — 93005 ELECTROCARDIOGRAM TRACING: CPT | Performed by: STUDENT IN AN ORGANIZED HEALTH CARE EDUCATION/TRAINING PROGRAM

## 2025-06-25 PROCEDURE — 83880 ASSAY OF NATRIURETIC PEPTIDE: CPT

## 2025-06-25 PROCEDURE — 85025 COMPLETE CBC W/AUTO DIFF WBC: CPT

## 2025-06-25 PROCEDURE — 84484 ASSAY OF TROPONIN QUANT: CPT

## 2025-06-25 PROCEDURE — 80053 COMPREHEN METABOLIC PANEL: CPT

## 2025-06-25 RX ORDER — ONDANSETRON 2 MG/ML
4 INJECTION INTRAMUSCULAR; INTRAVENOUS EVERY 6 HOURS PRN
Status: DISCONTINUED | OUTPATIENT
Start: 2025-06-25 | End: 2025-06-26 | Stop reason: HOSPADM

## 2025-06-25 RX ORDER — ASPIRIN 81 MG/1
162 TABLET, CHEWABLE ORAL
Status: COMPLETED | OUTPATIENT
Start: 2025-06-25 | End: 2025-06-25

## 2025-06-25 RX ORDER — SODIUM CHLORIDE 0.9 % (FLUSH) 0.9 %
5-40 SYRINGE (ML) INJECTION EVERY 12 HOURS SCHEDULED
Status: DISCONTINUED | OUTPATIENT
Start: 2025-06-26 | End: 2025-06-26 | Stop reason: HOSPADM

## 2025-06-25 RX ORDER — ACETAMINOPHEN 325 MG/1
650 TABLET ORAL EVERY 6 HOURS PRN
Status: DISCONTINUED | OUTPATIENT
Start: 2025-06-25 | End: 2025-06-26 | Stop reason: HOSPADM

## 2025-06-25 RX ORDER — FUROSEMIDE 40 MG/1
40 TABLET ORAL DAILY
Status: DISCONTINUED | OUTPATIENT
Start: 2025-06-26 | End: 2025-06-26 | Stop reason: HOSPADM

## 2025-06-25 RX ORDER — FUROSEMIDE 10 MG/ML
20 INJECTION INTRAMUSCULAR; INTRAVENOUS ONCE
Status: COMPLETED | OUTPATIENT
Start: 2025-06-26 | End: 2025-06-26

## 2025-06-25 RX ORDER — SODIUM CHLORIDE 9 MG/ML
INJECTION, SOLUTION INTRAVENOUS PRN
Status: DISCONTINUED | OUTPATIENT
Start: 2025-06-25 | End: 2025-06-26 | Stop reason: HOSPADM

## 2025-06-25 RX ORDER — SODIUM CHLORIDE 0.9 % (FLUSH) 0.9 %
5-40 SYRINGE (ML) INJECTION PRN
Status: DISCONTINUED | OUTPATIENT
Start: 2025-06-25 | End: 2025-06-26 | Stop reason: HOSPADM

## 2025-06-25 RX ORDER — FLUTICASONE PROPIONATE 50 MCG
2 SPRAY, SUSPENSION (ML) NASAL DAILY PRN
Status: DISCONTINUED | OUTPATIENT
Start: 2025-06-25 | End: 2025-06-26 | Stop reason: HOSPADM

## 2025-06-25 RX ORDER — ACETAMINOPHEN 650 MG/1
650 SUPPOSITORY RECTAL EVERY 6 HOURS PRN
Status: DISCONTINUED | OUTPATIENT
Start: 2025-06-25 | End: 2025-06-26 | Stop reason: HOSPADM

## 2025-06-25 RX ORDER — HYDRALAZINE HYDROCHLORIDE 25 MG/1
50 TABLET, FILM COATED ORAL 3 TIMES DAILY
Status: DISCONTINUED | OUTPATIENT
Start: 2025-06-26 | End: 2025-06-26 | Stop reason: HOSPADM

## 2025-06-25 RX ORDER — ENOXAPARIN SODIUM 100 MG/ML
40 INJECTION SUBCUTANEOUS DAILY
Status: DISCONTINUED | OUTPATIENT
Start: 2025-06-26 | End: 2025-06-26 | Stop reason: HOSPADM

## 2025-06-25 RX ORDER — ONDANSETRON 4 MG/1
4 TABLET, ORALLY DISINTEGRATING ORAL EVERY 8 HOURS PRN
Status: DISCONTINUED | OUTPATIENT
Start: 2025-06-25 | End: 2025-06-26 | Stop reason: HOSPADM

## 2025-06-25 RX ORDER — CARVEDILOL 12.5 MG/1
25 TABLET ORAL 2 TIMES DAILY WITH MEALS
Status: DISCONTINUED | OUTPATIENT
Start: 2025-06-26 | End: 2025-06-26 | Stop reason: HOSPADM

## 2025-06-25 RX ORDER — ATORVASTATIN CALCIUM 40 MG/1
40 TABLET, FILM COATED ORAL DAILY
Status: DISCONTINUED | OUTPATIENT
Start: 2025-06-26 | End: 2025-06-26 | Stop reason: HOSPADM

## 2025-06-25 RX ORDER — ASPIRIN 81 MG/1
81 TABLET ORAL DAILY
Status: DISCONTINUED | OUTPATIENT
Start: 2025-06-26 | End: 2025-06-26

## 2025-06-25 RX ORDER — AZELASTINE 1 MG/ML
2 SPRAY, METERED NASAL DAILY
Status: DISCONTINUED | OUTPATIENT
Start: 2025-06-26 | End: 2025-06-26 | Stop reason: HOSPADM

## 2025-06-25 RX ORDER — MONTELUKAST SODIUM 10 MG/1
10 TABLET ORAL DAILY
Status: DISCONTINUED | OUTPATIENT
Start: 2025-06-26 | End: 2025-06-26 | Stop reason: HOSPADM

## 2025-06-25 RX ORDER — POLYETHYLENE GLYCOL 3350 17 G/17G
17 POWDER, FOR SOLUTION ORAL DAILY PRN
Status: DISCONTINUED | OUTPATIENT
Start: 2025-06-25 | End: 2025-06-26 | Stop reason: HOSPADM

## 2025-06-25 RX ADMIN — ASPIRIN 162 MG: 81 TABLET, CHEWABLE ORAL at 18:28

## 2025-06-25 ASSESSMENT — PAIN SCALES - GENERAL: PAINLEVEL_OUTOF10: 3

## 2025-06-25 ASSESSMENT — ENCOUNTER SYMPTOMS
CHEST TIGHTNESS: 1
DIARRHEA: 0
BACK PAIN: 0
NAUSEA: 0
SHORTNESS OF BREATH: 1
ABDOMINAL PAIN: 0
EYE PAIN: 0
VOMITING: 0

## 2025-06-25 ASSESSMENT — PAIN - FUNCTIONAL ASSESSMENT: PAIN_FUNCTIONAL_ASSESSMENT: 0-10

## 2025-06-25 ASSESSMENT — PAIN DESCRIPTION - LOCATION: LOCATION: CHEST

## 2025-06-25 ASSESSMENT — PAIN DESCRIPTION - DESCRIPTORS: DESCRIPTORS: ACHING

## 2025-06-25 NOTE — ED TRIAGE NOTES
Patient was brought in via EMS for complaint of chest tightness and felt like her heart was racing. EMS reported patient was in A-fib with RVR with HR up to 200. Upon arrival patient was in sinus tach. Reports chest pain 3/10 and aching.

## 2025-06-25 NOTE — ED PROVIDER NOTES
[facility] EMERGENCY DEPT  EMERGENCY DEPARTMENT HISTORY AND PHYSICAL EXAM      Date of evaluation: 6/25/2025  Patient Name: Bong Del Cid  Birthdate 1960  MRN: [id number]  ED Provider: Lisandro Ornelas MD   Note Started: 6:20 PM EDT 6/25/25    HISTORY OF PRESENT ILLNESS     Chief Complaint   Patient presents with    Chest Pain       History Provided By: Patient, only     HPI: Bong Del Cid is a 64 y.o. female here for evaluation of chest discomfort.  History of CHF, hypertension, hyperlipidemia.  States that she was walking when she developed chest heaviness as well as sweating.  At that time felt some shortness of breath.  She took 2 baby aspirin and called EMS.  Per EMS patient with A-fib.  No reported history of A-fib.  EMS brings EKG.  Does appear to be in a regular rhythm without any discernible P waves however on monitor patient remains tachycardic with what appears to be P waves.  No ongoing chest discomfort.  No lower extremity swelling.  No history of DVT or PE.  No sharp chest pains.    REVIEW OF SYSTEMS   Review of Systems   Constitutional:  Negative for chills and fever.   HENT:  Negative for congestion.    Eyes:  Negative for pain and visual disturbance.   Respiratory:  Positive for chest tightness and shortness of breath.    Cardiovascular:  Negative for leg swelling.   Gastrointestinal:  Negative for abdominal pain, diarrhea, nausea and vomiting.   Genitourinary:  Negative for dysuria and frequency.   Musculoskeletal:  Negative for back pain.   Neurological:  Negative for weakness and numbness.   Psychiatric/Behavioral:  Negative for confusion.         Positives and Pertinent negatives as per HPI.    PAST MEDICAL HISTORY   Past Medical History:  Past Medical History:   Diagnosis Date    Calculus of kidney     Chest pain     pt states occ since oct 2021    Congestive heart failure (HCC)     Dyspnea     pt states on exertion since oct 2021    Essential hypertension     Goiter     Hx of seasonal allergies

## 2025-06-26 ENCOUNTER — APPOINTMENT (OUTPATIENT)
Age: 65
End: 2025-06-26
Payer: MEDICARE

## 2025-06-26 VITALS
HEART RATE: 87 BPM | SYSTOLIC BLOOD PRESSURE: 133 MMHG | WEIGHT: 200 LBS | HEIGHT: 66 IN | TEMPERATURE: 98.2 F | DIASTOLIC BLOOD PRESSURE: 85 MMHG | RESPIRATION RATE: 17 BRPM | BODY MASS INDEX: 32.14 KG/M2 | OXYGEN SATURATION: 96 %

## 2025-06-26 LAB
ANION GAP SERPL CALC-SCNC: 11 MMOL/L
BASOPHILS # BLD: 0.03 K/UL (ref 0–0.1)
BASOPHILS NFR BLD: 0.3 % (ref 0–2)
BUN SERPL-MCNC: 26 MG/DL (ref 6–23)
BUN/CREAT SERPL: 19
CA-I BLD-MCNC: 9.4 MG/DL (ref 8.5–10.1)
CHLORIDE SERPL-SCNC: 104 MMOL/L (ref 98–107)
CHOLEST SERPL-MCNC: 145 MG/DL
CO2 SERPL-SCNC: 26 MMOL/L (ref 21–32)
CREAT SERPL-MCNC: 1.38 MG/DL (ref 0.6–1.3)
DIFFERENTIAL METHOD BLD: ABNORMAL
ECHO AO ASC DIAM: 3.8 CM
ECHO AO ASCENDING AORTA INDEX: 1.9 CM/M2
ECHO AO ROOT DIAM: 3.3 CM
ECHO AO ROOT INDEX: 1.65 CM/M2
ECHO AV AREA PEAK VELOCITY: 2.9 CM2
ECHO AV AREA VTI: 3.1 CM2
ECHO AV AREA/BSA PEAK VELOCITY: 1.5 CM2/M2
ECHO AV AREA/BSA VTI: 1.6 CM2/M2
ECHO AV MEAN GRADIENT: 6 MMHG
ECHO AV MEAN VELOCITY: 1.2 M/S
ECHO AV PEAK GRADIENT: 9 MMHG
ECHO AV PEAK VELOCITY: 1.5 M/S
ECHO AV VELOCITY RATIO: 0.73
ECHO AV VTI: 30.8 CM
ECHO BSA: 2.06 M2
ECHO EST RA PRESSURE: 3 MMHG
ECHO IVC PROX: 1.7 CM
ECHO LA AREA 2C: 33.4 CM2
ECHO LA AREA 4C: 44 CM2
ECHO LA DIAMETER INDEX: 2.4 CM/M2
ECHO LA DIAMETER: 4.8 CM
ECHO LA MAJOR AXIS: 8.2 CM
ECHO LA MINOR AXIS: 6.9 CM
ECHO LA TO AORTIC ROOT RATIO: 1.45
ECHO LA VOL BP: 175 ML (ref 22–52)
ECHO LA VOL MOD A2C: 134 ML (ref 22–52)
ECHO LA VOL MOD A4C: 195 ML (ref 22–52)
ECHO LA VOL/BSA BIPLANE: 88 ML/M2 (ref 16–34)
ECHO LA VOLUME INDEX MOD A2C: 67 ML/M2 (ref 16–34)
ECHO LA VOLUME INDEX MOD A4C: 98 ML/M2 (ref 16–34)
ECHO LV E' LATERAL VELOCITY: 8.7 CM/S
ECHO LV E' SEPTAL VELOCITY: 7.51 CM/S
ECHO LV EDV A2C: 71 ML
ECHO LV EDV A4C: 77 ML
ECHO LV EDV INDEX A4C: 39 ML/M2
ECHO LV EDV NDEX A2C: 36 ML/M2
ECHO LV EF PHYSICIAN: 55 %
ECHO LV EJECTION FRACTION A2C: 49 %
ECHO LV EJECTION FRACTION A4C: 52 %
ECHO LV EJECTION FRACTION BIPLANE: 52 % (ref 55–100)
ECHO LV ESV A2C: 36 ML
ECHO LV ESV A4C: 37 ML
ECHO LV ESV INDEX A2C: 18 ML/M2
ECHO LV ESV INDEX A4C: 19 ML/M2
ECHO LV FRACTIONAL SHORTENING: 24 % (ref 28–44)
ECHO LV INTERNAL DIMENSION DIASTOLE INDEX: 2.7 CM/M2
ECHO LV INTERNAL DIMENSION DIASTOLIC: 5.4 CM (ref 3.9–5.3)
ECHO LV INTERNAL DIMENSION SYSTOLIC INDEX: 2.05 CM/M2
ECHO LV INTERNAL DIMENSION SYSTOLIC: 4.1 CM
ECHO LV IVSD: 1.2 CM (ref 0.6–0.9)
ECHO LV MASS 2D: 264.4 G (ref 67–162)
ECHO LV MASS INDEX 2D: 132.2 G/M2 (ref 43–95)
ECHO LV POSTERIOR WALL DIASTOLIC: 1.2 CM (ref 0.6–0.9)
ECHO LV RELATIVE WALL THICKNESS RATIO: 0.44
ECHO LVOT AREA: 3.8 CM2
ECHO LVOT AV VTI INDEX: 0.81
ECHO LVOT DIAM: 2.2 CM
ECHO LVOT MEAN GRADIENT: 3 MMHG
ECHO LVOT PEAK GRADIENT: 5 MMHG
ECHO LVOT PEAK VELOCITY: 1.1 M/S
ECHO LVOT STROKE VOLUME INDEX: 47.7 ML/M2
ECHO LVOT SV: 95.4 ML
ECHO LVOT VTI: 25.1 CM
ECHO MAIN PULMONARY ARTERY DIAMETER: 2.3 CM
ECHO MV A VELOCITY: 0.53 M/S
ECHO MV AREA VTI: 4.3 CM2
ECHO MV E DECELERATION TIME (DT): 285 MS
ECHO MV E VELOCITY: 1.01 M/S
ECHO MV E/A RATIO: 1.91
ECHO MV E/E' LATERAL: 11.61
ECHO MV E/E' RATIO (AVERAGED): 12.53
ECHO MV E/E' SEPTAL: 13.45
ECHO MV LVOT VTI INDEX: 0.88
ECHO MV MAX VELOCITY: 1.2 M/S
ECHO MV MEAN GRADIENT: 2 MMHG
ECHO MV MEAN VELOCITY: 0.6 M/S
ECHO MV PEAK GRADIENT: 6 MMHG
ECHO MV VTI: 22 CM
ECHO PV MAX VELOCITY: 0.9 M/S
ECHO PV PEAK GRADIENT: 3 MMHG
ECHO RA AREA 4C: 25.5 CM2
ECHO RA END SYSTOLIC VOLUME APICAL 4 CHAMBER INDEX BSA: 42 ML/M2
ECHO RA VOLUME: 84 ML
ECHO RIGHT VENTRICULAR SYSTOLIC PRESSURE (RVSP): 25 MMHG
ECHO RV BASAL DIMENSION: 4 CM
ECHO RV LONGITUDINAL DIMENSION: 7.7 CM
ECHO RV MID DIMENSION: 2.3 CM
ECHO RV TAPSE: 1.7 CM (ref 1.7–?)
ECHO TV REGURGITANT MAX VELOCITY: 2.36 M/S
ECHO TV REGURGITANT PEAK GRADIENT: 22 MMHG
EOSINOPHIL # BLD: 0.06 K/UL (ref 0–0.4)
EOSINOPHIL NFR BLD: 0.5 % (ref 0–5)
ERYTHROCYTE [DISTWIDTH] IN BLOOD BY AUTOMATED COUNT: 12.5 % (ref 11.6–14.5)
GLUCOSE SERPL-MCNC: 115 MG/DL (ref 74–108)
HCT VFR BLD AUTO: 38.5 % (ref 35–45)
HGB BLD-MCNC: 11.5 G/DL (ref 12–16)
IMM GRANULOCYTES # BLD AUTO: 0.04 K/UL (ref 0–0.04)
IMM GRANULOCYTES NFR BLD AUTO: 0.4 % (ref 0–0.5)
LYMPHOCYTES # BLD: 2.86 K/UL (ref 0.9–3.6)
LYMPHOCYTES NFR BLD: 26 % (ref 21–52)
MCH RBC QN AUTO: 26.7 PG (ref 24–34)
MCHC RBC AUTO-ENTMCNC: 29.9 G/DL (ref 31–37)
MCV RBC AUTO: 89.5 FL (ref 78–100)
MONOCYTES # BLD: 0.51 K/UL (ref 0.05–1.2)
MONOCYTES NFR BLD: 4.6 % (ref 3–10)
NEUTS SEG # BLD: 7.49 K/UL (ref 1.8–8)
NEUTS SEG NFR BLD: 68.2 % (ref 40–73)
NRBC # BLD: 0 K/UL (ref 0–0.01)
NRBC BLD-RTO: 0 PER 100 WBC
PLATELET # BLD AUTO: 246 K/UL (ref 135–420)
PMV BLD AUTO: 10.5 FL (ref 9.2–11.8)
POTASSIUM SERPL-SCNC: 4 MMOL/L (ref 3.5–5.5)
RBC # BLD AUTO: 4.3 M/UL (ref 4.2–5.3)
SODIUM SERPL-SCNC: 141 MMOL/L (ref 136–145)
TROPONIN T SERPL HS-MCNC: 57.7 NG/L (ref 0–14)
TROPONIN T SERPL HS-MCNC: 83.9 NG/L (ref 0–14)
TROPONIN T SERPL HS-MCNC: 97.2 NG/L (ref 0–14)
WBC # BLD AUTO: 11 K/UL (ref 4.6–13.2)

## 2025-06-26 PROCEDURE — 84484 ASSAY OF TROPONIN QUANT: CPT

## 2025-06-26 PROCEDURE — 96372 THER/PROPH/DIAG INJ SC/IM: CPT

## 2025-06-26 PROCEDURE — 96374 THER/PROPH/DIAG INJ IV PUSH: CPT

## 2025-06-26 PROCEDURE — 80048 BASIC METABOLIC PNL TOTAL CA: CPT

## 2025-06-26 PROCEDURE — G0378 HOSPITAL OBSERVATION PER HR: HCPCS

## 2025-06-26 PROCEDURE — 94761 N-INVAS EAR/PLS OXIMETRY MLT: CPT

## 2025-06-26 PROCEDURE — 6360000002 HC RX W HCPCS: Performed by: PHYSICIAN ASSISTANT

## 2025-06-26 PROCEDURE — 36415 COLL VENOUS BLD VENIPUNCTURE: CPT

## 2025-06-26 PROCEDURE — 84443 ASSAY THYROID STIM HORMONE: CPT

## 2025-06-26 PROCEDURE — 82465 ASSAY BLD/SERUM CHOLESTEROL: CPT

## 2025-06-26 PROCEDURE — 6370000000 HC RX 637 (ALT 250 FOR IP): Performed by: PHYSICIAN ASSISTANT

## 2025-06-26 PROCEDURE — 2500000003 HC RX 250 WO HCPCS: Performed by: PHYSICIAN ASSISTANT

## 2025-06-26 PROCEDURE — 85025 COMPLETE CBC W/AUTO DIFF WBC: CPT

## 2025-06-26 PROCEDURE — 93306 TTE W/DOPPLER COMPLETE: CPT

## 2025-06-26 RX ORDER — ASPIRIN 81 MG/1
81 TABLET ORAL 2 TIMES DAILY
Status: DISCONTINUED | OUTPATIENT
Start: 2025-06-26 | End: 2025-06-26 | Stop reason: HOSPADM

## 2025-06-26 RX ORDER — NITROGLYCERIN 0.4 MG/1
0.4 TABLET SUBLINGUAL PRN
Status: DISCONTINUED | OUTPATIENT
Start: 2025-06-26 | End: 2025-06-26 | Stop reason: HOSPADM

## 2025-06-26 RX ORDER — NITROGLYCERIN 0.4 MG/1
0.4 TABLET SUBLINGUAL PRN
Qty: 25 TABLET | Refills: 3 | Status: SHIPPED | OUTPATIENT
Start: 2025-06-26

## 2025-06-26 RX ADMIN — ENOXAPARIN SODIUM 40 MG: 100 INJECTION SUBCUTANEOUS at 08:30

## 2025-06-26 RX ADMIN — SODIUM CHLORIDE, PRESERVATIVE FREE 10 ML: 5 INJECTION INTRAVENOUS at 00:29

## 2025-06-26 RX ADMIN — HYDRALAZINE HYDROCHLORIDE 50 MG: 25 TABLET ORAL at 08:30

## 2025-06-26 RX ADMIN — SODIUM CHLORIDE, PRESERVATIVE FREE 10 ML: 5 INJECTION INTRAVENOUS at 08:39

## 2025-06-26 RX ADMIN — HYDRALAZINE HYDROCHLORIDE 50 MG: 25 TABLET ORAL at 00:20

## 2025-06-26 RX ADMIN — ASPIRIN 81 MG: 81 TABLET, COATED ORAL at 08:30

## 2025-06-26 RX ADMIN — ACETAMINOPHEN 650 MG: 325 TABLET ORAL at 00:19

## 2025-06-26 RX ADMIN — MONTELUKAST 10 MG: 10 TABLET, FILM COATED ORAL at 08:30

## 2025-06-26 RX ADMIN — ATORVASTATIN CALCIUM 40 MG: 40 TABLET, FILM COATED ORAL at 08:30

## 2025-06-26 RX ADMIN — Medication 3 MG: at 00:19

## 2025-06-26 RX ADMIN — HYDRALAZINE HYDROCHLORIDE 50 MG: 25 TABLET ORAL at 13:37

## 2025-06-26 RX ADMIN — FUROSEMIDE 20 MG: 10 INJECTION, SOLUTION INTRAMUSCULAR; INTRAVENOUS at 00:18

## 2025-06-26 RX ADMIN — CARVEDILOL 25 MG: 12.5 TABLET, FILM COATED ORAL at 16:28

## 2025-06-26 RX ADMIN — CARVEDILOL 25 MG: 12.5 TABLET, FILM COATED ORAL at 08:30

## 2025-06-26 ASSESSMENT — PAIN DESCRIPTION - DESCRIPTORS: DESCRIPTORS: ACHING

## 2025-06-26 ASSESSMENT — PAIN DESCRIPTION - LOCATION: LOCATION: HEAD

## 2025-06-26 ASSESSMENT — PAIN SCALES - GENERAL: PAINLEVEL_OUTOF10: 5

## 2025-06-26 NOTE — PROGRESS NOTES
1948 patient received in signout from Dr. Ornelas, awaiting repeat troponin and CMP.  Repeat EKG showing normal sinus rhythm with a sinus arrhythmia, possible voltage criteria for LVH without evidence of STEMI.  Chest x-ray without acute findings.  Patient likely in admission due to multiple cardiac risk factors    2059 repeat troponin elevated, spoke with PM hospitalist who accepts the patient for chest pain admission

## 2025-06-26 NOTE — DISCHARGE SUMMARY
Discharge Summary       PATIENT ID: Bong Del Cid  MRN: [id number]   YOB: 1960    DATE OF ADMISSION: 6/25/2025  5:54 PM    DATE OF DISCHARGE: 06/26/25    PRIMARY CARE PROVIDER: Zina Rene APRN - CNP     ATTENDING PHYSICIAN: Rolando Glover MD  DISCHARGING PROVIDER: Rolando Glover MD        CONSULTATIONS: IP CONSULT TO SPIRITUAL SERVICES  IP CONSULT TO CARDIOLOGY  IP CONSULT TO SPIRITUAL SERVICES    PROCEDURES/SURGERIES: * No surgery found *    ADMITTING DIAGNOSES & HOSPITAL COURSE:   Bong Del Cid is a 64 y.o.   female who presented to the emergency department at [facility] on 6/25/2025 around 1730.  According the patient she was doing well until today.  She states that she was out walking around and went to local store.  She states that is very hot outside.  She states that she started to develop some chest pressure and heaviness.  With the pressure and heaviness she developed some general sweatiness and shortness of breath.  She states that she just felt very uncomfortable at her chest area.  She states that because of that she went home to get out of the heat and try and get more comfortable.  She states that she took 2 aspirin 81 mg.  She states that she laid down on try to relax.  She states that unfortunately it did not seem to help.  She states that she also took her Coreg.  She had not taken her Coreg yet because she was going out and apparently Coreg causes her to void more.  She states that she waited about an hour or so to see if the aspirin and the Coreg would help.  Unfortunately the symptoms did not really resolve.  Because of this she did call EMS.  EMS brought her into the emergency department.  Patient reports that she has been in her normal state of health until today.  She has been doing pretty good.  She has not really had any concerns or problems.  She has not had any medication changes.  She has not had any recent chest pain.  She does have

## 2025-06-26 NOTE — PROGRESS NOTES
1600 - Patient accepted all medications and meals without complaint. Informed of discharge order.      1800 - AVS reviewed with patent , family present, questions answered.  Paatient instructed to schedule a post-hospitalization follow- up visit with her PCP, Zina Rene within 1 week and to schedule a cardiology follow-up appointment within 2 weeks. Patient given written educational material regarding chest pain, mitral valve regurgitation, and transesophageal echocardiogram as mentioned in discharge summary.  Left antecubital PIV removed without complication.  Dressing applied.  Patient removed portable telemetry box and dressed for discharge.    1815 - Patient was transported from unit to vehicle via wheelchair for discharge.

## 2025-06-26 NOTE — CONSULTS
CARDIOLOGY CONSULTATION    REASON FOR CONSULT: Chest pressure     REQUESTING PROVIDER: Franklin Petit PA-C     CHIEF COMPLAINT:  Chest pressure     HISTORY OF PRESENT ILLNESS:  Bong Del Cid is a 64 y.o. year-old female with past medical history significant for HFmrEF, NICM, mitral valve regurgitation, essential hypertension and dyslipidemia who was evaluated today due to chest pressure and shortness of breath. Patient presents to the ER after experiencing chest pressure and shortness that was unrelieved with ASA and carvedilol. She states she was out in the heat shopping when symptoms presented. Attempted to rest at home with no resolution of symptoms. She watches her fluid intake due to CHF history and takes her diuretics daily as prescribed.  Reports chronic LOPEZ for many years that generally recovers with rest. At bedside states chest pressure and SOB at rest has now resolved. No peripheral edema or abdominal distention. Denies any palpitations or dizziness. In the ED crt. slightly elevated 1.38. Troponin T 97.2->57.7. pBNP 3426. EKG with no overt ischemia. CXR with no acute cardiopulmonary process.     Records from hospital admission course thus far reviewed.      Telemetry reviewed.  No events overnight. SR on telemetry.     INPATIENT MEDICATIONS:  Home medications reviewed.    Current Facility-Administered Medications:     nitroGLYCERIN (NITROSTAT) SL tablet 0.4 mg, 0.4 mg, SubLINGual, PRN, Franklin Petit PA-C    aspirin EC tablet 81 mg, 81 mg, Oral, BID, Franklin Petit PA-C, 81 mg at 06/26/25 0830    atorvastatin (LIPITOR) tablet 40 mg, 40 mg, Oral, Daily, Franklin Petit PA-C, 40 mg at 06/26/25 0830    azelastine (ASTELIN) 0.1 % nasal spray 2 spray (Patient Supplied), 2 spray, Each Nostril, Daily, Franklin Petit PA-C    carvedilol (COREG) tablet 25 mg, 25 mg, Oral, BID WC, Franklin Petit PA-C, 25 mg at 06/26/25 0830    fluticasone (FLONASE) 50 MCG/ACT nasal spray 2 spray, 2 spray, Nasal,

## 2025-06-26 NOTE — ACP (ADVANCE CARE PLANNING)
Advance Care Planning     Advance Care Planning Inpatient Note  [facility] Department    Today's Date: 6/26/2025  Unit: [facility]    Received request from patient.  Upon review of chart and communication with care team, patient's decision making abilities are not in question.. Patient and Child/Children was/were present in the room during visit.    Goals of ACP Conversation:  Discuss advance care planning documents  Facilitate a discussion related to patient's goals of care as they align with the patient's values and beliefs.    Health Care Decision Makers:       Primary Decision Maker: NehemiasVicki  Child - [phone]    Secondary Decision Maker: Juan Del Cid - Child - [phone]  Summary:  Verified Documents  Completed New Documents  Verified Healthcare Decision Maker  Updated Healthcare Decision Maker  Documented Next of Kin, per patient report    Advance Care Planning Documents (Patient Wishes):  Healthcare Power of /Advance Directive Appointment of Health Care Agent     Assessment:  Patient was alert and oriented lying in bed. Daughter present and grandson.  review forms with family and they verbalized understanding. No concerns voiced.In the assessment narrative, address the way holistic dimensions influence ACP decisions - medical, psychological, psychosocial, family systems, ethical, cultural, societal and spiritual:[id number]}    Interventions:  Requested patient/family to submit existing document for our records: Healthcare Power of /Advance Directive Appointment of Health Care Agent  Provided education on documents for clarity and greater understanding  Discussed and provided education on state decision maker hierarchy  Assisted in the completion of documents according to patient's wishes at this time  Encouraged ongoing ACP conversation with future decision makers and loved ones    Care Preferences Communicated:   No    Outcomes/Plan:  ACP Discussion: Completed  New

## 2025-06-26 NOTE — PLAN OF CARE
Problem: Pain  Goal: Verbalizes/displays adequate comfort level or baseline comfort level  6/26/2025 0755 by Dorita Amaya RN  Outcome: Progressing  6/26/2025 0101 by Rachel Craven LPN  Outcome: Progressing     Problem: Chronic Conditions and Co-morbidities  Goal: Patient's chronic conditions and co-morbidity symptoms are monitored and maintained or improved  6/26/2025 0755 by Dorita Amaya RN  Outcome: Progressing  6/26/2025 0101 by Rachel Craven LPN  Outcome: Progressing     Problem: Discharge Planning  Goal: Discharge to home or other facility with appropriate resources  6/26/2025 0755 by Dorita Amaya RN  Outcome: Progressing  6/26/2025 0101 by Rachel Craven LPN  Outcome: Progressing     Problem: ABCDS Injury Assessment  Goal: Absence of physical injury  6/26/2025 0755 by Dorita Amaya RN  Outcome: Progressing  6/26/2025 0101 by Rachel Craven LPN  Outcome: Progressing

## 2025-06-26 NOTE — PROGRESS NOTES
2237 - patient arrived to unit, rm 246, after report was called via wheelchair.  Patient assisted to restroom and back into bed.  Vitals obtained.  Assessment completed.  Patient sitting on side of bed eating, family at bedside.  Ice water given.  No concerns at this time. CBWR.      0020 - patient resting in bed.  Medications given per MAR.  PRN tylenol given for headache.  VS obtained and assessed.  CBWR.  No concerns at this time.     0430 - vital signs obtain and assessed.  Patient resting in bed.  No concerns at this time.  CBWR.

## 2025-06-26 NOTE — H&P
History and Physical    Subjective:     Bong Del Cid is a 64 y.o.   female who presented to the emergency department at [facility] on 6/25/2025 around 1730.  According the patient she was doing well until today.  She states that she was out walking around and went to local store.  She states that is very hot outside.  She states that she started to develop some chest pressure and heaviness.  With the pressure and heaviness she developed some general sweatiness and shortness of breath.  She states that she just felt very uncomfortable at her chest area.  She states that because of that she went home to get out of the heat and try and get more comfortable.  She states that she took 2 aspirin 81 mg.  She states that she laid down on try to relax.  She states that unfortunately it did not seem to help.  She states that she also took her Coreg.  She had not taken her Coreg yet because she was going out and apparently Coreg causes her to void more.  She states that she waited about an hour or so to see if the aspirin and the Coreg would help.  Unfortunately the symptoms did not really resolve.  Because of this she did call EMS.  EMS brought her into the emergency department.  Patient reports that she has been in her normal state of health until today.  She has been doing pretty good.  She has not really had any concerns or problems.  She has not had any medication changes.  She has not had any recent chest pain.  She does have periodic shortness of breath with exertion.  She states that her PCP told her that she has seasonal allergies.  She is not sure if any ones are mentioned asthma or not.  She has never had a pulmonary function test.  She has not had any coughing or congestion.  She has not had any pleuritic chest pain.  She denies any unusual headache or lightheadedness.  No dizziness.  She states that she did get a little bit fatigued today but otherwise no problems.  She does

## 2025-06-26 NOTE — PROGRESS NOTES
Spiritual Health History and Assessment/Progress Note  [facility]    (P) Initial Encounter, Advance Care Planning,  ,  ,      Name: Bong Del Cid MRN: [id number]    Age: 64 y.o.     Sex: female   Language: English   Hindu: Other   Chest pain     Date: 6/26/2025            Total Time Calculated: (P) 45 min              Spiritual Assessment began in [address]        Referral/Consult From: (P) Nursing Supervisor/Manager   Encounter Overview/Reason: (P) Initial Encounter, Advance Care Planning  Service Provided For: (P) Patient, Family    Ora, Belief, Meaning:   Patient identifies as spiritual, is connected with a ora tradition or spiritual practice, and has beliefs or practices that help with coping during difficult times  Family/Friends identify as spiritual, are connected with a ora tradition or spiritual practice, and have beliefs or practices that help with coping during difficult times      Importance and Influence:  Patient has spiritual/personal beliefs that influence decisions regarding their health  Family/Friends have spiritual/personal beliefs that influence decisions regarding the patient's health    Community:  Patient is connected with a spiritual community  Family/Friends are connected with a spiritual community:    Assessment and Plan of Care:     Patient Interventions include: Facilitated expression of thoughts and feelings, Explored spiritual coping/struggle/distress, Engaged in theological reflection, Affirmed coping skills/support systems, Provided sacramental/Yarsani ritual, Facilitated life review and/ or legacy, and Assisted in Advance Care Planning conversation  Family/Friends Interventions include: Assisted in Advance Care Planning conversation    Patient Plan of Care: Spiritual Care available upon further referral  Family/Friends Plan of Care: Spiritual Care available upon further referral    Electronically signed by Chaplain Bhargav on 6/26/2025 at 2:13

## 2025-06-27 ENCOUNTER — TELEPHONE (OUTPATIENT)
Dept: FAMILY MEDICINE CLINIC | Facility: CLINIC | Age: 65
End: 2025-06-27

## 2025-06-27 LAB
EKG ATRIAL RATE: 86 BPM
EKG ATRIAL RATE: 93 BPM
EKG DIAGNOSIS: NORMAL
EKG DIAGNOSIS: NORMAL
EKG P AXIS: 56 DEGREES
EKG P AXIS: 65 DEGREES
EKG P-R INTERVAL: 164 MS
EKG P-R INTERVAL: 186 MS
EKG Q-T INTERVAL: 398 MS
EKG Q-T INTERVAL: 410 MS
EKG QRS DURATION: 86 MS
EKG QRS DURATION: 92 MS
EKG QTC CALCULATION (BAZETT): 490 MS
EKG QTC CALCULATION (BAZETT): 494 MS
EKG R AXIS: -21 DEGREES
EKG R AXIS: -24 DEGREES
EKG T AXIS: -6 DEGREES
EKG T AXIS: -82 DEGREES
EKG VENTRICULAR RATE: 86 BPM
EKG VENTRICULAR RATE: 93 BPM
TSH SERPL DL<=0.05 MIU/L-ACNC: 0.62 UIU/ML (ref 0.45–4.5)

## 2025-06-27 NOTE — TELEPHONE ENCOUNTER
Care Transitions Initial Follow Up Call    Outreach made within 2 business days of discharge: Yes    Patient: Bong Del Cid Patient : 1960   MRN: [id number]  Reason for Admission: chest pain  Discharge Date: 25       Spoke with: patient    Discharge department/facility: [facility]    TCM Interactive Patient Contact:  Was patient able to fill all prescriptions: Yes  Was patient instructed to bring all medications to the follow-up visit: Yes  Is patient taking all medications as directed in the discharge summary? Yes  Does patient understand their discharge instructions: Yes  Does patient have questions or concerns that need addressed prior to 7-14 day follow up office visit: no    Additional needs identified to be addressed with provider  No needs identified             Scheduled appointment with PCP within 7-14 days    Follow Up  Future Appointments   Date Time Provider Department Center   2025 11:00 AM Zina Rene APRN - CNP [facility]       Mary Cisneros LPN

## 2025-07-01 LAB — TSH SERPL DL<=0.05 MIU/L-ACNC: NORMAL UIU/ML

## 2025-07-23 RX ORDER — MONTELUKAST SODIUM 10 MG/1
10 TABLET ORAL DAILY
Qty: 30 TABLET | Refills: 0 | Status: SHIPPED | OUTPATIENT
Start: 2025-07-23

## 2025-07-30 ENCOUNTER — HOSPITAL ENCOUNTER (OUTPATIENT)
Age: 65
Setting detail: SPECIMEN
Discharge: HOME | End: 2025-08-02
Payer: MEDICARE

## 2025-07-30 ENCOUNTER — HOSPITAL ENCOUNTER (OUTPATIENT)
Age: 65
Discharge: HOME | End: 2025-08-02
Payer: MEDICARE

## 2025-07-30 ENCOUNTER — TRANSCRIBE ORDERS (OUTPATIENT)
Age: 65
End: 2025-07-30

## 2025-07-30 VITALS — BODY MASS INDEX: 32.14 KG/M2 | WEIGHT: 200 LBS | HEIGHT: 66 IN

## 2025-07-30 DIAGNOSIS — E78.5 DYSLIPIDEMIA: ICD-10-CM

## 2025-07-30 DIAGNOSIS — E53.8 COBALAMIN DEFICIENCY: ICD-10-CM

## 2025-07-30 DIAGNOSIS — I42.0 NONISCHEMIC CONGESTIVE CARDIOMYOPATHY (HCC): Primary | ICD-10-CM

## 2025-07-30 DIAGNOSIS — E55.9 VITAMIN D DEFICIENCY: ICD-10-CM

## 2025-07-30 DIAGNOSIS — Z12.31 VISIT FOR SCREENING MAMMOGRAM: ICD-10-CM

## 2025-07-30 DIAGNOSIS — I10 ESSENTIAL (PRIMARY) HYPERTENSION: Primary | ICD-10-CM

## 2025-07-30 DIAGNOSIS — I10 ESSENTIAL (PRIMARY) HYPERTENSION: ICD-10-CM

## 2025-07-30 DIAGNOSIS — I42.0 NONISCHEMIC CONGESTIVE CARDIOMYOPATHY (HCC): ICD-10-CM

## 2025-07-30 LAB
ALBUMIN SERPL-MCNC: 3.9 G/DL (ref 3.4–5)
ALBUMIN/GLOB SERPL: 0.9
ALP SERPL-CCNC: 82 U/L (ref 45–117)
ALT SERPL-CCNC: 11 U/L (ref 10–35)
ANION GAP SERPL CALC-SCNC: 13 MMOL/L
ANION GAP SERPL CALC-SCNC: 13 MMOL/L
AST SERPL W P-5'-P-CCNC: 20 U/L (ref 10–38)
BILIRUB SERPL-MCNC: 0.5 MG/DL (ref 0.2–1)
BUN SERPL-MCNC: 21 MG/DL (ref 6–23)
BUN SERPL-MCNC: 21 MG/DL (ref 6–23)
BUN/CREAT SERPL: 23
BUN/CREAT SERPL: 24
CA-I BLD-MCNC: 9.7 MG/DL (ref 8.5–10.1)
CA-I BLD-MCNC: 9.7 MG/DL (ref 8.5–10.1)
CHLORIDE SERPL-SCNC: 104 MMOL/L (ref 98–107)
CHLORIDE SERPL-SCNC: 105 MMOL/L (ref 98–107)
CO2 SERPL-SCNC: 22 MMOL/L (ref 21–32)
CO2 SERPL-SCNC: 22 MMOL/L (ref 21–32)
CREAT SERPL-MCNC: 0.9 MG/DL (ref 0.6–1.3)
CREAT SERPL-MCNC: 0.92 MG/DL (ref 0.6–1.3)
ERYTHROCYTE [DISTWIDTH] IN BLOOD BY AUTOMATED COUNT: 11.9 % (ref 11.6–14.5)
GLOBULIN SER CALC-MCNC: 4.3 G/DL
GLUCOSE SERPL-MCNC: 94 MG/DL (ref 74–108)
GLUCOSE SERPL-MCNC: 94 MG/DL (ref 74–108)
HCT VFR BLD AUTO: 39.6 % (ref 35–45)
HGB BLD-MCNC: 11.8 G/DL (ref 12–16)
MCH RBC QN AUTO: 27.1 PG (ref 24–34)
MCHC RBC AUTO-ENTMCNC: 29.8 G/DL (ref 31–37)
MCV RBC AUTO: 90.8 FL (ref 78–100)
NRBC # BLD: 0 K/UL (ref 0–0.01)
NRBC BLD-RTO: 0 PER 100 WBC
PLATELET # BLD AUTO: 284 K/UL (ref 135–420)
PMV BLD AUTO: 11.1 FL (ref 9.2–11.8)
POTASSIUM SERPL-SCNC: 3.8 MMOL/L (ref 3.5–5.5)
POTASSIUM SERPL-SCNC: 3.8 MMOL/L (ref 3.5–5.5)
PROT SERPL-MCNC: 8.2 G/DL (ref 6.4–8.2)
RBC # BLD AUTO: 4.36 M/UL (ref 4.2–5.3)
SODIUM SERPL-SCNC: 140 MMOL/L (ref 136–145)
SODIUM SERPL-SCNC: 140 MMOL/L (ref 136–145)
TSH, 3RD GENERATION: 0.73 UIU/ML (ref 0.27–4.2)
WBC # BLD AUTO: 8.2 K/UL (ref 4.6–13.2)

## 2025-07-30 PROCEDURE — 84443 ASSAY THYROID STIM HORMONE: CPT

## 2025-07-30 PROCEDURE — 36415 COLL VENOUS BLD VENIPUNCTURE: CPT

## 2025-07-30 PROCEDURE — 80048 BASIC METABOLIC PNL TOTAL CA: CPT

## 2025-07-30 PROCEDURE — 85027 COMPLETE CBC AUTOMATED: CPT

## 2025-07-30 PROCEDURE — 80061 LIPID PANEL: CPT

## 2025-07-30 PROCEDURE — 77063 BREAST TOMOSYNTHESIS BI: CPT

## 2025-07-30 PROCEDURE — 82607 VITAMIN B-12: CPT

## 2025-07-30 PROCEDURE — 82306 VITAMIN D 25 HYDROXY: CPT

## 2025-07-30 PROCEDURE — 80053 COMPREHEN METABOLIC PANEL: CPT

## 2025-07-31 LAB
25(OH)D3 SERPL-MCNC: 23.7 NG/ML (ref 30–100)
CHOLEST SERPL-MCNC: 141 MG/DL
HDLC SERPL-MCNC: 52 MG/DL (ref 40–60)
HDLC SERPL: 2.7 (ref 0–5)
LDLC SERPL CALC-MCNC: 75 MG/DL (ref 0–100)
TRIGL SERPL-MCNC: 71 MG/DL (ref 0–150)
VIT B12 SERPL-MCNC: 641 PG/ML (ref 211–911)
VLDLC SERPL CALC-MCNC: 14 MG/DL

## (undated) DEVICE — COPE MANDRIL WIRE GUIDE STAINLESS STEEL: Brand: COPE

## (undated) DEVICE — 3M™ TEGADERM™ TRANSPARENT FILM DRESSING FRAME STYLE, 1626W, 4 IN X 4-3/4 IN (10 CM X 12 CM), 50/CT 4CT/CASE: Brand: 3M™ TEGADERM™

## (undated) DEVICE — CATHETER COR DIAG JUDKINS L 5.0 CRV 5FR 100CM 0 SIDE H

## (undated) DEVICE — GLIDESHEATH SLENDER ACCESS KIT: Brand: GLIDESHEATH SLENDER

## (undated) DEVICE — CATHETER ANGIO 5FR L100CM GRY S STL NYL JR4 3 SEG BRAID L

## (undated) DEVICE — CATH 5F 100CM JL35 -- DXTERITY

## (undated) DEVICE — BAND RADIAL COMPR ARTERY 24CM -- REG BX/10

## (undated) DEVICE — WASTEBAG DRIP/ADAPTER: Brand: MEDLINE INDUSTRIES, INC.

## (undated) DEVICE — GUIDEWIRE VASC L260CM DIA0.035IN TIP L3MM STD EXCHG PTFE J

## (undated) DEVICE — SURGICAL PROCEDURE TRAY CRD CATH 3 PRT

## (undated) DEVICE — PERCUTANEOUS ENTRY THINWALL NEEDLE  ONE-PART: Brand: COOK

## (undated) DEVICE — 3M™ STERI-DRAPE™ SMALL DRAPE WITH ADHESIVE APERTURE 1092 25/BX,4/CS&#X20;: Brand: STERI-DRAPE™

## (undated) DEVICE — SYRINGE MED 10ML RED POLYCARB BRL FIX M LUER CONN FLAT GRP